# Patient Record
Sex: MALE | Race: WHITE | NOT HISPANIC OR LATINO
[De-identification: names, ages, dates, MRNs, and addresses within clinical notes are randomized per-mention and may not be internally consistent; named-entity substitution may affect disease eponyms.]

---

## 2018-02-02 PROBLEM — Z00.00 ENCOUNTER FOR PREVENTIVE HEALTH EXAMINATION: Status: ACTIVE | Noted: 2018-02-02

## 2018-03-06 ENCOUNTER — APPOINTMENT (OUTPATIENT)
Dept: ORTHOPEDIC SURGERY | Facility: CLINIC | Age: 83
End: 2018-03-06
Payer: MEDICAID

## 2018-03-06 VITALS — HEIGHT: 63 IN | SYSTOLIC BLOOD PRESSURE: 161 MMHG | HEART RATE: 107 BPM | DIASTOLIC BLOOD PRESSURE: 85 MMHG

## 2018-03-06 PROCEDURE — 99204 OFFICE O/P NEW MOD 45 MIN: CPT

## 2018-03-06 PROCEDURE — 72100 X-RAY EXAM L-S SPINE 2/3 VWS: CPT

## 2018-03-09 ENCOUNTER — FORM ENCOUNTER (OUTPATIENT)
Age: 83
End: 2018-03-09

## 2018-03-10 ENCOUNTER — OUTPATIENT (OUTPATIENT)
Dept: OUTPATIENT SERVICES | Facility: HOSPITAL | Age: 83
LOS: 1 days | End: 2018-03-10
Payer: MEDICAID

## 2018-03-10 ENCOUNTER — APPOINTMENT (OUTPATIENT)
Dept: MRI IMAGING | Facility: CLINIC | Age: 83
End: 2018-03-10
Payer: MEDICAID

## 2018-03-10 DIAGNOSIS — G95.9 DISEASE OF SPINAL CORD, UNSPECIFIED: ICD-10-CM

## 2018-03-10 DIAGNOSIS — M47.816 SPONDYLOSIS WITHOUT MYELOPATHY OR RADICULOPATHY, LUMBAR REGION: ICD-10-CM

## 2018-03-10 PROCEDURE — 72141 MRI NECK SPINE W/O DYE: CPT | Mod: 26

## 2018-03-10 PROCEDURE — 72148 MRI LUMBAR SPINE W/O DYE: CPT | Mod: 26

## 2018-03-10 PROCEDURE — 72141 MRI NECK SPINE W/O DYE: CPT

## 2018-03-10 PROCEDURE — 72148 MRI LUMBAR SPINE W/O DYE: CPT

## 2018-04-11 ENCOUNTER — RECORD ABSTRACTING (OUTPATIENT)
Age: 83
End: 2018-04-11

## 2018-04-13 ENCOUNTER — APPOINTMENT (OUTPATIENT)
Dept: ORTHOPEDIC SURGERY | Facility: CLINIC | Age: 83
End: 2018-04-13
Payer: MEDICAID

## 2018-04-13 VITALS
SYSTOLIC BLOOD PRESSURE: 166 MMHG | HEIGHT: 63 IN | HEART RATE: 107 BPM | DIASTOLIC BLOOD PRESSURE: 85 MMHG | WEIGHT: 155 LBS | BODY MASS INDEX: 27.46 KG/M2

## 2018-04-13 DIAGNOSIS — Z86.69 PERSONAL HISTORY OF OTHER DISEASES OF THE NERVOUS SYSTEM AND SENSE ORGANS: ICD-10-CM

## 2018-04-13 DIAGNOSIS — Z87.39 PERSONAL HISTORY OF OTHER DISEASES OF THE MUSCULOSKELETAL SYSTEM AND CONNECTIVE TISSUE: ICD-10-CM

## 2018-04-13 DIAGNOSIS — Z86.39 PERSONAL HISTORY OF OTHER ENDOCRINE, NUTRITIONAL AND METABOLIC DISEASE: ICD-10-CM

## 2018-04-13 PROCEDURE — 72040 X-RAY EXAM NECK SPINE 2-3 VW: CPT

## 2018-04-13 PROCEDURE — 99213 OFFICE O/P EST LOW 20 MIN: CPT

## 2018-04-13 PROCEDURE — 99215 OFFICE O/P EST HI 40 MIN: CPT

## 2018-06-13 ENCOUNTER — OUTPATIENT (OUTPATIENT)
Dept: OUTPATIENT SERVICES | Facility: HOSPITAL | Age: 83
LOS: 1 days | End: 2018-06-13
Payer: MEDICAID

## 2018-06-13 DIAGNOSIS — M54.2 CERVICALGIA: ICD-10-CM

## 2018-06-13 DIAGNOSIS — Z51.89 ENCOUNTER FOR OTHER SPECIFIED AFTERCARE: ICD-10-CM

## 2018-06-13 DIAGNOSIS — M54.5 LOW BACK PAIN: ICD-10-CM

## 2018-06-15 ENCOUNTER — APPOINTMENT (OUTPATIENT)
Dept: NEUROLOGY | Facility: CLINIC | Age: 83
End: 2018-06-15
Payer: MEDICAID

## 2018-06-15 VITALS
WEIGHT: 155 LBS | BODY MASS INDEX: 27.46 KG/M2 | HEIGHT: 63 IN | DIASTOLIC BLOOD PRESSURE: 65 MMHG | SYSTOLIC BLOOD PRESSURE: 150 MMHG

## 2018-06-15 PROCEDURE — 99204 OFFICE O/P NEW MOD 45 MIN: CPT

## 2018-06-27 ENCOUNTER — APPOINTMENT (OUTPATIENT)
Dept: NEUROLOGY | Facility: CLINIC | Age: 83
End: 2018-06-27
Payer: MEDICAID

## 2018-06-27 PROCEDURE — 93880 EXTRACRANIAL BILAT STUDY: CPT

## 2018-07-20 ENCOUNTER — APPOINTMENT (OUTPATIENT)
Dept: UROLOGY | Facility: CLINIC | Age: 83
End: 2018-07-20
Payer: MEDICAID

## 2018-07-20 VITALS
HEART RATE: 96 BPM | WEIGHT: 155 LBS | SYSTOLIC BLOOD PRESSURE: 152 MMHG | TEMPERATURE: 98.6 F | BODY MASS INDEX: 27.46 KG/M2 | DIASTOLIC BLOOD PRESSURE: 67 MMHG | HEIGHT: 63 IN

## 2018-07-20 DIAGNOSIS — Z78.9 OTHER SPECIFIED HEALTH STATUS: ICD-10-CM

## 2018-07-20 DIAGNOSIS — Z82.61 FAMILY HISTORY OF ARTHRITIS: ICD-10-CM

## 2018-07-20 PROCEDURE — 99204 OFFICE O/P NEW MOD 45 MIN: CPT

## 2018-08-03 ENCOUNTER — TRANSCRIPTION ENCOUNTER (OUTPATIENT)
Age: 83
End: 2018-08-03

## 2018-08-07 ENCOUNTER — MESSAGE (OUTPATIENT)
Age: 83
End: 2018-08-07

## 2018-08-08 PROCEDURE — 97110 THERAPEUTIC EXERCISES: CPT

## 2018-08-08 PROCEDURE — 97162 PT EVAL MOD COMPLEX 30 MIN: CPT

## 2018-08-08 PROCEDURE — 97140 MANUAL THERAPY 1/> REGIONS: CPT

## 2018-08-08 PROCEDURE — 97010 HOT OR COLD PACKS THERAPY: CPT

## 2018-08-10 ENCOUNTER — APPOINTMENT (OUTPATIENT)
Dept: UROLOGY | Facility: CLINIC | Age: 83
End: 2018-08-10
Payer: MEDICAID

## 2018-08-10 ENCOUNTER — LABORATORY RESULT (OUTPATIENT)
Age: 83
End: 2018-08-10

## 2018-08-10 VITALS
TEMPERATURE: 98.6 F | SYSTOLIC BLOOD PRESSURE: 145 MMHG | WEIGHT: 155 LBS | DIASTOLIC BLOOD PRESSURE: 68 MMHG | HEIGHT: 63 IN | BODY MASS INDEX: 27.46 KG/M2 | HEART RATE: 87 BPM

## 2018-08-10 PROCEDURE — 76942 ECHO GUIDE FOR BIOPSY: CPT | Mod: 59

## 2018-08-10 PROCEDURE — 55700: CPT

## 2018-08-10 PROCEDURE — 76872 US TRANSRECTAL: CPT

## 2018-08-10 RX ORDER — CIPROFLOXACIN HYDROCHLORIDE 500 MG/1
500 TABLET, FILM COATED ORAL
Qty: 2 | Refills: 0 | Status: DISCONTINUED | COMMUNITY
Start: 2018-07-26 | End: 2018-08-10

## 2018-08-14 ENCOUNTER — MESSAGE (OUTPATIENT)
Age: 83
End: 2018-08-14

## 2018-08-17 ENCOUNTER — APPOINTMENT (OUTPATIENT)
Dept: UROLOGY | Facility: CLINIC | Age: 83
End: 2018-08-17
Payer: MEDICAID

## 2018-08-17 VITALS
WEIGHT: 155 LBS | HEART RATE: 84 BPM | SYSTOLIC BLOOD PRESSURE: 121 MMHG | BODY MASS INDEX: 27.46 KG/M2 | DIASTOLIC BLOOD PRESSURE: 61 MMHG | TEMPERATURE: 98.6 F | HEIGHT: 63 IN

## 2018-08-17 PROCEDURE — 99214 OFFICE O/P EST MOD 30 MIN: CPT

## 2018-08-29 ENCOUNTER — FORM ENCOUNTER (OUTPATIENT)
Age: 83
End: 2018-08-29

## 2018-08-30 ENCOUNTER — APPOINTMENT (OUTPATIENT)
Dept: CT IMAGING | Facility: IMAGING CENTER | Age: 83
End: 2018-08-30
Payer: MEDICAID

## 2018-08-30 ENCOUNTER — OUTPATIENT (OUTPATIENT)
Dept: OUTPATIENT SERVICES | Facility: HOSPITAL | Age: 83
LOS: 1 days | End: 2018-08-30
Payer: MEDICAID

## 2018-08-30 ENCOUNTER — APPOINTMENT (OUTPATIENT)
Dept: NUCLEAR MEDICINE | Facility: IMAGING CENTER | Age: 83
End: 2018-08-30
Payer: MEDICAID

## 2018-08-30 DIAGNOSIS — R97.20 ELEVATED PROSTATE SPECIFIC ANTIGEN [PSA]: ICD-10-CM

## 2018-08-30 PROCEDURE — 78999 UNLISTED MISC PX DX NUC MED: CPT

## 2018-08-30 PROCEDURE — 74177 CT ABD & PELVIS W/CONTRAST: CPT

## 2018-08-30 PROCEDURE — 74177 CT ABD & PELVIS W/CONTRAST: CPT | Mod: 26

## 2018-08-30 PROCEDURE — 78306 BONE IMAGING WHOLE BODY: CPT | Mod: 26

## 2018-08-30 PROCEDURE — 78320: CPT | Mod: 26

## 2018-08-30 PROCEDURE — A9561: CPT

## 2018-08-30 PROCEDURE — 78306 BONE IMAGING WHOLE BODY: CPT

## 2018-08-30 PROCEDURE — 82565 ASSAY OF CREATININE: CPT

## 2018-08-31 ENCOUNTER — APPOINTMENT (OUTPATIENT)
Dept: UROLOGY | Facility: CLINIC | Age: 83
End: 2018-08-31
Payer: MEDICAID

## 2018-08-31 VITALS
BODY MASS INDEX: 27.46 KG/M2 | TEMPERATURE: 97.9 F | WEIGHT: 155 LBS | DIASTOLIC BLOOD PRESSURE: 68 MMHG | HEART RATE: 80 BPM | SYSTOLIC BLOOD PRESSURE: 138 MMHG | HEIGHT: 63 IN

## 2018-08-31 PROCEDURE — 96402 CHEMO HORMON ANTINEOPL SQ/IM: CPT

## 2018-08-31 PROCEDURE — 99214 OFFICE O/P EST MOD 30 MIN: CPT | Mod: 25

## 2018-09-07 ENCOUNTER — OTHER (OUTPATIENT)
Age: 83
End: 2018-09-07

## 2018-11-13 ENCOUNTER — MESSAGE (OUTPATIENT)
Age: 83
End: 2018-11-13

## 2018-11-16 ENCOUNTER — APPOINTMENT (OUTPATIENT)
Dept: UROLOGY | Facility: CLINIC | Age: 83
End: 2018-11-16
Payer: MEDICAID

## 2018-11-16 VITALS
HEIGHT: 63 IN | BODY MASS INDEX: 27.46 KG/M2 | SYSTOLIC BLOOD PRESSURE: 144 MMHG | DIASTOLIC BLOOD PRESSURE: 83 MMHG | HEART RATE: 83 BPM | WEIGHT: 155 LBS

## 2018-11-16 PROCEDURE — 99214 OFFICE O/P EST MOD 30 MIN: CPT

## 2018-11-16 RX ORDER — ATORVASTATIN CALCIUM 20 MG/1
20 TABLET, FILM COATED ORAL
Refills: 0 | Status: DISCONTINUED | COMMUNITY
End: 2018-11-16

## 2019-01-04 ENCOUNTER — APPOINTMENT (OUTPATIENT)
Dept: UROLOGY | Facility: CLINIC | Age: 84
End: 2019-01-04

## 2019-01-11 ENCOUNTER — APPOINTMENT (OUTPATIENT)
Dept: UROLOGY | Facility: CLINIC | Age: 84
End: 2019-01-11
Payer: MEDICAID

## 2019-01-11 VITALS — RESPIRATION RATE: 16 BRPM | HEART RATE: 80 BPM | DIASTOLIC BLOOD PRESSURE: 88 MMHG | SYSTOLIC BLOOD PRESSURE: 154 MMHG

## 2019-01-11 PROCEDURE — 96402 CHEMO HORMON ANTINEOPL SQ/IM: CPT

## 2019-01-11 PROCEDURE — 99214 OFFICE O/P EST MOD 30 MIN: CPT | Mod: 25

## 2019-01-11 RX ORDER — LEUPROLIDE ACETATE 30 MG
30 KIT INTRAMUSCULAR
Qty: 1 | Refills: 0 | Status: COMPLETED | OUTPATIENT
Start: 2019-01-11

## 2019-01-11 RX ADMIN — LEUPROLIDE ACETATE 0 MG: KIT at 00:00

## 2019-01-11 NOTE — PHYSICAL EXAM
[General Appearance - Well Developed] : well developed [General Appearance - Well Nourished] : well nourished [Normal Appearance] : normal appearance [Well Groomed] : well groomed [General Appearance - In No Acute Distress] : no acute distress [Abdomen Soft] : soft [Abdomen Tenderness] : non-tender [Costovertebral Angle Tenderness] : no ~M costovertebral angle tenderness [Urethral Meatus] : meatus normal [Urinary Bladder Findings] : the bladder was normal on palpation [Scrotum] : the scrotum was normal [Testes Tenderness] : no tenderness of the testes [Testes Mass (___cm)] : there were no testicular masses [] : no respiratory distress [Oriented To Time, Place, And Person] : oriented to person, place, and time [Affect] : the affect was normal [Mood] : the mood was normal [Not Anxious] : not anxious [FreeTextEntry1] : Walking with assistance

## 2019-01-11 NOTE — HISTORY OF PRESENT ILLNESS
[FreeTextEntry1] : He is an 83-year-old man who is seen today with his family for prostate cancer, urinary retention and hematuria. He is receiving Lupron injection today. He is on self-catheterization once a day for retention and sometimes has noticed blood in the urine. He also voids spontaneously. He is on anticoagulation therapy. He also went to [facility] for a second opinion which agreed with our management of prostate cancer with Lupron and Casodex.\par Previous notes: In November 2018, he was admitted to [facility] for gross hematuria. He was placed on continuous bladder irrigation and received blood transfusion. \par PSA was 98. Prostate biopsy showed high-volume Mikael 8 prostate cancer. He is on Flomax twice a day. Bone scan was normal. 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 He has been noticing weight loss. In June 2018, urinalysis was negative and PSA level was 98.7. He uses a cane to walk, he had a stroke about 10 years ago and he is on Coumadin. Lumbar MRI in the past had shown thickened bladder wall and renal cysts.

## 2019-01-11 NOTE — LETTER BODY
[Dear  ___] : Dear  [unfilled], [Consult Letter:] : I had the pleasure of evaluating your patient, [unfilled]. [Consult Closing:] : Thank you very much for allowing me to participate in the care of this patient.  If you have any questions, please do not hesitate to contact me. [FreeTextEntry1] : \par \par Address: [address] [place] [address]\par \par \par \par Phone: [phone]

## 2019-01-11 NOTE — ASSESSMENT
[FreeTextEntry1] : He will continue with self catheterizations daily. Residual urine today was unchanged, around 400 ml, since he had catheterized last night. He will continue with tamsulosin and Casodex. Lupron 30 mg injection was given on the left side, but number [id number], expiration date September 26, 2020. He should also continue with vitamin D and calcium. He will have a PSA level done and followup in 4 months. Also he should consider cystoscopy for workup of gross hematuria.\par \par Alireza Vinson MD, FACS\par [facility]\par  of Urology\par \par [address], [address]\[place] [address]\par \par [address], [address]\[place] [address]\par \par Tel: [phone]\par Fax: [phone]

## 2019-03-08 ENCOUNTER — OTHER (OUTPATIENT)
Age: 84
End: 2019-03-08

## 2019-03-08 ENCOUNTER — APPOINTMENT (OUTPATIENT)
Dept: ORTHOPEDIC SURGERY | Facility: CLINIC | Age: 84
End: 2019-03-08
Payer: MEDICAID

## 2019-03-08 VITALS
WEIGHT: 155 LBS | HEIGHT: 63 IN | DIASTOLIC BLOOD PRESSURE: 68 MMHG | HEART RATE: 78 BPM | SYSTOLIC BLOOD PRESSURE: 140 MMHG | BODY MASS INDEX: 27.46 KG/M2

## 2019-03-08 DIAGNOSIS — Z86.39 PERSONAL HISTORY OF OTHER ENDOCRINE, NUTRITIONAL AND METABOLIC DISEASE: ICD-10-CM

## 2019-03-08 DIAGNOSIS — Z86.73 PERSONAL HISTORY OF TRANSIENT ISCHEMIC ATTACK (TIA), AND CEREBRAL INFARCTION W/OUT RESIDUAL DEFICITS: ICD-10-CM

## 2019-03-08 DIAGNOSIS — G95.9 DISEASE OF SPINAL CORD, UNSPECIFIED: ICD-10-CM

## 2019-03-08 DIAGNOSIS — M65.30 TRIGGER FINGER, UNSPECIFIED FINGER: ICD-10-CM

## 2019-03-08 DIAGNOSIS — Z86.79 PERSONAL HISTORY OF OTHER DISEASES OF THE CIRCULATORY SYSTEM: ICD-10-CM

## 2019-03-08 PROCEDURE — 99214 OFFICE O/P EST MOD 30 MIN: CPT

## 2019-03-08 NOTE — HISTORY OF PRESENT ILLNESS
[de-identified] : 84 year old M presents c/o left hand trigger finger on the 3rd finger. He was seen last year for neck pain, was found to have cervical stenosis. He is a retired physician. Recently dx with prostate CA, treatment going well. He does not have any radicular pain of arms or legs. He states he is not falling, penmanship is normal. He ambulates with a cane due to general dibility.  [Ataxia] : no ataxia [Incontinence] : no incontinence [Loss of Dexterity] : good dexterity [Urinary Ret.] : no urinary retention

## 2019-03-08 NOTE — DISCUSSION/SUMMARY
[de-identified] : Pt is referred to hand surgery for evaluation of left trigger finger. He was offered formal PT for neck pain, but he declines stating he does not have time, and for this reason he was given written home exercises. Intermittent use of Tylenol or anti-inflammatories as needed. He is to get a cervical MRI due to hx of cervical stenosis. Pt will follow up in 4-6 after MRI is completely. If pain persists, consider PT.

## 2019-03-08 NOTE — PHYSICAL EXAM
[Poor Appearance] : well-appearing [Acute Distress] : not in acute distress [Obese] : not obese [de-identified] : CONSTITUTIONAL: Patient is a very pleasant individual who is well-nourished and appears stated age. ambulates with a cane, accompanied by his wife.\par PSYCHIATRIC: Alert and oriented times three and in no apparent distress, and participates with orthopedic evaluation well.\par HEAD: Atraumatic and nonsyndromic in appearance.\par EENT: No thyromegaly, EOMI.\par RESPIRATORY: Respiratory rate is regular, not dyspneic on examination.\par LYMPHATICS: There is no cervical or axillary lymphadenopathy.\par INTEGUMENTARY: Skin is clean, dry, and intact about the bilateral upper extremities and cervical spine. \par VASCULAR: There is brisk capillary refill about the bilateral upper extremities and radial pulses are 2/4. \par NEUROLOGIC: Negative L'hirmitte, negative Spurling’s sign. There are no pathologic reflexes. There is no decrease in sensation of the bilateral upper extremities on Wartenberg pinwheel examination. Deep tendon reflexes are well-maintained at +2/4 of the bilateral upper extremities and are symmetric.\par MUSCULOSKELETAL: Manual motor strength is well maintained in the bilateral upper extremities. The patient ambulates in a non-myelopathic manner. Normal secondary orthopaedic exam of bilateral shoulders, elbows and hands. Elbow flexion and extension, wrist extension, finger flexion and abduction are well maintained. \par \par trigger finger left third digit. some atrophy b/l thenar eminence. pain on cervical extension in an uncovertebral pattern which is mild in nature

## 2019-03-11 ENCOUNTER — TRANSCRIPTION ENCOUNTER (OUTPATIENT)
Age: 84
End: 2019-03-11

## 2019-03-17 LAB — PSA SERPL-MCNC: 0.26 NG/ML

## 2019-03-22 ENCOUNTER — OUTPATIENT (OUTPATIENT)
Dept: OUTPATIENT SERVICES | Facility: HOSPITAL | Age: 84
LOS: 1 days | End: 2019-03-22
Payer: MEDICAID

## 2019-03-22 ENCOUNTER — APPOINTMENT (OUTPATIENT)
Dept: MRI IMAGING | Facility: CLINIC | Age: 84
End: 2019-03-22
Payer: MEDICAID

## 2019-03-22 DIAGNOSIS — M48.02 SPINAL STENOSIS, CERVICAL REGION: ICD-10-CM

## 2019-03-22 DIAGNOSIS — M47.812 SPONDYLOSIS WITHOUT MYELOPATHY OR RADICULOPATHY, CERVICAL REGION: ICD-10-CM

## 2019-03-22 DIAGNOSIS — Z00.8 ENCOUNTER FOR OTHER GENERAL EXAMINATION: ICD-10-CM

## 2019-03-22 PROCEDURE — 72141 MRI NECK SPINE W/O DYE: CPT

## 2019-03-22 PROCEDURE — 72141 MRI NECK SPINE W/O DYE: CPT | Mod: 26

## 2019-03-25 ENCOUNTER — MESSAGE (OUTPATIENT)
Age: 84
End: 2019-03-25

## 2019-03-26 ENCOUNTER — APPOINTMENT (OUTPATIENT)
Dept: ORTHOPEDIC SURGERY | Facility: CLINIC | Age: 84
End: 2019-03-26
Payer: MEDICAID

## 2019-03-26 VITALS
SYSTOLIC BLOOD PRESSURE: 137 MMHG | BODY MASS INDEX: 27.46 KG/M2 | WEIGHT: 155 LBS | HEART RATE: 76 BPM | DIASTOLIC BLOOD PRESSURE: 73 MMHG | HEIGHT: 63 IN

## 2019-03-26 PROCEDURE — 20550 NJX 1 TENDON SHEATH/LIGAMENT: CPT | Mod: FA

## 2019-03-26 PROCEDURE — 99214 OFFICE O/P EST MOD 30 MIN: CPT | Mod: 25

## 2019-04-12 ENCOUNTER — APPOINTMENT (OUTPATIENT)
Dept: UROLOGY | Facility: CLINIC | Age: 84
End: 2019-04-12
Payer: MEDICAID

## 2019-04-12 VITALS — RESPIRATION RATE: 16 BRPM | HEART RATE: 84 BPM | DIASTOLIC BLOOD PRESSURE: 70 MMHG | SYSTOLIC BLOOD PRESSURE: 125 MMHG

## 2019-04-12 PROCEDURE — 99213 OFFICE O/P EST LOW 20 MIN: CPT

## 2019-04-12 NOTE — LETTER BODY
[Dear  ___] : Dear  [unfilled], [Consult Letter:] : I had the pleasure of evaluating your patient, [unfilled]. [Consult Closing:] : Thank you very much for allowing me to participate in the care of this patient.  If you have any questions, please do not hesitate to contact me. [FreeTextEntry1] : \par \par Address: [address] [place] [address]\par \par \par \par Phone: [phone]

## 2019-04-12 NOTE — PHYSICAL EXAM
[General Appearance - Well Developed] : well developed [General Appearance - Well Nourished] : well nourished [Normal Appearance] : normal appearance [General Appearance - In No Acute Distress] : no acute distress [Well Groomed] : well groomed [Abdomen Soft] : soft [Abdomen Tenderness] : non-tender [Costovertebral Angle Tenderness] : no ~M costovertebral angle tenderness [Urethral Meatus] : meatus normal [Scrotum] : the scrotum was normal [Urinary Bladder Findings] : the bladder was normal on palpation [Testes Tenderness] : no tenderness of the testes [Testes Mass (___cm)] : there were no testicular masses [FreeTextEntry1] :  BOB done in 2018, Silva with clear urine. [] : no respiratory distress [Respiration, Rhythm And Depth] : normal respiratory rhythm and effort [Exaggerated Use Of Accessory Muscles For Inspiration] : no accessory muscle use [Oriented To Time, Place, And Person] : oriented to person, place, and time [Affect] : the affect was normal [Mood] : the mood was normal [Not Anxious] : not anxious

## 2019-04-12 NOTE — ASSESSMENT
[FreeTextEntry1] : PSA decreased significantly as expected. Silva catheter was removed and he will return to the self-catheterization once or twice a day. He will followup in May for the next Lupron injection and also cystoscopy for recurrent gross hematuria.\par \par Alireza Vinson MD, FACS\par [facility]\par  of Urology\par \par [address], [address]\[place] [address]\par \par [address] [facility] [facility], [address]\[place] [address]\par \par Tel: [phone]\par Fax: [phone]

## 2019-04-12 NOTE — HISTORY OF PRESENT ILLNESS
[FreeTextEntry1] : He is an 84-year-old man who is seen today with his family for prostate cancer, urinary retention and hematuria. Recently, he was hospitalized again for gross hematuria and a catheter was placed. Urine is clear now. He is otherwise on self-catheterization. He was told that anticoagulation therapy must continue by cardiology. He is due for the next Lupron shot in May 2019. PSA was 0.26 in March 2019.\par Previous notes: He went to [facility] for a second opinion which agreed with our management of prostate cancer with Lupron and Casodex. In November 2018, he was admitted to [facility] for gross hematuria. He was placed on continuous bladder irrigation and received blood transfusion. \par PSA was 98. Prostate biopsy showed high-volume Mikael 8 prostate cancer. He is on Flomax twice a day. Bone scan was normal. 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 He has been noticing weight loss. In June 2018, urinalysis was negative and PSA level was 98.7. He uses a cane to walk, he had a stroke about 10 years ago and he is on Coumadin. Lumbar MRI in the past had shown thickened bladder wall and renal cysts.

## 2019-04-19 ENCOUNTER — APPOINTMENT (OUTPATIENT)
Dept: ORTHOPEDIC SURGERY | Facility: CLINIC | Age: 84
End: 2019-04-19

## 2019-05-10 ENCOUNTER — APPOINTMENT (OUTPATIENT)
Dept: UROLOGY | Facility: CLINIC | Age: 84
End: 2019-05-10
Payer: MEDICAID

## 2019-05-10 ENCOUNTER — APPOINTMENT (OUTPATIENT)
Dept: UROLOGY | Facility: CLINIC | Age: 84
End: 2019-05-10

## 2019-05-10 VITALS
WEIGHT: 155 LBS | BODY MASS INDEX: 27.46 KG/M2 | SYSTOLIC BLOOD PRESSURE: 121 MMHG | HEART RATE: 69 BPM | HEIGHT: 63 IN | DIASTOLIC BLOOD PRESSURE: 65 MMHG

## 2019-05-10 PROCEDURE — 52000 CYSTOURETHROSCOPY: CPT

## 2019-05-10 PROCEDURE — 96402 CHEMO HORMON ANTINEOPL SQ/IM: CPT

## 2019-05-13 LAB — URINE CYTOLOGY: NORMAL

## 2019-05-17 ENCOUNTER — APPOINTMENT (OUTPATIENT)
Dept: UROLOGY | Facility: CLINIC | Age: 84
End: 2019-05-17

## 2019-05-22 RX ORDER — LEUPROLIDE ACETATE 30 MG
30 KIT INTRAMUSCULAR
Qty: 1 | Refills: 0 | Status: COMPLETED | OUTPATIENT
Start: 2019-05-22

## 2019-05-22 RX ADMIN — LEUPROLIDE ACETATE 0 MG: KIT at 00:00

## 2019-07-31 ENCOUNTER — RX RENEWAL (OUTPATIENT)
Age: 84
End: 2019-07-31

## 2019-08-28 ENCOUNTER — RX RENEWAL (OUTPATIENT)
Age: 84
End: 2019-08-28

## 2019-09-13 ENCOUNTER — APPOINTMENT (OUTPATIENT)
Dept: UROLOGY | Facility: CLINIC | Age: 84
End: 2019-09-13
Payer: MEDICAID

## 2019-09-13 VITALS — SYSTOLIC BLOOD PRESSURE: 132 MMHG | HEART RATE: 69 BPM | DIASTOLIC BLOOD PRESSURE: 69 MMHG

## 2019-09-13 DIAGNOSIS — R35.1 BENIGN PROSTATIC HYPERPLASIA WITH LOWER URINARY TRACT SYMPMS: ICD-10-CM

## 2019-09-13 DIAGNOSIS — N40.1 BENIGN PROSTATIC HYPERPLASIA WITH LOWER URINARY TRACT SYMPMS: ICD-10-CM

## 2019-09-13 PROCEDURE — 99214 OFFICE O/P EST MOD 30 MIN: CPT | Mod: 25

## 2019-09-13 PROCEDURE — 96402 CHEMO HORMON ANTINEOPL SQ/IM: CPT

## 2019-09-13 RX ORDER — LEUPROLIDE ACETATE 30 MG
30 KIT INTRAMUSCULAR
Qty: 1 | Refills: 0 | Status: COMPLETED | OUTPATIENT
Start: 2019-09-13

## 2019-09-13 RX ORDER — LEUPROLIDE ACETATE 30 MG
30 KIT INTRAMUSCULAR
Qty: 1 | Refills: 0 | Status: COMPLETED | COMMUNITY
Start: 2019-09-13 | End: 2019-09-13

## 2019-09-13 RX ADMIN — LEUPROLIDE ACETATE 0 MG: KIT at 00:00

## 2019-09-13 NOTE — PHYSICAL EXAM
[General Appearance - Well Developed] : well developed [General Appearance - Well Nourished] : well nourished [Well Groomed] : well groomed [Normal Appearance] : normal appearance [General Appearance - In No Acute Distress] : no acute distress [Abdomen Soft] : soft [Abdomen Tenderness] : non-tender [Costovertebral Angle Tenderness] : no ~M costovertebral angle tenderness [Urethral Meatus] : meatus normal [Urinary Bladder Findings] : the bladder was normal on palpation [Testes Mass (___cm)] : there were no testicular masses [Testes Tenderness] : no tenderness of the testes [Scrotum] : the scrotum was normal [] : no respiratory distress [Respiration, Rhythm And Depth] : normal respiratory rhythm and effort [Exaggerated Use Of Accessory Muscles For Inspiration] : no accessory muscle use [Oriented To Time, Place, And Person] : oriented to person, place, and time [Mood] : the mood was normal [Affect] : the affect was normal [Not Anxious] : not anxious [FreeTextEntry1] : using a cane to walk.

## 2019-09-13 NOTE — LETTER BODY
[Dear  ___] : Dear  [unfilled], [Consult Letter:] : I had the pleasure of evaluating your patient, [unfilled]. [Consult Closing:] : Thank you very much for allowing me to participate in the care of this patient.  If you have any questions, please do not hesitate to contact me. [FreeTextEntry1] : \par \par Address: [address] [place] [address]\par \par \par \par Phone: [phone]

## 2019-09-13 NOTE — ASSESSMENT
[FreeTextEntry1] : Continue Casodex. Lupron 30 mg injection was given on the left side, lot number [id number], expiration date September 26, 2021. Once again, side effects were discussed. Vitamin D and calcium was recommended. Samples of 16 Pashto coudé catheter were given to see if he has easier time getting the catheter in each time. For now, continue Tamsulosin. If he is unable to void spontaneously at all in the future, will stop it. Due for PSA. Follow up in 4 months. \par \par Alireza Vinson MD, FACS\par [facility]\par  of Urology\par \par [address], [address]\[place] [address]\par \par [address] [facility], [address]\[place] [address]\par \par Tel: [phone]\par Fax: [phone]

## 2019-09-13 NOTE — HISTORY OF PRESENT ILLNESS
[FreeTextEntry1] : He is an 84-year-old man who is seen today with his family for prostate cancer, urinary retention and hematuria. Cystoscopy in May 2019 showed a large prostate and trabeculated bladder. Urine cytology was negative. PSA level decreased to 0.26 in March 2019. He was hospitalized for gross hematuria previously. For chronic urinary retention, he self catheterizes but he is not able to get the catheter in each time. He seems to have overflow incontinence when he is unable to catheterize. He is on anticoagulation therapy. He is on Lupron and Casodex, Flomax twice a day.\par Previous notes: He went to [facility] for a second opinion which agreed with our management of prostate cancer with Lupron and Casodex. In November 2018, he was admitted to [facility] for gross hematuria. He was placed on continuous bladder irrigation and received blood transfusion. \par PSA was 98. Prostate biopsy showed high-volume Mikael 8 prostate cancer. Bone scan was normal. 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 He has been noticing weight loss. In June 2018, urinalysis was negative and PSA level was 98.7. He uses a cane to walk, he had a stroke about 10 years ago and he is on Coumadin. Lumbar MRI in the past had shown thickened bladder wall and renal cysts.

## 2019-10-22 ENCOUNTER — APPOINTMENT (OUTPATIENT)
Dept: ORTHOPEDIC SURGERY | Facility: CLINIC | Age: 84
End: 2019-10-22
Payer: MEDICAID

## 2019-10-22 VITALS
HEART RATE: 71 BPM | DIASTOLIC BLOOD PRESSURE: 81 MMHG | SYSTOLIC BLOOD PRESSURE: 149 MMHG | WEIGHT: 155 LBS | BODY MASS INDEX: 27.46 KG/M2 | HEIGHT: 63 IN

## 2019-10-22 DIAGNOSIS — M65.312 TRIGGER THUMB, LEFT THUMB: ICD-10-CM

## 2019-10-22 DIAGNOSIS — M65.342 TRIGGER FINGER, LEFT RING FINGER: ICD-10-CM

## 2019-10-22 PROCEDURE — 99213 OFFICE O/P EST LOW 20 MIN: CPT | Mod: 25

## 2019-10-22 PROCEDURE — 20550 NJX 1 TENDON SHEATH/LIGAMENT: CPT | Mod: FA

## 2019-11-16 ENCOUNTER — MESSAGE (OUTPATIENT)
Age: 84
End: 2019-11-16

## 2019-11-18 ENCOUNTER — MOBILE ON CALL (OUTPATIENT)
Age: 84
End: 2019-11-18

## 2019-11-22 ENCOUNTER — APPOINTMENT (OUTPATIENT)
Dept: UROLOGY | Facility: CLINIC | Age: 84
End: 2019-11-22
Payer: MEDICAID

## 2019-11-22 VITALS — SYSTOLIC BLOOD PRESSURE: 147 MMHG | DIASTOLIC BLOOD PRESSURE: 86 MMHG | HEART RATE: 99 BPM

## 2019-11-22 DIAGNOSIS — Z87.898 PERSONAL HISTORY OF OTHER SPECIFIED CONDITIONS: ICD-10-CM

## 2019-11-22 PROCEDURE — 99213 OFFICE O/P EST LOW 20 MIN: CPT

## 2019-11-22 NOTE — HISTORY OF PRESENT ILLNESS
[FreeTextEntry1] : He is an 84-year-old man who is seen today with his family for prostate cancer, urinary retention and hematuria. Recently, had difficulty catheterizing and developed urinary tract infection and hematuria. He required hospitalization and IV antibiotics for a few days at [facility]. He has a catheter now and feels too weak to start self catheterizations again. He is due for next Lupron injection in January 2019. Cystoscopy in May 2019 showed enlarged prostate and trabeculated bladder. Also, urine cytology was benign. PSA level was 0.26 in March 2019.  He is on anticoagulation therapy. He is on Lupron and Casodex, Flomax twice a day.\par Previous notes: He went to [facility] for a second opinion which agreed with our management of prostate cancer with Lupron and Casodex. In November 2018, he was admitted to [facility] for gross hematuria. He was placed on continuous bladder irrigation and received blood transfusion. \par PSA was 98. Prostate biopsy showed high-volume Mikael 8 prostate cancer. Bone scan was normal. 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 He has been noticing weight loss. In June 2018, urinalysis was negative and PSA level was 98.7. He uses a cane to walk, he had a stroke about 10 years ago and he is on Coumadin. Lumbar MRI in the past had shown thickened bladder wall and renal cysts.

## 2019-11-22 NOTE — LETTER BODY
[Dear  ___] : Dear  [unfilled], [Consult Letter:] : I had the pleasure of evaluating your patient, [unfilled]. [Consult Closing:] : Thank you very much for allowing me to participate in the care of this patient.  If you have any questions, please do not hesitate to contact me. [FreeTextEntry1] : \par \par Address: [address] [place] [address]\par \par \par \par Phone: [phone]

## 2019-11-22 NOTE — PHYSICAL EXAM
[General Appearance - Well Developed] : well developed [General Appearance - Well Nourished] : well nourished [Normal Appearance] : normal appearance [Well Groomed] : well groomed [General Appearance - In No Acute Distress] : no acute distress [Abdomen Soft] : soft [Abdomen Tenderness] : non-tender [Costovertebral Angle Tenderness] : no ~M costovertebral angle tenderness [Urethral Meatus] : meatus normal [Urinary Bladder Findings] : the bladder was normal on palpation [Scrotum] : the scrotum was normal [Testes Tenderness] : no tenderness of the testes [Testes Mass (___cm)] : there were no testicular masses [FreeTextEntry1] :  BOB done in 2018. Silva with clear urine. [] : no respiratory distress [Respiration, Rhythm And Depth] : normal respiratory rhythm and effort [Exaggerated Use Of Accessory Muscles For Inspiration] : no accessory muscle use

## 2019-11-22 NOTE — ASSESSMENT
[FreeTextEntry1] : Urine is clear. From my standpoint, he should restart anticoagulation therapy. He will followup in one month for catheter change or removal. PSA level decreased as expected. Also he might benefit from seeing medical oncology. Vitamin D and calcium has been recommended.\par \par Alireza Vinson MD, FACS\par [facility]\par  of Urology\par \par [address], [address]\[place] [address]\par \par [address] [facility] [facility]\[place] [address]\par \par Tel: [phone]\par Fax: [phone]

## 2019-12-16 ENCOUNTER — OUTPATIENT (OUTPATIENT)
Dept: OUTPATIENT SERVICES | Facility: HOSPITAL | Age: 84
LOS: 1 days | Discharge: HOME | End: 2019-12-16

## 2019-12-16 DIAGNOSIS — C61 MALIGNANT NEOPLASM OF PROSTATE: ICD-10-CM

## 2019-12-18 ENCOUNTER — APPOINTMENT (OUTPATIENT)
Dept: HEMATOLOGY ONCOLOGY | Facility: CLINIC | Age: 84
End: 2019-12-18
Payer: MEDICAID

## 2019-12-18 VITALS
WEIGHT: 132.28 LBS | OXYGEN SATURATION: 98 % | HEART RATE: 80 BPM | BODY MASS INDEX: 24.03 KG/M2 | DIASTOLIC BLOOD PRESSURE: 73 MMHG | RESPIRATION RATE: 17 BRPM | TEMPERATURE: 97.5 F | SYSTOLIC BLOOD PRESSURE: 151 MMHG | HEIGHT: 62.05 IN

## 2019-12-18 DIAGNOSIS — Z80.8 FAMILY HISTORY OF MALIGNANT NEOPLASM OF OTHER ORGANS OR SYSTEMS: ICD-10-CM

## 2019-12-18 DIAGNOSIS — Z87.891 PERSONAL HISTORY OF NICOTINE DEPENDENCE: ICD-10-CM

## 2019-12-18 PROCEDURE — 99205 OFFICE O/P NEW HI 60 MIN: CPT

## 2019-12-18 RX ORDER — WARFARIN 2 MG/1
2 TABLET ORAL
Refills: 0 | Status: DISCONTINUED | COMMUNITY
End: 2019-12-18

## 2019-12-18 RX ORDER — ASPIRIN 81 MG
81 TABLET,CHEWABLE ORAL
Refills: 0 | Status: DISCONTINUED | COMMUNITY
End: 2019-12-18

## 2019-12-18 RX ORDER — RIVAROXABAN 20 MG/1
20 TABLET, FILM COATED ORAL
Qty: 30 | Refills: 0 | Status: DISCONTINUED | COMMUNITY
Start: 2018-07-30 | End: 2019-12-18

## 2019-12-18 NOTE — HISTORY OF PRESENT ILLNESS
[Disease: _____________________] : Disease: [unfilled] [AJCC Stage: ____] : AJCC Stage: [unfilled] [N: ___] : N[unfilled] [de-identified] : Adenocarcinoma [de-identified] : PSA [de-identified] : 8/2018-Patient was admitted to [facility] with gross hematuria, requiring packed red blood cell transfusion. PSA was 98.7, and prostate biopsy showed high volume [place] 8 prostate cancer. Bone scan normal. CAT scan showed iliac chain lymphadenopathy up to 2 cm. He was begun on androgen deprivation therapy with Lupron and Casodex. Patient obtained a second opinion at [facility] [place] and same treatment was recommended.\par Urologist administers Lupron injections,and patient has been taking Casodex.\par S/P Hospitalization [facility] 11/2019 for hematuria-had bladder irrigation. Has held his Eliquis due to hematuria, which has helped with the bleeding. Also treated for UTI. Patient has an indwelling morrison catheter.\par Patient requesting medical oncology opinion. He is generally feeling well currently. No fevers. His activity level is primarily limited by the neurologic sequela from his prior CVA.\par Daughter Eleni and wife present.\par  [de-identified] : [place] score 4+4 = 8 (right mid prostate biopsy, as well as right apex prostate biopsy with perineural invasion seen, and left apex biopsy). Prostate cancer also noted in left base prostate biopsy (Mikael score 3+3=6), left mid prostate biopsy (Mikael score 3+3=6).

## 2019-12-18 NOTE — CONSULT LETTER
[Dear  ___] : Dear  [unfilled], [Courtesy Letter:] : I had the pleasure of seeing your patient, [unfilled], in my office today. [Please see my note below.] : Please see my note below. [Consult Closing:] : Thank you very much for allowing me to participate in the care of this patient.  If you have any questions, please do not hesitate to contact me. [Sincerely,] : Sincerely, [DrFlorencio  ___] : Dr. FLOOD [FreeTextEntry3] : Karen Hemphill M.D.

## 2019-12-18 NOTE — REASON FOR VISIT
[Initial Consultation] : an initial consultation [Friend] : friend [Family Member] : family member [FreeTextEntry2] : prostate cancer

## 2019-12-18 NOTE — REVIEW OF SYSTEMS
[Negative] : Allergic/Immunologic [Fever] : no fever [Proptosis] : no proptosis [Swollen Glands] : no swollen glands [FreeTextEntry8] : has indwelling morrison catheter [de-identified] : right sided weakness post prior CVA

## 2019-12-27 ENCOUNTER — APPOINTMENT (OUTPATIENT)
Dept: UROLOGY | Facility: CLINIC | Age: 84
End: 2019-12-27
Payer: MEDICAID

## 2019-12-27 VITALS — SYSTOLIC BLOOD PRESSURE: 139 MMHG | DIASTOLIC BLOOD PRESSURE: 79 MMHG | HEART RATE: 84 BPM

## 2019-12-27 PROCEDURE — 51702 INSERT TEMP BLADDER CATH: CPT

## 2020-01-24 ENCOUNTER — APPOINTMENT (OUTPATIENT)
Dept: UROLOGY | Facility: CLINIC | Age: 85
End: 2020-01-24

## 2020-02-01 ENCOUNTER — OUTPATIENT (OUTPATIENT)
Dept: OUTPATIENT SERVICES | Facility: HOSPITAL | Age: 85
LOS: 1 days | End: 2020-02-01
Payer: MEDICAID

## 2020-02-01 PROCEDURE — G9001: CPT

## 2020-02-17 ENCOUNTER — EMERGENCY (EMERGENCY)
Facility: HOSPITAL | Age: 85
LOS: 1 days | End: 2020-02-17
Attending: EMERGENCY MEDICINE
Payer: MEDICAID

## 2020-02-17 VITALS
HEART RATE: 81 BPM | OXYGEN SATURATION: 96 % | SYSTOLIC BLOOD PRESSURE: 119 MMHG | DIASTOLIC BLOOD PRESSURE: 67 MMHG | TEMPERATURE: 98 F | RESPIRATION RATE: 18 BRPM

## 2020-02-17 LAB
ALBUMIN SERPL ELPH-MCNC: 2.8 G/DL — LOW (ref 3.3–5.2)
ALP SERPL-CCNC: 99 U/L — SIGNIFICANT CHANGE UP (ref 40–120)
ALT FLD-CCNC: 7 U/L — SIGNIFICANT CHANGE UP
ANION GAP SERPL CALC-SCNC: 10 MMOL/L — SIGNIFICANT CHANGE UP (ref 5–17)
APPEARANCE UR: ABNORMAL
AST SERPL-CCNC: 19 U/L — SIGNIFICANT CHANGE UP
BASOPHILS # BLD AUTO: 0.01 K/UL — SIGNIFICANT CHANGE UP (ref 0–0.2)
BASOPHILS NFR BLD AUTO: 0.2 % — SIGNIFICANT CHANGE UP (ref 0–2)
BILIRUB SERPL-MCNC: 0.6 MG/DL — SIGNIFICANT CHANGE UP (ref 0.4–2)
BILIRUB UR-MCNC: ABNORMAL
BUN SERPL-MCNC: 14 MG/DL — SIGNIFICANT CHANGE UP (ref 8–20)
CALCIUM SERPL-MCNC: 8.6 MG/DL — SIGNIFICANT CHANGE UP (ref 8.6–10.2)
CHLORIDE SERPL-SCNC: 94 MMOL/L — LOW (ref 98–107)
CO2 SERPL-SCNC: 28 MMOL/L — SIGNIFICANT CHANGE UP (ref 22–29)
COD CRY URNS QL: ABNORMAL
COLOR SPEC: YELLOW — SIGNIFICANT CHANGE UP
CREAT SERPL-MCNC: 0.84 MG/DL — SIGNIFICANT CHANGE UP (ref 0.5–1.3)
DIFF PNL FLD: ABNORMAL
EOSINOPHIL # BLD AUTO: 0.09 K/UL — SIGNIFICANT CHANGE UP (ref 0–0.5)
EOSINOPHIL NFR BLD AUTO: 1.5 % — SIGNIFICANT CHANGE UP (ref 0–6)
GLUCOSE SERPL-MCNC: 113 MG/DL — HIGH (ref 70–99)
GLUCOSE UR QL: NEGATIVE MG/DL — SIGNIFICANT CHANGE UP
HCT VFR BLD CALC: 29 % — LOW (ref 39–50)
HGB BLD-MCNC: 9.2 G/DL — LOW (ref 13–17)
IMM GRANULOCYTES NFR BLD AUTO: 0.5 % — SIGNIFICANT CHANGE UP (ref 0–1.5)
KETONES UR-MCNC: NEGATIVE — SIGNIFICANT CHANGE UP
LEUKOCYTE ESTERASE UR-ACNC: ABNORMAL
LYMPHOCYTES # BLD AUTO: 0.94 K/UL — LOW (ref 1–3.3)
LYMPHOCYTES # BLD AUTO: 15.8 % — SIGNIFICANT CHANGE UP (ref 13–44)
MCHC RBC-ENTMCNC: 25.6 PG — LOW (ref 27–34)
MCHC RBC-ENTMCNC: 31.7 GM/DL — LOW (ref 32–36)
MCV RBC AUTO: 80.8 FL — SIGNIFICANT CHANGE UP (ref 80–100)
MONOCYTES # BLD AUTO: 0.62 K/UL — SIGNIFICANT CHANGE UP (ref 0–0.9)
MONOCYTES NFR BLD AUTO: 10.4 % — SIGNIFICANT CHANGE UP (ref 2–14)
NEUTROPHILS # BLD AUTO: 4.27 K/UL — SIGNIFICANT CHANGE UP (ref 1.8–7.4)
NEUTROPHILS NFR BLD AUTO: 71.6 % — SIGNIFICANT CHANGE UP (ref 43–77)
NITRITE UR-MCNC: NEGATIVE — SIGNIFICANT CHANGE UP
PH UR: 6 — SIGNIFICANT CHANGE UP (ref 5–8)
PLATELET # BLD AUTO: 124 K/UL — LOW (ref 150–400)
POTASSIUM SERPL-MCNC: 3.7 MMOL/L — SIGNIFICANT CHANGE UP (ref 3.5–5.3)
POTASSIUM SERPL-SCNC: 3.7 MMOL/L — SIGNIFICANT CHANGE UP (ref 3.5–5.3)
PROT SERPL-MCNC: 6.9 G/DL — SIGNIFICANT CHANGE UP (ref 6.6–8.7)
PROT UR-MCNC: 30 MG/DL
RBC # BLD: 3.59 M/UL — LOW (ref 4.2–5.8)
RBC # FLD: 15.9 % — HIGH (ref 10.3–14.5)
RBC CASTS # UR COMP ASSIST: ABNORMAL /HPF (ref 0–4)
SODIUM SERPL-SCNC: 132 MMOL/L — LOW (ref 135–145)
SP GR SPEC: 1.02 — SIGNIFICANT CHANGE UP (ref 1.01–1.02)
UROBILINOGEN FLD QL: 1 MG/DL
WBC # BLD: 5.96 K/UL — SIGNIFICANT CHANGE UP (ref 3.8–10.5)
WBC # FLD AUTO: 5.96 K/UL — SIGNIFICANT CHANGE UP (ref 3.8–10.5)
WBC UR QL: ABNORMAL

## 2020-02-17 PROCEDURE — 72131 CT LUMBAR SPINE W/O DYE: CPT | Mod: 26

## 2020-02-17 PROCEDURE — 74176 CT ABD & PELVIS W/O CONTRAST: CPT | Mod: 26

## 2020-02-17 PROCEDURE — 99285 EMERGENCY DEPT VISIT HI MDM: CPT

## 2020-02-17 RX ORDER — ONDANSETRON 8 MG/1
4 TABLET, FILM COATED ORAL ONCE
Refills: 0 | Status: COMPLETED | OUTPATIENT
Start: 2020-02-17 | End: 2020-02-17

## 2020-02-17 RX ORDER — CEFTRIAXONE 500 MG/1
1000 INJECTION, POWDER, FOR SOLUTION INTRAMUSCULAR; INTRAVENOUS ONCE
Refills: 0 | Status: COMPLETED | OUTPATIENT
Start: 2020-02-17 | End: 2020-02-17

## 2020-02-17 RX ORDER — ACETAMINOPHEN 500 MG
650 TABLET ORAL ONCE
Refills: 0 | Status: COMPLETED | OUTPATIENT
Start: 2020-02-17 | End: 2020-02-17

## 2020-02-17 RX ORDER — CEPHALEXIN 500 MG
1 CAPSULE ORAL
Qty: 40 | Refills: 0
Start: 2020-02-17 | End: 2020-02-26

## 2020-02-17 RX ORDER — SODIUM CHLORIDE 9 MG/ML
500 INJECTION INTRAMUSCULAR; INTRAVENOUS; SUBCUTANEOUS ONCE
Refills: 0 | Status: COMPLETED | OUTPATIENT
Start: 2020-02-17 | End: 2020-02-17

## 2020-02-17 RX ORDER — OXYCODONE AND ACETAMINOPHEN 5; 325 MG/1; MG/1
1 TABLET ORAL ONCE
Refills: 0 | Status: DISCONTINUED | OUTPATIENT
Start: 2020-02-17 | End: 2020-02-17

## 2020-02-17 RX ADMIN — CEFTRIAXONE 100 MILLIGRAM(S): 500 INJECTION, POWDER, FOR SOLUTION INTRAMUSCULAR; INTRAVENOUS at 22:41

## 2020-02-17 RX ADMIN — ONDANSETRON 4 MILLIGRAM(S): 8 TABLET, FILM COATED ORAL at 22:47

## 2020-02-17 RX ADMIN — OXYCODONE AND ACETAMINOPHEN 1 TABLET(S): 5; 325 TABLET ORAL at 17:16

## 2020-02-17 RX ADMIN — OXYCODONE AND ACETAMINOPHEN 1 TABLET(S): 5; 325 TABLET ORAL at 18:16

## 2020-02-17 RX ADMIN — SODIUM CHLORIDE 500 MILLILITER(S): 9 INJECTION INTRAMUSCULAR; INTRAVENOUS; SUBCUTANEOUS at 22:47

## 2020-02-17 RX ADMIN — Medication 650 MILLIGRAM(S): at 22:47

## 2020-02-17 NOTE — ED PROVIDER NOTE - PROGRESS NOTE DETAILS
Aileen Salamanca, Resident: Pt noted to be positive for UTI, cetriaxone given. Awaiting CT scan. Findings relayed to pt and family.

## 2020-02-17 NOTE — ED PROVIDER NOTE - OBJECTIVE STATEMENT
Pt is an 85 y.o. 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 The pt states the pain is severe, constant, and exacerbated by pain.

## 2020-02-17 NOTE — ED PROVIDER NOTE - NS ED ROS FT
Constitutional: no fever, sweats, and no chills.  Eyes: no pain, no redness, and no visual changes.  ENMT: no ear pain and no hearing problems, no nasal congestion/drainage, no dysphagia, and no throat pain, no neck pain, no stiffness  CV: no chest pain, no edema.  Resp: no cough, no dyspnea  GI: no abdominal pain, no bloating, no constipation, no diarrhea  : no dysuria, no hematuria  MSK: no msk pain, no weakness  Skin: no lesions, and no rashes.  Neuro: no LOC, no headache, no sensory deficits

## 2020-02-17 NOTE — ED ADULT NURSE NOTE - OBJECTIVE STATEMENT
Pt awake, alert and oriented x3 presents to ED c/o chronic back pain.  pt states was seen at [facility] three days ago for same and dc'd home.  Chronic indwelling catheter in place, last changed 3 days ago at [facility].  Resp even and unlabored.  Denies chest pain.  Abdomen soft, nontender, nondistended.  currently undergoing hormonal replacement therapy for prostate ca.

## 2020-02-17 NOTE — ED ADULT NURSE NOTE - INTERVENTIONS DEFINITIONS
Instruct patient to call for assistance/Provide visual clues: red socks/Provide visual cue, wrist band, yellow gown, etc./Call bell, personal items and telephone within reach/[place] to call system

## 2020-02-17 NOTE — ED ADULT NURSE NOTE - CINV DISCH TEACH PARTICIP
Family/DC instructions given to daughter Juliette Family/Patient/Spouse/DC instructions given to daughter Juliette

## 2020-02-17 NOTE — ED PROVIDER NOTE - PHYSICAL EXAMINATION
General: well appearing, NAD  Head:  NC, AT  Eyes: EOMI, PERRLA, no scleral icterus  Ears: no external auditory erythema/drainage  Nose: midline, normal turbinates, no bleeding/drainage  Throat: uvula midline, no lesions, MMM  Cardiac: RRR, +murmur, no rubs or gallops, no lower extremity edema  Respiratory: CTABL, no wheezes/rales, equal chest wall expansions  Abdomen: soft, ND, NT, no rebound tenderness, no guarding, nonperitonitic. No CVA tenderness  MSK/Vascular: full ROM, distal pulses intact, soft compartments, warm extremities  Neuro: AAOx3, motor/sensory intact in extremities  Psych: calm, cooperative, normal affect

## 2020-02-17 NOTE — ED ADULT NURSE REASSESSMENT NOTE - NS ED NURSE REASSESS COMMENT FT1
Pt and pt family requesting to not change indwelling catheter as it was just changed 3  days ago and caused pt to bleed from site.  Urinary drainage bag changed to collect UA sample.

## 2020-02-17 NOTE — ED PROVIDER NOTE - CLINICAL SUMMARY MEDICAL DECISION MAKING FREE TEXT BOX
Pt with history of bladder cancer presenting with back/flank pain for three days. Labs and imaging. Will follow up. Pt with history of bladder cancer presenting with back/flank pain for three days. Labs and imaging. Will follow up.  repeat vitals done now with fever will add cx call back if no t sensitive to keflex dr vega wrote for hemodynamically stable will cover chronic morrison 2/2 uti f.u sacral findings d/w family return precautions given

## 2020-02-17 NOTE — ED PROVIDER NOTE - PMH
CVA (cerebral vascular accident)  w/ residual right hemiplegia  Graves disease    HLD (hyperlipidemia)    HTN (hypertension)    Hypertension    Mitral regurgitation    Prostate cancer

## 2020-02-17 NOTE — ED ADULT NURSE REASSESSMENT NOTE - NS ED NURSE REASSESS COMMENT FT1
Assumed pt. care at 2330 from off going RN. Pt. A&Ox3. Pt. respirations even and unlabored. Pt. resting comfortably in stretcher with at bedside. Pt. states he does not have pain if he doesn't move. Pt. indwelling morrison draining dark yellow urine. Pt. informed he is waiting for a CT scan. Assumed pt. care at 2330 from off going RN. Pt. A&Ox3. Pt. respirations even and unlabored. Pt. resting comfortably in stretcher with at bedside. Pt. states he does not have pain if he doesn't move. Pt. indwelling morrison draining dark yellow urine. Pt. informed he is waiting for a CT scan. Safety maintained with side rails up, stretcher in lowest position and fall risk bracelet on.

## 2020-02-17 NOTE — ED PROVIDER NOTE - PATIENT PORTAL LINK FT
You can access the FollowMyHealth Patient Portal offered by [facility] by registering at the following website: [email or web address]. By joining [employer]’s FollowMyHealth portal, you will also be able to view your health information using other applications (apps) compatible with our system.

## 2020-02-17 NOTE — ED PROVIDER NOTE - ATTENDING CONTRIBUTION TO CARE
I, Ray Delgadillo, performed a face to face bedside interview with this patient regarding history of present illness, review of symptoms and relevant past medical, social and family history.  I completed an independent physical examination. I have communicated the patient’s plan of care and disposition with the resident  85 year old male with PMH CVA with R sided hemiplegia, prostate cancer currently on hormonal therapy with chronic indwelling morrison last changed 3 days ago, presents with L sided flank pain x 3 days. Pt has  hematuria after morrison was changed but it has steadily improved. No abd pain, vomitign, fever, chills.   Gen: NAD, well appearing  CV: RRR  Pul: CTA b/l  Abd: Soft, non-distended, non-tender abd, +R CVA tenderness  Neuro: no focal deficits  Pt with soft abd, hematuria likely due to the morrison, pt with UTI, but is afebrile, VSS, no leukocytosis and stable for outpt mgt of pyelo

## 2020-02-17 NOTE — ED ADULT TRIAGE NOTE - CHIEF COMPLAINT QUOTE
pt arrive by ambulance with c/o back/pelvic pain since yesterday. hx lumbar problems. pt arrives with morrison cath secondary to prostate ca

## 2020-02-18 ENCOUNTER — INPATIENT (INPATIENT)
Facility: HOSPITAL | Age: 85
LOS: 7 days | Discharge: HOME | DRG: 698 | End: 2020-02-26
Attending: INTERNAL MEDICINE | Admitting: STUDENT IN AN ORGANIZED HEALTH CARE EDUCATION/TRAINING PROGRAM
Payer: MEDICAID

## 2020-02-18 VITALS
HEART RATE: 81 BPM | WEIGHT: 132.28 LBS | DIASTOLIC BLOOD PRESSURE: 65 MMHG | HEIGHT: 63 IN | OXYGEN SATURATION: 93 % | RESPIRATION RATE: 18 BRPM | TEMPERATURE: 98 F | SYSTOLIC BLOOD PRESSURE: 121 MMHG

## 2020-02-18 VITALS
HEART RATE: 92 BPM | TEMPERATURE: 100 F | RESPIRATION RATE: 18 BRPM | DIASTOLIC BLOOD PRESSURE: 60 MMHG | OXYGEN SATURATION: 94 % | SYSTOLIC BLOOD PRESSURE: 100 MMHG

## 2020-02-18 DIAGNOSIS — Z98.890 OTHER SPECIFIED POSTPROCEDURAL STATES: Chronic | ICD-10-CM

## 2020-02-18 DIAGNOSIS — R78.81 BACTEREMIA: ICD-10-CM

## 2020-02-18 LAB
ALBUMIN SERPL ELPH-MCNC: 2.8 G/DL — LOW (ref 3.3–5.2)
ALP SERPL-CCNC: 105 U/L — SIGNIFICANT CHANGE UP (ref 40–120)
ALT FLD-CCNC: 8 U/L — SIGNIFICANT CHANGE UP
ANION GAP SERPL CALC-SCNC: 10 MMOL/L — SIGNIFICANT CHANGE UP (ref 5–17)
APPEARANCE UR: ABNORMAL
AST SERPL-CCNC: 20 U/L — SIGNIFICANT CHANGE UP
BILIRUB SERPL-MCNC: 0.5 MG/DL — SIGNIFICANT CHANGE UP (ref 0.4–2)
BILIRUB UR-MCNC: ABNORMAL
BUN SERPL-MCNC: 14 MG/DL — SIGNIFICANT CHANGE UP (ref 8–20)
CALCIUM SERPL-MCNC: 8.6 MG/DL — SIGNIFICANT CHANGE UP (ref 8.6–10.2)
CHLORIDE SERPL-SCNC: 93 MMOL/L — LOW (ref 98–107)
CO2 SERPL-SCNC: 27 MMOL/L — SIGNIFICANT CHANGE UP (ref 22–29)
COLOR SPEC: ABNORMAL
CREAT SERPL-MCNC: 0.82 MG/DL — SIGNIFICANT CHANGE UP (ref 0.5–1.3)
DIFF PNL FLD: ABNORMAL
ENTEROCOC DNA BLD POS QL NAA+NON-PROBE: SIGNIFICANT CHANGE UP
EPI CELLS # UR: SIGNIFICANT CHANGE UP
GLUCOSE SERPL-MCNC: 98 MG/DL — SIGNIFICANT CHANGE UP (ref 70–99)
GLUCOSE UR QL: NEGATIVE MG/DL — SIGNIFICANT CHANGE UP
KETONES UR-MCNC: ABNORMAL
LACTATE BLDV-MCNC: 0.7 MMOL/L — SIGNIFICANT CHANGE UP (ref 0.5–2)
LEUKOCYTE ESTERASE UR-ACNC: ABNORMAL
METHOD TYPE: SIGNIFICANT CHANGE UP
NITRITE UR-MCNC: NEGATIVE — SIGNIFICANT CHANGE UP
PH UR: 5 — SIGNIFICANT CHANGE UP (ref 5–8)
POTASSIUM SERPL-MCNC: 3.8 MMOL/L — SIGNIFICANT CHANGE UP (ref 3.5–5.3)
POTASSIUM SERPL-SCNC: 3.8 MMOL/L — SIGNIFICANT CHANGE UP (ref 3.5–5.3)
PROT SERPL-MCNC: 6.7 G/DL — SIGNIFICANT CHANGE UP (ref 6.6–8.7)
PROT UR-MCNC: 30 MG/DL
RBC CASTS # UR COMP ASSIST: ABNORMAL /HPF (ref 0–4)
SODIUM SERPL-SCNC: 130 MMOL/L — LOW (ref 135–145)
SP GR SPEC: 1.02 — SIGNIFICANT CHANGE UP (ref 1.01–1.02)
UROBILINOGEN FLD QL: 4 MG/DL
WBC UR QL: SIGNIFICANT CHANGE UP

## 2020-02-18 PROCEDURE — 87150 DNA/RNA AMPLIFIED PROBE: CPT

## 2020-02-18 PROCEDURE — 87186 SC STD MICRODIL/AGAR DIL: CPT

## 2020-02-18 PROCEDURE — 87086 URINE CULTURE/COLONY COUNT: CPT

## 2020-02-18 PROCEDURE — 99223 1ST HOSP IP/OBS HIGH 75: CPT

## 2020-02-18 PROCEDURE — 84443 ASSAY THYROID STIM HORMONE: CPT

## 2020-02-18 PROCEDURE — 85027 COMPLETE CBC AUTOMATED: CPT

## 2020-02-18 PROCEDURE — 96366 THER/PROPH/DIAG IV INF ADDON: CPT

## 2020-02-18 PROCEDURE — 87040 BLOOD CULTURE FOR BACTERIA: CPT

## 2020-02-18 PROCEDURE — 96365 THER/PROPH/DIAG IV INF INIT: CPT

## 2020-02-18 PROCEDURE — 81001 URINALYSIS AUTO W/SCOPE: CPT

## 2020-02-18 PROCEDURE — 36415 COLL VENOUS BLD VENIPUNCTURE: CPT

## 2020-02-18 PROCEDURE — 99285 EMERGENCY DEPT VISIT HI MDM: CPT

## 2020-02-18 PROCEDURE — 74176 CT ABD & PELVIS W/O CONTRAST: CPT

## 2020-02-18 PROCEDURE — 99284 EMERGENCY DEPT VISIT MOD MDM: CPT | Mod: 25

## 2020-02-18 PROCEDURE — 80053 COMPREHEN METABOLIC PANEL: CPT

## 2020-02-18 PROCEDURE — 96375 TX/PRO/DX INJ NEW DRUG ADDON: CPT

## 2020-02-18 RX ORDER — ENOXAPARIN SODIUM 100 MG/ML
40 INJECTION SUBCUTANEOUS DAILY
Refills: 0 | Status: DISCONTINUED | OUTPATIENT
Start: 2020-02-18 | End: 2020-02-26

## 2020-02-18 RX ORDER — VANCOMYCIN HCL 1 G
1000 VIAL (EA) INTRAVENOUS ONCE
Refills: 0 | Status: COMPLETED | OUTPATIENT
Start: 2020-02-18 | End: 2020-02-18

## 2020-02-18 RX ORDER — ATORVASTATIN CALCIUM 80 MG/1
20 TABLET, FILM COATED ORAL AT BEDTIME
Refills: 0 | Status: DISCONTINUED | OUTPATIENT
Start: 2020-02-18 | End: 2020-02-26

## 2020-02-18 RX ORDER — METOPROLOL TARTRATE 50 MG
0.5 TABLET ORAL
Qty: 0 | Refills: 0 | DISCHARGE

## 2020-02-18 RX ORDER — VANCOMYCIN HCL 1 G
1000 VIAL (EA) INTRAVENOUS EVERY 12 HOURS
Refills: 0 | Status: DISCONTINUED | OUTPATIENT
Start: 2020-02-18 | End: 2020-02-19

## 2020-02-18 RX ORDER — SODIUM CHLORIDE 9 MG/ML
1000 INJECTION INTRAMUSCULAR; INTRAVENOUS; SUBCUTANEOUS ONCE
Refills: 0 | Status: COMPLETED | OUTPATIENT
Start: 2020-02-18 | End: 2020-02-18

## 2020-02-18 RX ORDER — MORPHINE SULFATE 50 MG/1
2 CAPSULE, EXTENDED RELEASE ORAL ONCE
Refills: 0 | Status: DISCONTINUED | OUTPATIENT
Start: 2020-02-18 | End: 2020-02-18

## 2020-02-18 RX ORDER — METOPROLOL TARTRATE 50 MG
12.5 TABLET ORAL DAILY
Refills: 0 | Status: DISCONTINUED | OUTPATIENT
Start: 2020-02-18 | End: 2020-02-26

## 2020-02-18 RX ORDER — OXYCODONE HYDROCHLORIDE 5 MG/1
5 TABLET ORAL EVERY 6 HOURS
Refills: 0 | Status: DISCONTINUED | OUTPATIENT
Start: 2020-02-18 | End: 2020-02-20

## 2020-02-18 RX ORDER — LIDOCAINE 4 G/100G
1 CREAM TOPICAL ONCE
Refills: 0 | Status: COMPLETED | OUTPATIENT
Start: 2020-02-18 | End: 2020-02-18

## 2020-02-18 RX ORDER — TAMSULOSIN HYDROCHLORIDE 0.4 MG/1
0.4 CAPSULE ORAL AT BEDTIME
Refills: 0 | Status: DISCONTINUED | OUTPATIENT
Start: 2020-02-18 | End: 2020-02-26

## 2020-02-18 RX ORDER — BICALUTAMIDE 50 MG/1
50 TABLET, FILM COATED ORAL DAILY
Refills: 0 | Status: DISCONTINUED | OUTPATIENT
Start: 2020-02-18 | End: 2020-02-26

## 2020-02-18 RX ORDER — ACETAMINOPHEN 500 MG
650 TABLET ORAL EVERY 6 HOURS
Refills: 0 | Status: DISCONTINUED | OUTPATIENT
Start: 2020-02-18 | End: 2020-02-26

## 2020-02-18 RX ORDER — ESCITALOPRAM OXALATE 10 MG/1
5 TABLET, FILM COATED ORAL DAILY
Refills: 0 | Status: DISCONTINUED | OUTPATIENT
Start: 2020-02-18 | End: 2020-02-26

## 2020-02-18 RX ADMIN — SODIUM CHLORIDE 500 MILLILITER(S): 9 INJECTION INTRAMUSCULAR; INTRAVENOUS; SUBCUTANEOUS at 00:18

## 2020-02-18 RX ADMIN — CEFTRIAXONE 1000 MILLIGRAM(S): 500 INJECTION, POWDER, FOR SOLUTION INTRAMUSCULAR; INTRAVENOUS at 00:18

## 2020-02-18 RX ADMIN — MORPHINE SULFATE 2 MILLIGRAM(S): 50 CAPSULE, EXTENDED RELEASE ORAL at 19:18

## 2020-02-18 RX ADMIN — LIDOCAINE 1 PATCH: 4 CREAM TOPICAL at 18:48

## 2020-02-18 RX ADMIN — Medication 250 MILLIGRAM(S): at 18:48

## 2020-02-18 RX ADMIN — MORPHINE SULFATE 2 MILLIGRAM(S): 50 CAPSULE, EXTENDED RELEASE ORAL at 18:48

## 2020-02-18 RX ADMIN — LIDOCAINE 1 PATCH: 4 CREAM TOPICAL at 19:00

## 2020-02-18 RX ADMIN — Medication 650 MILLIGRAM(S): at 00:38

## 2020-02-18 NOTE — ED PROVIDER NOTE - CLINICAL SUMMARY MEDICAL DECISION MAKING FREE TEXT BOX
Pt with history of bladder cancer presenting with back/flank pain for three days, +UTI, +pyelonephritis. Called back for +blood cultures x2 for gram+ bacteria, vancomycin started, labs, EKG, meds. Will follow up.

## 2020-02-18 NOTE — H&P ADULT - ASSESSMENT
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, Grave’s disease s/p treatment, HTN, chronic back pain likely due to old sacral fracture who was discharged from ED on 2/17 with UTI and was called back the following day for bacteremia     Enterococcus bacteremia due to UTI  -Urine cx growing gram positive organism but only 50-99K CFU  -Received vancomycin in ED, will continue  -Repeat blood cultures and urine cultures pending  -TTE ordered to assess for any vegetations   -ID consulted    pAFib  -Previously on Eliquis, was taken off due to hematuria  -Metoprolol resumed     HTN  -On metoprolol    Hx of CVA  -On Lipitor, not on ASA     Depression  -On Lexapro    Urinary retention with chronic Silva  -Silva changed in [facility] ED on 2/17, on Flomax    Chronic pain in setting of chronic sacral fracture  -Pain control with Tylenol and oxycodone    Prostate cancer  -On bicalutamide     Prophylactic measure  -Lovenox

## 2020-02-18 NOTE — H&P ADULT - NSHPLABSRESULTS_GEN_ALL_CORE
8.6    5.22  )-----------( 127      ( 18 Feb 2020 19:28 )             27.9       02-18    130<L>  |  93<L>  |  14.0  ----------------------------<  98  3.8   |  27.0  |  0.82    Ca    8.6      18 Feb 2020 18:31    TPro  6.7  /  Alb  2.8<L>  /  TBili  0.5  /  DBili  x   /  AST  20  /  ALT  8   /  AlkPhos  105  02-18

## 2020-02-18 NOTE — ED ADULT NURSE NOTE - OBJECTIVE STATEMENT
Received patient awake, alert, orientedx4.  Complains of right flank pain for past 3 cays, was in ED yesterday and found to have UTI, called back today for positive blood cultures.  Patient has morrison catheter in, chronic, changed yesterday in ED.

## 2020-02-18 NOTE — ED PROVIDER NOTE - ATTENDING CONTRIBUTION TO CARE
I, Orlando Jones, personally saw the patient with the resident, and completed the key components of the history and physical exam. I then discussed the management plan with the resident.    84 yo M hx of prostate CA on hormone therapy, chronic morrison (changed yesterday), mitral regurg, HTN, CVA with right sided deficit. Patient was seen yesterday of for right flank pain and concerned by pyelonephritis and chronic right sacral fracture. He was discharged home on keflex. He was called back for bacteremia of gram + in cocci and chain. Urine culture showed gram + bacteria. lactate negative 0.7. wbc 5.2. Vancomycin given. patient admitted for bacteremia secondary to UTI.

## 2020-02-18 NOTE — H&P ADULT - HISTORY OF PRESENT ILLNESS
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, Grave’s disease s/p treatment, HTN, chronic back pain due to old sacral fracture who was called back to ED for positive blood cultures.  Pt originally presented to [facility] ED yesterday with complaints of right flank pain associated with nausea, vomiting, poor appetite and subjective fevers for a few days.  Yesterday in ED, he had positive UA, his Silva was replaced and CT abd/pelvis was done which showed renal cysts and bladder stones, but did not show any perinephric stranding.  Pt was discharged on Keflex with clinical diagnosis of pyelonephritis.  Blood cultures positive for enterococcus and pt called back to ED where he received vanco x1.  During interview, pt still endorsing nausea and poor appetite.  Of note, CT abd/pelvis also showed suspected nondisplaced chronic insufficiency of right-sided chronic sacral fracture.

## 2020-02-18 NOTE — H&P ADULT - NSICDXPASTMEDICALHX_GEN_ALL_CORE_FT
PAST MEDICAL HISTORY:  Basal cell carcinoma (BCC)     CVA (cerebral vascular accident) w/ residual right hemiplegia    Graves disease     HLD (hyperlipidemia)     HTN (hypertension)     Hypertension     Mitral regurgitation     Prostate cancer

## 2020-02-18 NOTE — ED PROVIDER NOTE - PHYSICAL EXAMINATION
Head:  NC, AT  Eyes: EOMI, PERRLA, no scleral icterus  Ears: no external auditory erythema/drainage  Nose: midline, normal turbinates, no bleeding/drainage  Throat: uvula midline, no lesions, MMM  Cardiac: RRR, +murmur, no rubs or gallops, no lower extremity edema  Respiratory: CTABL, no wheezes/rales, equal chest wall expansions  Abdomen: soft, ND, NT, no rebound tenderness, no guarding, nonperitonitic. +R. CVA tenderness  MSK/Vascular: full ROM, distal pulses intact, soft compartments, warm extremities  Neuro: AAOx3, motor/sensory intact in extremities  Psych: calm, cooperative, normal affect

## 2020-02-18 NOTE — ED ADULT TRIAGE NOTE - CHIEF COMPLAINT QUOTE
pt received a call for positive blood cultures. pt was seen in [facility] yesterday. pt c/o groin pain

## 2020-02-18 NOTE — ED PROVIDER NOTE - OBJECTIVE STATEMENT
Pt is an 85 y.o. M hx of prostate cancer on hormonal therapy medication, chronic indwelling morrison catheter last changed yesterday, mitral regurgitation, graves disease, HTN, CVA in 2008 with residual right hemiplegia, chronic back pain presenting with back and right flank pain for three days. The pt states the pain is severe, constant, and exacerbated by pain. Yesterday found to have +UTI, morrison changed, CT scan suggestive of pyelonephritis. Discharged with oral antibiotics. Pt called in because +blood cultures x2 for gram + cocci in chains and pairs. Pt continues to have flank pain. No other new findings, no fevers/sweats/chills.

## 2020-02-18 NOTE — ED PROVIDER NOTE - NS ED ROS FT
Constitutional: no fever, sweats, and no chills.  Eyes: no pain, no redness, and no visual changes.  ENMT: no ear pain and no hearing problems, no nasal congestion/drainage, no dysphagia, and no throat pain, no neck pain, no stiffness  CV: no chest pain, no edema.  Resp: no cough, no dyspnea  GI: no abdominal pain, no bloating, no constipation, no diarrhea  : no dysuria, no hematuria  MSK: no weakness  Skin: no lesions, and no rashes.  Neuro: no LOC, no headache, no sensory deficits

## 2020-02-18 NOTE — H&P ADULT - NSHPPHYSICALEXAM_GEN_ALL_CORE
Vital Signs Last 24 Hrs  T(C): 36.5 (18 Feb 2020 17:05), Max: 37.5 (18 Feb 2020 02:24)  T(F): 97.7 (18 Feb 2020 17:05), Max: 99.5 (18 Feb 2020 02:24)  HR: 84 (18 Feb 2020 22:50) (81 - 92)  BP: 136/74 (18 Feb 2020 22:50) (100/60 - 147/91)  BP(mean): --  RR: 17 (18 Feb 2020 22:50) (17 - 18)  SpO2: 97% (18 Feb 2020 22:50) (93% - 97%)    GENERAL:  Well-appearing, not in acute distress  EYES:  Clear conjunctiva, extraocular movement intact  ENT: Moist mucous membranes  RESP:  Non-labored breathing pattern, lungs clear to ausculation   CV: Regular rate and rhythm, no murmurs appreciated, no leg edema  GI: Soft, non-tender, non-distended  : Silva in place with dark orange urine  NEURO: Awake, alert, conversant, able to lift both arms above head, barely able to lift both legs off bed. able to wiggle toes  PSYCH: Calm, cooperative  SKIN: No rash or lesions, warm and dry

## 2020-02-19 DIAGNOSIS — N39.0 URINARY TRACT INFECTION, SITE NOT SPECIFIED: ICD-10-CM

## 2020-02-19 DIAGNOSIS — C61 MALIGNANT NEOPLASM OF PROSTATE: ICD-10-CM

## 2020-02-19 DIAGNOSIS — N21.0 CALCULUS IN BLADDER: ICD-10-CM

## 2020-02-19 PROBLEM — I34.0 NONRHEUMATIC MITRAL (VALVE) INSUFFICIENCY: Chronic | Status: ACTIVE | Noted: 2020-02-17

## 2020-02-19 PROBLEM — E05.00 THYROTOXICOSIS WITH DIFFUSE GOITER WITHOUT THYROTOXIC CRISIS OR STORM: Chronic | Status: ACTIVE | Noted: 2020-02-17

## 2020-02-19 PROBLEM — E78.5 HYPERLIPIDEMIA, UNSPECIFIED: Chronic | Status: ACTIVE | Noted: 2020-02-17

## 2020-02-19 PROBLEM — I10 ESSENTIAL (PRIMARY) HYPERTENSION: Chronic | Status: ACTIVE | Noted: 2020-02-17

## 2020-02-19 PROBLEM — I63.9 CEREBRAL INFARCTION, UNSPECIFIED: Chronic | Status: ACTIVE | Noted: 2020-02-17

## 2020-02-19 LAB
-  AMPICILLIN: SIGNIFICANT CHANGE UP
-  CIPROFLOXACIN: SIGNIFICANT CHANGE UP
-  GENTAMICIN SYNERGY: SIGNIFICANT CHANGE UP
-  GENTAMICIN SYNERGY: SIGNIFICANT CHANGE UP
-  LEVOFLOXACIN: SIGNIFICANT CHANGE UP
-  NITROFURANTOIN: SIGNIFICANT CHANGE UP
-  STREPTOMYCIN SYNERGY: SIGNIFICANT CHANGE UP
-  STREPTOMYCIN SYNERGY: SIGNIFICANT CHANGE UP
-  TETRACYCLINE: SIGNIFICANT CHANGE UP
-  VANCOMYCIN: SIGNIFICANT CHANGE UP
ALBUMIN SERPL ELPH-MCNC: 2.2 G/DL — LOW (ref 3.3–5.2)
ALP SERPL-CCNC: 99 U/L — SIGNIFICANT CHANGE UP (ref 40–120)
ALT FLD-CCNC: 6 U/L — SIGNIFICANT CHANGE UP
ANION GAP SERPL CALC-SCNC: 12 MMOL/L — SIGNIFICANT CHANGE UP (ref 5–17)
AST SERPL-CCNC: 17 U/L — SIGNIFICANT CHANGE UP
BASOPHILS # BLD AUTO: 0.01 K/UL — SIGNIFICANT CHANGE UP (ref 0–0.2)
BASOPHILS NFR BLD AUTO: 0.2 % — SIGNIFICANT CHANGE UP (ref 0–2)
BILIRUB SERPL-MCNC: 0.4 MG/DL — SIGNIFICANT CHANGE UP (ref 0.4–2)
BUN SERPL-MCNC: 10 MG/DL — SIGNIFICANT CHANGE UP (ref 8–20)
CALCIUM SERPL-MCNC: 8.2 MG/DL — LOW (ref 8.6–10.2)
CHLORIDE SERPL-SCNC: 95 MMOL/L — LOW (ref 98–107)
CO2 SERPL-SCNC: 25 MMOL/L — SIGNIFICANT CHANGE UP (ref 22–29)
CREAT SERPL-MCNC: 0.63 MG/DL — SIGNIFICANT CHANGE UP (ref 0.5–1.3)
CRP SERPL-MCNC: 14.45 MG/DL — HIGH (ref 0–0.4)
CULTURE RESULTS: SIGNIFICANT CHANGE UP
EOSINOPHIL # BLD AUTO: 0.11 K/UL — SIGNIFICANT CHANGE UP (ref 0–0.5)
EOSINOPHIL NFR BLD AUTO: 2.1 % — SIGNIFICANT CHANGE UP (ref 0–6)
ERYTHROCYTE [SEDIMENTATION RATE] IN BLOOD: 55 MM/HR — HIGH (ref 0–20)
GLUCOSE SERPL-MCNC: 131 MG/DL — HIGH (ref 70–99)
HCT VFR BLD CALC: 25 % — LOW (ref 39–50)
HGB BLD-MCNC: 7.8 G/DL — LOW (ref 13–17)
IMM GRANULOCYTES NFR BLD AUTO: 0.4 % — SIGNIFICANT CHANGE UP (ref 0–1.5)
LYMPHOCYTES # BLD AUTO: 0.9 K/UL — LOW (ref 1–3.3)
LYMPHOCYTES # BLD AUTO: 17 % — SIGNIFICANT CHANGE UP (ref 13–44)
MCHC RBC-ENTMCNC: 25.2 PG — LOW (ref 27–34)
MCHC RBC-ENTMCNC: 31.2 GM/DL — LOW (ref 32–36)
MCV RBC AUTO: 80.9 FL — SIGNIFICANT CHANGE UP (ref 80–100)
METHOD TYPE: SIGNIFICANT CHANGE UP
MONOCYTES # BLD AUTO: 0.58 K/UL — SIGNIFICANT CHANGE UP (ref 0–0.9)
MONOCYTES NFR BLD AUTO: 10.9 % — SIGNIFICANT CHANGE UP (ref 2–14)
NEUTROPHILS # BLD AUTO: 3.68 K/UL — SIGNIFICANT CHANGE UP (ref 1.8–7.4)
NEUTROPHILS NFR BLD AUTO: 69.4 % — SIGNIFICANT CHANGE UP (ref 43–77)
ORGANISM # SPEC MICROSCOPIC CNT: SIGNIFICANT CHANGE UP
PLATELET # BLD AUTO: 127 K/UL — LOW (ref 150–400)
POTASSIUM SERPL-MCNC: 3.3 MMOL/L — LOW (ref 3.5–5.3)
POTASSIUM SERPL-SCNC: 3.3 MMOL/L — LOW (ref 3.5–5.3)
PROT SERPL-MCNC: 6.3 G/DL — LOW (ref 6.6–8.7)
RBC # BLD: 3.09 M/UL — LOW (ref 4.2–5.8)
RBC # FLD: 15.9 % — HIGH (ref 10.3–14.5)
SODIUM SERPL-SCNC: 132 MMOL/L — LOW (ref 135–145)
SPECIMEN SOURCE: SIGNIFICANT CHANGE UP
WBC # BLD: 5.3 K/UL — SIGNIFICANT CHANGE UP (ref 3.8–10.5)
WBC # FLD AUTO: 5.3 K/UL — SIGNIFICANT CHANGE UP (ref 3.8–10.5)

## 2020-02-19 PROCEDURE — 71046 X-RAY EXAM CHEST 2 VIEWS: CPT | Mod: 26

## 2020-02-19 PROCEDURE — 93306 TTE W/DOPPLER COMPLETE: CPT | Mod: 26

## 2020-02-19 PROCEDURE — 99222 1ST HOSP IP/OBS MODERATE 55: CPT

## 2020-02-19 PROCEDURE — 93010 ELECTROCARDIOGRAM REPORT: CPT

## 2020-02-19 PROCEDURE — 99233 SBSQ HOSP IP/OBS HIGH 50: CPT

## 2020-02-19 PROCEDURE — 99223 1ST HOSP IP/OBS HIGH 75: CPT

## 2020-02-19 RX ORDER — AMPICILLIN TRIHYDRATE 250 MG
2 CAPSULE ORAL EVERY 4 HOURS
Refills: 0 | Status: DISCONTINUED | OUTPATIENT
Start: 2020-02-19 | End: 2020-02-26

## 2020-02-19 RX ORDER — CEFTRIAXONE 500 MG/1
2000 INJECTION, POWDER, FOR SOLUTION INTRAMUSCULAR; INTRAVENOUS EVERY 12 HOURS
Refills: 0 | Status: DISCONTINUED | OUTPATIENT
Start: 2020-02-19 | End: 2020-02-26

## 2020-02-19 RX ORDER — POTASSIUM CHLORIDE 20 MEQ
40 PACKET (EA) ORAL EVERY 4 HOURS
Refills: 0 | Status: COMPLETED | OUTPATIENT
Start: 2020-02-19 | End: 2020-02-19

## 2020-02-19 RX ADMIN — OXYCODONE HYDROCHLORIDE 5 MILLIGRAM(S): 5 TABLET ORAL at 14:50

## 2020-02-19 RX ADMIN — OXYCODONE HYDROCHLORIDE 5 MILLIGRAM(S): 5 TABLET ORAL at 22:13

## 2020-02-19 RX ADMIN — LIDOCAINE 1 PATCH: 4 CREAM TOPICAL at 08:00

## 2020-02-19 RX ADMIN — Medication 40 MILLIEQUIVALENT(S): at 14:52

## 2020-02-19 RX ADMIN — Medication 250 MILLIGRAM(S): at 05:20

## 2020-02-19 RX ADMIN — ESCITALOPRAM OXALATE 5 MILLIGRAM(S): 10 TABLET, FILM COATED ORAL at 14:50

## 2020-02-19 RX ADMIN — TAMSULOSIN HYDROCHLORIDE 0.4 MILLIGRAM(S): 0.4 CAPSULE ORAL at 22:04

## 2020-02-19 RX ADMIN — CEFTRIAXONE 100 MILLIGRAM(S): 500 INJECTION, POWDER, FOR SOLUTION INTRAMUSCULAR; INTRAVENOUS at 17:53

## 2020-02-19 RX ADMIN — ATORVASTATIN CALCIUM 20 MILLIGRAM(S): 80 TABLET, FILM COATED ORAL at 22:04

## 2020-02-19 RX ADMIN — OXYCODONE HYDROCHLORIDE 5 MILLIGRAM(S): 5 TABLET ORAL at 23:00

## 2020-02-19 RX ADMIN — OXYCODONE HYDROCHLORIDE 5 MILLIGRAM(S): 5 TABLET ORAL at 15:50

## 2020-02-19 RX ADMIN — Medication 216 GRAM(S): at 19:59

## 2020-02-19 RX ADMIN — Medication 12.5 MILLIGRAM(S): at 05:21

## 2020-02-19 RX ADMIN — BICALUTAMIDE 50 MILLIGRAM(S): 50 TABLET, FILM COATED ORAL at 17:31

## 2020-02-19 RX ADMIN — SODIUM CHLORIDE 1000 MILLILITER(S): 9 INJECTION INTRAMUSCULAR; INTRAVENOUS; SUBCUTANEOUS at 00:15

## 2020-02-19 RX ADMIN — OXYCODONE HYDROCHLORIDE 5 MILLIGRAM(S): 5 TABLET ORAL at 05:21

## 2020-02-19 RX ADMIN — Medication 40 MILLIEQUIVALENT(S): at 20:08

## 2020-02-19 NOTE — CHART NOTE - NSCHARTNOTEFT_GEN_A_CORE
Called by RN to report patient EKG had been performed and needed review. EKG with NSR, 1st degree AV block, peaked T waves noted. No prior EKGs in chart for comparison. Stat potassium ordered, will follow. Patient seen and examined with wife at bedside. Pleasantly confused, poor historian. Alert and oriented to person and place. Vital signs stable. RRR, +s1, s2. Denies chest pain, SOB, abdominal pain, n/v/d/c, fevers, chills. Actively being followed by cardiology.

## 2020-02-19 NOTE — PHARMACOTHERAPY INTERVENTION NOTE - COMMENTS
Ampicillin sensitive enterococcus in the blood.  De-escalated from vancomycin to ampicillin.  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  Discussed with ID.

## 2020-02-19 NOTE — CONSULT NOTE ADULT - SUBJECTIVE AND OBJECTIVE BOX
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  Pt originally presented to [facility] ED 2/17/2020 with complaints of right flank pain associated with nausea, vomiting, poor appetite and subjective fevers for a few days.  he had positive UA, his Miner was replaced and CT abd/pelvis was done which showed renal cysts and bladder stones, but did not show any perinephric stranding.  Pt was discharged on Keflex with clinical diagnosis of pyelonephritis.    Blood cultures positive for enterococcus and pt called back to ED where he received vanco x1.  During interview, pt still endorsing nausea and poor appetite.  Of note, CT abd/pelvis also showed suspected nondisplaced chronic insufficiency of right-sided chronic sacral fracture.     In the last 4-5 days, he had lumbar pain before coming in.   per his daughter, Eleni, on phone, patient had been hospitalized multiple times in november and december 2019.  he was treated at [facility] at that time.  Cultures from [facility] was positive for Staph epidermidis from a urine culture on 12/23/2019, a repeat culture on 2/14/2020 was negative from urine.   His daughter reports that in the last few months, he had intermittent fever and hypothermia from 38.3c to 37.7.  He would get better on a course of Augmentin at home, then get fevers after stopping.  He was then restarted on Augmentin.     Cultures from here now with 8 of 8 bottles of Enterococcus in blood culture.   repeat cx sent.    patient reports that he had severe MITRAL REGURG as well      I have personally reviewed the labs and data; pertinent labs and data are listed in this note; please see below.   =======================================================  Past Medical & Surgical Hx:  =====================  PAST MEDICAL & SURGICAL HISTORY:  Basal cell carcinoma (BCC)  HLD (hyperlipidemia)  CVA (cerebral vascular accident): w/ residual right hemiplegia  HTN (hypertension)  Mitral regurgitation  Hypertension  Graves disease  Prostate cancer  S/P skin cancer resection    Problem List:  ==========  HEALTH ISSUES - PROBLEM Dx:    Social Hx:  =======  no toxic habits currently    FAMILY HISTORY:  No pertinent family history in first degree relatives  no significant family history of immunosuppressive disorders in mother or father   =======================================================    REVIEW OF SYSTEMS:  CONSTITUTIONAL:  FATIGUE , FEVERS  HEENT:  No diplopia or blurred vision.  No earache, sore throat or runny nose.  CARDIOVASCULAR:  No pressure, squeezing, strangling, tightness, heaviness or aching about the chest, neck, axilla or epigastrium.  RESPIRATORY:  No cough, shortness of breath  GASTROINTESTINAL:  No nausea, vomiting or diarrhea.  GENITOURINARY:  No dysuria, frequency or urgency. No Blood in urine  MUSCULOSKELETAL:  BACK PAIN  SKIN:  No change in skin, hair or nails.  NEUROLOGIC:  No Headaches, seizures or weakness.  PSYCHIATRIC:  No disorder of thought or mood.  ENDOCRINE:  No heat or cold intolerance  HEMATOLOGICAL:  No easy bruising or bleeding.   =======================================================  Allergies    iodine (Swelling)      Antibiotics:  vancomycin  IVPB 1000 milliGRAM(s) IV Intermittent every 12 hours    Other medications:  atorvastatin 20 milliGRAM(s) Oral at bedtime  bicalutamide 50 milliGRAM(s) Oral daily  enoxaparin Injectable 40 milliGRAM(s) SubCutaneous daily  escitalopram 5 milliGRAM(s) Oral daily  metoprolol succinate ER 12.5 milliGRAM(s) Oral daily  tamsulosin 0.4 milliGRAM(s) Oral at bedtime     cefTRIAXone   IVPB   100 mL/Hr IV Intermittent (02-17-20 @ 22:41)  vancomycin  IVPB   250 mL/Hr IV Intermittent (02-18-20 @ 18:48)  vancomycin  IVPB   250 mL/Hr IV Intermittent (02-19-20 @ 05:20)      ======================================================  Physical Exam:  ============  T(F): 98.4 (19 Feb 2020 08:35), Max: 98.6 (19 Feb 2020 04:27)  HR: 83 (19 Feb 2020 08:35)  BP: 113/65 (19 Feb 2020 08:35)  RR: 16 (19 Feb 2020 08:35)  SpO2: 98% (19 Feb 2020 08:35) (93% - 98%)  temp max in last 48H T(F): , Max: 100.7 (02-17-20 @ 23:28)Height (cm): 160.02 (02-18-20 @ 17:05)  Weight (kg): 60 (02-18-20 @ 17:05)  BMI (kg/m2): 23.4 (02-18-20 @ 17:05)  BSA (m2): 1.62 (02-18-20 @ 17:05)    General:  No acute distress.  FRAIL  Eye: Pupils are equal, round and reactive to light, Extraocular movements are intact, Normal conjunctiva.  HENT: Normocephalic, Oral mucosa is moist, No pharyngeal erythema, No sinus tenderness.  CONJUNCTIVAL PALLOR, NO HEMORRHAGES.   POOR DENTITION, WITH MISSING TEETH, GINGIVAL INFLAMMATION  Neck: Supple, No lymphadenopathy.  Respiratory: Lungs WITH FAIR AIR ENTRY  Cardiovascular: Normal rate, Regular rhythm,  HARSH SYSTOLIC MURMUR  Gastrointestinal: Soft, Non-tender, Non-distended, Normal bowel sounds.  Genitourinary: No costovertebral angle tenderness.  MINER WITH CLEAR URINE  Lymphatics: No lymphadenopathy neck,   Musculoskeletal: Normal range of motion, Normal strength.  Integumentary: No rash.  Neurologic: Alert, Oriented, No focal deficits, Cranial Nerves II-XII are grossly intact.  Psychiatric: Appropriate mood & affect.    =======================================================  Labs:                        7.8    5.30  )-----------( 127      ( 19 Feb 2020 05:51 )             25.0       WBC Count: 5.30 K/uL (02-19-20 @ 05:51)  WBC Count: 5.22 K/uL (02-18-20 @ 19:28)  WBC Count: 5.96 K/uL (02-17-20 @ 18:32)      02-19    132<L>  |  95<L>  |  10.0  ----------------------------<  131<H>  3.3<L>   |  25.0  |  0.63    Ca    8.2<L>      19 Feb 2020 05:51    TPro  6.3<L>  /  Alb  2.2<L>  /  TBili  0.4  /  DBili  x   /  AST  17  /  ALT  6   /  AlkPhos  99  02-19      Culture - Blood (02.18.20 @ 18:39)    Specimen Source: .Blood    Culture Results:   Growth in aerobic and anaerobic bottles: Gram Positive Cocci in Pairs and  Chains  Aerobic Bottle: 12:18 Hours to positivity  Anaerobic Bottle: 12:28 Hours to positivity  TYPE: (C=Critical, N=Notification, A=Abnormal) C  TESTS:  _ Positive BloodGS  DATE/TIME CALLED: _ 02/19/2020 10:10  CALLED TO: _ MIRACLEHR: Marian Schulte RN  READ BACK (2 Patient Identifiers)(Y/N): _ Y  READ BACK VALUES (Y/N): _ Y  CALLED BY: Venus carlton    Culture - Blood (02.18.20 @ 18:39)    Specimen Source: .Blood    Culture Results:   Growth in aerobic and anaerobic bottles: Gram Positive Cocci in Pairs and  Chains  Aerobic Bottle: 12:08 Hours to positivity  Anaerobic Bottle: 12:28 Hours to positivity  TYPE: (C=Critical, N=Notification, A=Abnormal) C  TESTS:  _ Positive BloodGS  DATE/TIME CALLED: _ 02/19/2020 10:10  CALLED TO: _ ERHR: Marian Schulte RN  READ BACK (2 Patient Identifiers)(Y/N): _ Y  READ BACK VALUES (Y/N): _ Y  CALLED BY: Venus carlton    Culture - Blood (02.18.20 @ 00:50)    -  Enterococcus species: Detec    Specimen Source: .Blood    Organism: Blood Culture PCR    Culture Results:   Growth in aerobic and anaerobic bottles: Gram Positive Cocci in Pairs and  Chains  Anaerobic Bottle: 12:04 Hours to positivity  Aerobic Bottle: 13:08 Hours to positivity  ***Blood Panel PCR results on this specimen are available  approximately 3 hours after the Gram stain result.***  Gram stain, PCR, and/or culture results may not always  correspond due to difference in methodologies.  ************************************************************  This PCR assay was performed using [employer].  The following targets are tested for: Enterococcus,  vancomycin resistant enterococci, Listeria monocytogenes,  coagulase negative staphylococci, S. aureus,  methicillin resistant S. aureus, Streptococcus agalactiae  (Group B), S. pneumoniae, S. pyogenes (Group A),  Acinetobacter baumannii, Enterobacter cloacae, E. coli,  Klebsiella oxytoca, K. pneumoniae, Proteus sp.,  Serratia marcescens, Haemophilus influenzae,  Neisseria meningitidis, Pseudomonas aeruginosa, Candida  albicans, C. glabrata, C krusei, C parapsilosis,  C. tropicalis and the KPC resistance gene.  TYPE: (C=Critical, N=Notification, A=Abnormal)  TESTS:  _ bld gs  DATE/TIME CALLED: _ 02/18/2020 14:57:24  CALLED TO: Venus KINCAID  READ BACK (2 Patient Identifiers)(Y/N): _ Y  READ BACK VALUES (Y/N): _ Y  CALLED BY: _ dcashin    Organism Identification: Blood Culture PCR    Method Type: PCR      Culture - Urine (collected 02-17-20 @ 21:05)  Source: .Urine    Creatinine, Serum: 0.63 mg/dL (02-19-20 @ 05:51)  Creatinine, Serum: 0.82 mg/dL (02-18-20 @ 18:31)  Creatinine, Serum: 0.84 mg/dL (02-17-20 @ 18:32)    C-Reactive Protein, Serum: 14.45 mg/dL (02-19-20 @ 05:51)

## 2020-02-19 NOTE — CONSULT NOTE ADULT - SUBJECTIVE AND OBJECTIVE BOX
CLIFFORD DOSSE  [id number]      HPI:  86yo male PMHx PAF off A/C 2/2 hematuria, MVP with mod-severe MR prostate CA, diagnosed in 8/2019 on hormonal therapy, urinary retention with chronic Silva since 11/2019, 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, Grave’s disease s/p treatment, HTN, chronic back pain due to old sacral fracture who was called back to ED for positive blood cultures.  Pt originally presented to [facility] ED yesterday with complaints of right flank pain associated with nausea, vomiting, poor appetite and subjective fevers for a few days.  Yesterday in ED, he had positive UA, his Silva was replaced and CT abd/pelvis was done which showed renal cysts and bladder stones, but did not show any perinephric stranding.  Pt was discharged on Keflex with clinical diagnosis of pyelonephritis.  Blood cultures positive for enterococcus and pt called back to ED where he received vanco x1.        ALLERGIES:  iodine (Swelling)      PAST MEDICAL & SURGICAL HISTORY:  Basal cell carcinoma (BCC)  HLD (hyperlipidemia)  HTN (hypertension)  Otherwise, as noted above        MEDICATIONS (HOME):  · 	bicalutamide 50 mg oral tablet: Last Dose Taken:  , 1 tab(s) orally every 24 hours  · 	Lexapro 5 mg oral tablet: Last Dose Taken:  , 1 tab(s) orally once a day  · 	metoprolol succinate 25 mg oral tablet, extended release: Last Dose Taken:  , 0.5 tab(s) orally once a day  · 	tamsulosin 0.4 mg oral capsule: Last Dose Taken:  , 1 cap(s) orally once a day  · 	Lipitor 20 mg oral tablet: Last Dose Taken:  , 1 tab(s) orally once a day      SOCIAL HISTORY:  Patient denies alcohol, tobacco, drug use    FAMILY HISTORY:  No pertinent family history in first degree relatives      ROS:  Patient denies cough, and other than noted above full ROS is unremarkable      PHYSICAL EXAM:  Vital Signs Last 24 Hrs  T(C): 36.7 (19 Feb 2020 14:50), Max: 37 (19 Feb 2020 04:27)  T(F): 98 (19 Feb 2020 14:50), Max: 98.6 (19 Feb 2020 04:27)  HR: 77 (19 Feb 2020 14:50) (77 - 89)  BP: 119/72 (19 Feb 2020 14:50) (111/63 - 147/91)  BP(mean): --  RR: 18 (19 Feb 2020 14:50) (16 - 18)  SpO2: 97% (19 Feb 2020 14:50) (97% - 98%)  General: Patient comfortable in NAD  HEENT: NCAT, mmm, EOMI  Neck: no JVD, no carotid bruits  CVS: nl s1, split s2, no s3, +sys murmur at apex  Chest: CTA b/l  Abdomen: soft, nt/nd  Extremities: No c/c/e  Neuro: A&O x3  Psych: Normal affect          LABS:                        7.8    5.30  )-----------( 127      ( 19 Feb 2020 05:51 )             25.0     02-19    132<L>  |  95<L>  |  10.0  ----------------------------<  131<H>  3.3<L>   |  25.0  |  0.63    Ca    8.2<L>      19 Feb 2020 05:51    TPro  6.3<L>  /  Alb  2.2<L>  /  TBili  0.4  /  DBili  x   /  AST  17  /  ALT  6   /  AlkPhos  99  02-19          RADIOLOGY:  Echo:   1. Left ventricular ejection fraction, by visual estimation, is 55 to 60%.   2. Normal global left ventricular systolic function.   3. Spectral Doppler shows pseudonormal pattern of left ventricular myocardial filling (Grade II diastolic dysfunction). Elevated mean left atrial pressure.   4. Normal left ventricular internal cavity size.   5. Normal right ventricular size and function.   6. Severely enlarged left atrium.   7. Moderate thickening of the anterior and posterior mitral valve leaflets.   8. Moderate Mitral valve prolapse.   9. Severe mitral valve regurgitation.  10. Sclerotic aortic valve with normal opening.  11. Mild aortic regurgitation.  12. Moderate tricuspid regurgitation.  13. Estimated pulmonary artery systolic pressure is 77.9 mmHg assuming a right atrial pressure of 5 mmHg, which is consistent with severe pulmonary hypertension.  14. There is no evidence of pericardial effusion.          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  Cardiology called to eval for RONALD given bacteremia.  Patient with known h/o of mod MVP with mod-sev MR.  Echo shows preserved EF and normal LV size with mod MVP and severe MR.  No obvious vegetation.  Based on prior report appears grossly unchanged.  No evidence of CHF at this time.    Plan:  1. Check baseline EKG and CXR.  2. Plan RONALD tomorrow vs. Friday depending on scheduling.  3. Abx and further ID w/u per ID.  4. Continue current CV meds at current doses from home.  5. Remains off A/C 2/2 hematuria as had been as OP.    Case d/w patients cardiologist Dr. Quigley.

## 2020-02-19 NOTE — PROGRESS NOTE ADULT - ASSESSMENT
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

## 2020-02-19 NOTE — ED ADULT NURSE REASSESSMENT NOTE - NS ED NURSE REASSESS COMMENT FT1
report received from off going RN, chart as noted, pt a&ox3 resting on stretcher. pt repositioned for comfort, pillows used for right knee comfort. meal tray provided, tolerating po intake, VSS per flowsheet. MAEx4. catheter bag draining straw colored urine, pt reports no pain to abd @ this time. Safety maintained, call bell in reach, will continue to monitor and reassess. pending bed assignment
pt remains A+Ox4, in no apparent distress, wife at bedside. pt breathing even and unlabored. PALUMBO well x4. pt c/o back pain at this time, medicated for pain as ordered. pending admit bed at this time. pt educated on POC, verbalized understanding. pt safety measures maintained.

## 2020-02-19 NOTE — CONSULT NOTE ADULT - ASSESSMENT
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     Impression:  High grade bacteremia  Enterococcus species bacteremia  Mitral regurgitation  lumbar pain  fevers at home      Plan:  - concern raised for endocarditis  - should obtain RONALD to better assess valve, in view of mitral regurg per pt hx.   - daily blood cx x 3 more days - I HAVE ORDERED THIS  - continue vancomycin 1 gram Q 12H; pharmacology team to adjust.   - ESR and CRP ordered, pending    regarding back pain.   - this should be investigated with MRI to r/o infectious focus.     - follow up all outstanding cultures  - trend temperature and WBC curve  - repeat cultures from blood and all sources if febrile.
+ blood cx's: enterococcus, possible UTI    prostate cancer, on hormonal therapy    chronic indwelling morrison,     bladder calculi and bilateral renal cysts seen on CT (see report above)

## 2020-02-19 NOTE — CONSULT NOTE ADULT - ATTENDING COMMENTS
CT negative for obstruction  has bladder stones    IV antibiotics.  at discharge needs follow up with Dr. Vinson.

## 2020-02-19 NOTE — CONSULT NOTE ADULT - SUBJECTIVE AND OBJECTIVE BOX
SURGICAL PA NOTE: Urology consult, hx obtained form patient and chart, pt followed by outside urologist    STATUS POST:      POST OPERATIVE DAY #:     Vital Signs Last 24 Hrs  T(C): 36.7 (2020 14:50), Max: 37 (2020 04:27)  T(F): 98 (2020 14:50), Max: 98.6 (2020 04:27)  HR: 77 (2020 14:50) (77 - 89)  BP: 119/72 (2020 14:50) (111/63 - 147/91)  BP(mean): --  RR: 18 (2020 14:50) (16 - 18)  SpO2: 97% (2020 14:50) (93% - 98%)    HPI:  85yoM hx prostate CA, diagnosed in 2019 on hormonal therapy, urinary retention with chronic Morrison since 2019, pAfib off AC 2/2 hematuria, CVA () with residual right sided weakness, Grave’s disease s/p treatment, HTN, chronic back pain due to old sacral fracture who was called back to ED for positive blood cultures.  Pt originally presented to [facility] ED yesterday with complaints of right flank pain associated with nausea, vomiting, poor appetite and subjective fevers for a few days.  Yesterday in ED, he had positive UA, his Morrison was replaced and CT abd/pelvis was done which showed renal cysts and bladder stones, but did not show any perinephric stranding.  Pt was discharged on Keflex with clinical diagnosis of pyelonephritis.  Blood cultures positive for enterococcus and pt called back to ED where he received vanco x1.  During interview, pt still endorsing nausea and poor appetite.  Of note, CT abd/pelvis also showed suspected nondisplaced chronic insufficiency of right-sided chronic sacral fracture. (2020 22:06)      Bacteremia  No pertinent family history in first degree relatives  MEWS Score  Basal cell carcinoma (BCC)  HLD (hyperlipidemia)  CVA (cerebral vascular accident)  HTN (hypertension)  Mitral regurgitation  Hypertension  Graves disease  Prostate cancer  Bacteremia  S/P skin cancer resection  No significant past surgical history  UTI  1      SUBJECTIVE: Pt seen lying supine with HOB up on ER stretcher, c/o right sided low back pain, says morrison replaced yesterday in ER, denies fever/chills, has mild nausea and decreased appetite    Diet: clears    Activity: bedrest    Fevers: [ ]Yes [x]NO  Chills: [ ] Yes [x ] NO  SOB:  [ ] YES [x ] NO  Dyspnea: [ ]YES [x ]NO  Chest Discomfort: [ ] YES [x ] NO    Nausea: [x ] YES [ ] NO           Vomiting: [ ] YES [x ] NO  Flatus: [ ] YES [ ] NO             Bowel Movement: [ ] YES [ ] NO  Diarrhea: [ ] YES [ ] NO         Void: [ ]YES [ ]No  Constipation: [ ] YES [ ] NO       Pain (0-10):              Pain Control Adequate: [ ] YES [ ] NO    Morrison: indwelling    NGT:      I&O's Detail    I&O's Summary        MEDICATIONS  (STANDING):  ampicillin  IVPB 2 Gram(s) IV Intermittent every 4 hours  atorvastatin 20 milliGRAM(s) Oral at bedtime  bicalutamide 50 milliGRAM(s) Oral daily  cefTRIAXone   IVPB 2000 milliGRAM(s) IV Intermittent every 12 hours  enoxaparin Injectable 40 milliGRAM(s) SubCutaneous daily  escitalopram 5 milliGRAM(s) Oral daily  metoprolol succinate ER 12.5 milliGRAM(s) Oral daily  potassium chloride    Tablet ER 40 milliEquivalent(s) Oral every 4 hours  tamsulosin 0.4 milliGRAM(s) Oral at bedtime    MEDICATIONS  (PRN):  acetaminophen   Tablet .. 650 milliGRAM(s) Oral every 6 hours PRN Temp greater or equal to 38C (100.4F), Mild Pain (1 - 3), Moderate Pain (4 - 6)  oxyCODONE    IR 5 milliGRAM(s) Oral every 6 hours PRN Severe Pain (7 - 10)      LABS:                        7.8    5.30  )-----------( 127      ( 2020 05:51 )             25.0     02-19    132<L>  |  95<L>  |  10.0  ----------------------------<  131<H>  3.3<L>   |  25.0  |  0.63    Ca    8.2<L>      2020 05:51    TPro  6.3<L>  /  Alb  2.2<L>  /  TBili  0.4  /  DBili  x   /  AST  17  /  ALT  6   /  AlkPhos  99  -      Urinalysis Basic - ( 2020 18:35 )    Color: Yohana / Appearance: Slightly Turbid / S.025 / pH: x  Gluc: x / Ketone: Trace  / Bili: Small / Urobili: 4 mg/dL   Blood: x / Protein: 30 mg/dL / Nitrite: Negative   Leuk Esterase: Small / RBC: 6-10 /HPF / WBC 3-5   Sq Epi: x / Non Sq Epi: Few / Bacteria: x      Blood cx : + for enterococcus from       RADIOLOGY & ADDITIONAL STUDIES:     EXAM:  CT REFORM SPINE L                         EXAM:  CT RENAL STONE HUNT                          PROCEDURE DATE:  2020          INTERPRETATION:  CLINICAL INFORMATION: Abdominal and back pain, rule out renal stone or metastatic disease. History of prostate cancer.    COMPARISON: 2018    PROCEDURE:   CT of the Abdomen and Pelvis was performed without intravenous contrast in the prone position.  Intravenous contrast: None.  Oral contrast: None.  Sagittal and coronal reformats were performed.  Additional reconstructions of the lumbar spine was performed in axial, coronal and sagittal planes.    FINDINGS:    LOWER CHEST: Bilateral lower lobe bronchial wall thickening, stable. Stable 4 mm right lower lobe nodule. Mild cardiomegaly.    LIVER: Within normal limits.  BILE DUCTS: Normal caliber.  GALLBLADDER: Cholelithiasis.  SPLEEN: Within normal limits.  PANCREAS: Within normal limits.  ADRENALS: Stable nodular thickening of the left adrenal gland.  KIDNEYS/URETERS: Bilateral renal cysts. No hydronephrosis, perinephric stranding or shifting stone.    BLADDER: Decompressed around a Morrison catheter. Multiple round bladder stones noted. Additional tubular hyperdensities may reflect somewhat atypical stones.  REPRODUCTIVE ORGANS:Enlarged prostate.    BOWEL: No bowel obstruction. Appendix is normal.  PERITONEUM: No ascites.  VESSELS: Atherosclerotic changes.  RETROPERITONEUM/LYMPH NODES: No lymphadenopathy.    ABDOMINAL WALL: Small fat-containing umbilical hernia. Small fat-containing right inguinal hernia.  BONES: New ill-defined sclerosis within the right sacrum with suspected nondisplaced fracture deformity in this region, most likely reflective of chronic sacral fracture (example 3, 111). Stable small sclerotic lesionwithin the left sacrum. Multilevel degenerative changes of the spine including prominent erosive changes at the L3-L4 endplates, without significant change from prior exam. Stable grade 1 anterolisthesis at L4-L5. No acute displaced fracture identified. Multilevel canal and foraminal stenosis, including severe canal stenosis at L4-L5, not significantly changed.     IMPRESSION:       1. No hydronephrosis or obstructing urinary tract stone. Multiple stones noted within the bladder.  2. Suspected nondisplaced chronic insufficiency right-sided sacral fracture, new since prior exam. MRI or bone scan can be obtained for further evaluation.        BURKE SOUZA M.D., ATTENDING RADIOLOGIST  This document has been electronically signed.2020 11:07PM

## 2020-02-20 ENCOUNTER — APPOINTMENT (OUTPATIENT)
Dept: INTERNAL MEDICINE | Facility: CLINIC | Age: 85
End: 2020-02-20

## 2020-02-20 LAB
-  AMPICILLIN: SIGNIFICANT CHANGE UP
-  AMPICILLIN: SIGNIFICANT CHANGE UP
-  GENTAMICIN SYNERGY: SIGNIFICANT CHANGE UP
-  GENTAMICIN SYNERGY: SIGNIFICANT CHANGE UP
-  STREPTOMYCIN SYNERGY: SIGNIFICANT CHANGE UP
-  STREPTOMYCIN SYNERGY: SIGNIFICANT CHANGE UP
-  VANCOMYCIN: SIGNIFICANT CHANGE UP
-  VANCOMYCIN: SIGNIFICANT CHANGE UP
ANION GAP SERPL CALC-SCNC: 11 MMOL/L — SIGNIFICANT CHANGE UP (ref 5–17)
BLD GP AB SCN SERPL QL: SIGNIFICANT CHANGE UP
BUN SERPL-MCNC: 11 MG/DL — SIGNIFICANT CHANGE UP (ref 8–20)
CALCIUM SERPL-MCNC: 8.7 MG/DL — SIGNIFICANT CHANGE UP (ref 8.6–10.2)
CHLORIDE SERPL-SCNC: 98 MMOL/L — SIGNIFICANT CHANGE UP (ref 98–107)
CO2 SERPL-SCNC: 26 MMOL/L — SIGNIFICANT CHANGE UP (ref 22–29)
CREAT SERPL-MCNC: 0.63 MG/DL — SIGNIFICANT CHANGE UP (ref 0.5–1.3)
CULTURE RESULTS: SIGNIFICANT CHANGE UP
CULTURE RESULTS: SIGNIFICANT CHANGE UP
FERRITIN SERPL-MCNC: 193 NG/ML — SIGNIFICANT CHANGE UP (ref 30–400)
GLUCOSE SERPL-MCNC: 90 MG/DL — SIGNIFICANT CHANGE UP (ref 70–99)
HCT VFR BLD CALC: 26.4 % — LOW (ref 39–50)
HGB BLD-MCNC: 8.2 G/DL — LOW (ref 13–17)
IRON SATN MFR SERPL: 15 % — LOW (ref 16–55)
IRON SATN MFR SERPL: 29 UG/DL — LOW (ref 59–158)
MAGNESIUM SERPL-MCNC: 1.6 MG/DL — SIGNIFICANT CHANGE UP (ref 1.6–2.6)
MCHC RBC-ENTMCNC: 25.3 PG — LOW (ref 27–34)
MCHC RBC-ENTMCNC: 31.1 GM/DL — LOW (ref 32–36)
MCV RBC AUTO: 81.5 FL — SIGNIFICANT CHANGE UP (ref 80–100)
METHOD TYPE: SIGNIFICANT CHANGE UP
METHOD TYPE: SIGNIFICANT CHANGE UP
ORGANISM # SPEC MICROSCOPIC CNT: SIGNIFICANT CHANGE UP
PLATELET # BLD AUTO: 153 K/UL — SIGNIFICANT CHANGE UP (ref 150–400)
POTASSIUM SERPL-MCNC: 4.1 MMOL/L — SIGNIFICANT CHANGE UP (ref 3.5–5.3)
POTASSIUM SERPL-MCNC: 4.2 MMOL/L — SIGNIFICANT CHANGE UP (ref 3.5–5.3)
POTASSIUM SERPL-SCNC: 4.1 MMOL/L — SIGNIFICANT CHANGE UP (ref 3.5–5.3)
POTASSIUM SERPL-SCNC: 4.2 MMOL/L — SIGNIFICANT CHANGE UP (ref 3.5–5.3)
RBC # BLD: 3.24 M/UL — LOW (ref 4.2–5.8)
RBC # FLD: 16.1 % — HIGH (ref 10.3–14.5)
SODIUM SERPL-SCNC: 135 MMOL/L — SIGNIFICANT CHANGE UP (ref 135–145)
SPECIMEN SOURCE: SIGNIFICANT CHANGE UP
SPECIMEN SOURCE: SIGNIFICANT CHANGE UP
TIBC SERPL-MCNC: 197 UG/DL — LOW (ref 220–430)
TRANSFERRIN SERPL-MCNC: 138 MG/DL — LOW (ref 180–329)
VANCOMYCIN TROUGH SERPL-MCNC: 6.4 UG/ML — LOW (ref 10–20)
VIT B12 SERPL-MCNC: 1161 PG/ML — SIGNIFICANT CHANGE UP (ref 232–1245)
WBC # BLD: 4.66 K/UL — SIGNIFICANT CHANGE UP (ref 3.8–10.5)
WBC # FLD AUTO: 4.66 K/UL — SIGNIFICANT CHANGE UP (ref 3.8–10.5)

## 2020-02-20 PROCEDURE — 72148 MRI LUMBAR SPINE W/O DYE: CPT | Mod: 26

## 2020-02-20 PROCEDURE — 99232 SBSQ HOSP IP/OBS MODERATE 35: CPT

## 2020-02-20 PROCEDURE — 93312 ECHO TRANSESOPHAGEAL: CPT | Mod: 26

## 2020-02-20 PROCEDURE — 93325 DOPPLER ECHO COLOR FLOW MAPG: CPT | Mod: 26

## 2020-02-20 PROCEDURE — 93320 DOPPLER ECHO COMPLETE: CPT | Mod: 26

## 2020-02-20 RX ORDER — ERYTHROPOIETIN 10000 [IU]/ML
10000 INJECTION, SOLUTION INTRAVENOUS; SUBCUTANEOUS ONCE
Refills: 0 | Status: COMPLETED | OUTPATIENT
Start: 2020-02-20 | End: 2020-02-20

## 2020-02-20 RX ORDER — IRON SUCROSE 20 MG/ML
200 INJECTION, SOLUTION INTRAVENOUS EVERY 24 HOURS
Refills: 0 | Status: COMPLETED | OUTPATIENT
Start: 2020-02-20 | End: 2020-02-22

## 2020-02-20 RX ORDER — ACETAMINOPHEN 500 MG
650 TABLET ORAL EVERY 8 HOURS
Refills: 0 | Status: COMPLETED | OUTPATIENT
Start: 2020-02-20 | End: 2020-02-22

## 2020-02-20 RX ORDER — GABAPENTIN 400 MG/1
100 CAPSULE ORAL THREE TIMES A DAY
Refills: 0 | Status: DISCONTINUED | OUTPATIENT
Start: 2020-02-20 | End: 2020-02-26

## 2020-02-20 RX ORDER — MAGNESIUM SULFATE 500 MG/ML
2 VIAL (ML) INJECTION ONCE
Refills: 0 | Status: COMPLETED | OUTPATIENT
Start: 2020-02-20 | End: 2020-02-20

## 2020-02-20 RX ADMIN — IRON SUCROSE 110 MILLIGRAM(S): 20 INJECTION, SOLUTION INTRAVENOUS at 14:25

## 2020-02-20 RX ADMIN — Medication 650 MILLIGRAM(S): at 14:40

## 2020-02-20 RX ADMIN — Medication 216 GRAM(S): at 16:27

## 2020-02-20 RX ADMIN — GABAPENTIN 100 MILLIGRAM(S): 400 CAPSULE ORAL at 14:39

## 2020-02-20 RX ADMIN — Medication 216 GRAM(S): at 00:17

## 2020-02-20 RX ADMIN — OXYCODONE HYDROCHLORIDE 5 MILLIGRAM(S): 5 TABLET ORAL at 05:00

## 2020-02-20 RX ADMIN — OXYCODONE HYDROCHLORIDE 5 MILLIGRAM(S): 5 TABLET ORAL at 06:00

## 2020-02-20 RX ADMIN — Medication 50 GRAM(S): at 14:41

## 2020-02-20 RX ADMIN — ATORVASTATIN CALCIUM 20 MILLIGRAM(S): 80 TABLET, FILM COATED ORAL at 21:07

## 2020-02-20 RX ADMIN — Medication 216 GRAM(S): at 13:36

## 2020-02-20 RX ADMIN — CEFTRIAXONE 100 MILLIGRAM(S): 500 INJECTION, POWDER, FOR SOLUTION INTRAMUSCULAR; INTRAVENOUS at 05:00

## 2020-02-20 RX ADMIN — ESCITALOPRAM OXALATE 5 MILLIGRAM(S): 10 TABLET, FILM COATED ORAL at 13:37

## 2020-02-20 RX ADMIN — BICALUTAMIDE 50 MILLIGRAM(S): 50 TABLET, FILM COATED ORAL at 13:40

## 2020-02-20 RX ADMIN — Medication 216 GRAM(S): at 04:46

## 2020-02-20 RX ADMIN — Medication 650 MILLIGRAM(S): at 21:08

## 2020-02-20 RX ADMIN — TAMSULOSIN HYDROCHLORIDE 0.4 MILLIGRAM(S): 0.4 CAPSULE ORAL at 21:07

## 2020-02-20 RX ADMIN — Medication 12.5 MILLIGRAM(S): at 05:00

## 2020-02-20 RX ADMIN — CEFTRIAXONE 100 MILLIGRAM(S): 500 INJECTION, POWDER, FOR SOLUTION INTRAMUSCULAR; INTRAVENOUS at 17:26

## 2020-02-20 RX ADMIN — Medication 216 GRAM(S): at 20:32

## 2020-02-20 RX ADMIN — ENOXAPARIN SODIUM 40 MILLIGRAM(S): 100 INJECTION SUBCUTANEOUS at 13:37

## 2020-02-20 RX ADMIN — Medication 216 GRAM(S): at 08:47

## 2020-02-20 RX ADMIN — ERYTHROPOIETIN 10000 UNIT(S): 10000 INJECTION, SOLUTION INTRAVENOUS; SUBCUTANEOUS at 14:41

## 2020-02-20 RX ADMIN — GABAPENTIN 100 MILLIGRAM(S): 400 CAPSULE ORAL at 21:07

## 2020-02-20 NOTE — PROGRESS NOTE ADULT - SUBJECTIVE AND OBJECTIVE BOX
Department of Cardiology                                                                  [facility]/[place] [address] [address] [id number]                                                            Telephone: [phone]. Fax:[phone]                                                                                     Post-RONALD Note      Narrative:  84 y/o M +bacteremia, MR and MVP now s/p RONALD to rule out bacterial endocarditis  no evidence of vegetation  normal ejection fraction  severe mitral regurgitation and severe mitral valve prolapse      MEDICATIONS:  metoprolol succinate ER 12.5 milliGRAM(s) Oral daily  tamsulosin 0.4 milliGRAM(s) Oral at bedtime  ampicillin  IVPB 2 Gram(s) IV Intermittent every 4 hours  cefTRIAXone   IVPB 2000 milliGRAM(s) IV Intermittent every 12 hours  acetaminophen   Tablet .. 650 milliGRAM(s) Oral every 6 hours PRN  escitalopram 5 milliGRAM(s) Oral daily  oxyCODONE    IR 5 milliGRAM(s) Oral every 6 hours PRN  atorvastatin 20 milliGRAM(s) Oral at bedtime  bicalutamide 50 milliGRAM(s) Oral daily  enoxaparin Injectable 40 milliGRAM(s) SubCutaneous daily    PHYSICAL EXAM:  T(C): 36.6 (02-20-20 @ 07:47), Max: 36.7 (02-19-20 @ 14:50)  HR: 77 (02-20-20 @ 07:47) (77 - 87)  BP: 115/69 (02-20-20 @ 07:47) (110/63 - 138/84)  RR: 18 (02-20-20 @ 07:47) (18 - 18)  SpO2: 95% (02-20-20 @ 07:47) (94% - 98%)  Wt(kg): --    I&O's Summary    19 Feb 2020 07:01  -  20 Feb 2020 07:00  --------------------------------------------------------  IN: 250 mL / OUT: 500 mL / NET: -250 mL        Daily Height in cm: 170.18 (19 Feb 2020 21:02)    Daily     Constitutional: A & O x 3  HEENT:   Normal oral mucosa, PERRL, EOMI	  Cardiovascular: Normal S1 S2, No JVD, No murmurs  Respiratory: Lungs clear to auscultation	  Gastrointestinal:  Soft, Non-tender, + BS	  Skin: No rashes, No ecchymoses, No cyanosis  Neurologic: Non-focal  Extremities: Normal range of motion, No clubbing, cyanosis   Vascular: Peripheral pulses palpable 2+ bilaterally      DIAGNOSTIC TESTING:  [X] Echocardiogram:  < from: RONALD Echo Doppler (02.20.20 @ 10:48) >  PHYSICIAN INTERPRETATION:  Left Ventricle: The left ventricular internal cavity size is normal. Left ventricular wall thickness is normal.  Global LV systolic function was normal. Left ventricular ejection fraction, by visual estimation, is 55 to 60%. The left ventricular diastolic function could not be assessed in this study.  Right Ventricle: Normal right ventricular size and function.  Left Atrium: Severely enlarged left atrium.  Right Atrium: Mildly enlarged right atrium.  Pericardium: There is no evidence of pericardial effusion.  Mitral Valve: The mitral valve is normal in structure. Mild thickening of the anterior and posterior mitral valve leaflets. Severe mitral valve regurgitation is seen. The MR jet is eccentric anteriorly directed. There is severe posterior mitral valve prolapse with at least a partially flail posterior leaflet.  Tricuspid Valve: Structurally normal tricuspid valve, with normal leaflet excursion. Moderate tricuspid regurgitation is visualized.  Aortic Valve: The aortic valve is trileaflet. No evidence of aortic stenosis. Sclerotic aortic valve with normal opening. Trivial aortic valve regurgitation is seen.  Pulmonic Valve: The pulmonic valve is normal. Mild pulmonic valve regurgitation.  Aorta: The aortic root, ascending aorta, aortic arch and descending aorta are all structurally normal, with no evidence of dilitation or obstruction.  Venous: The pulmonary veinsappear normal.       Summary:   1. Left ventricular ejection fraction, by visual estimation, is 55 to 60%.   2. Normal global left ventricular systolic function.   3. The left ventricular diastolic function could not be assessed in this study.   4. Normal left ventricular internal cavity size.   5. Normal right ventricular size and function.   6. Severely enlarged left atrium.   7. Mildly enlarged right atrium.   8. Mild thickening of the anterior and posterior mitral valve leaflets.   9. There is severe posterior mitral valve prolapse with at least a partially flail posterior leaflet.  10. Severe eccentric mitral valve regurgitation.  11. Sclerotic aortic valve with normal opening.  12. Moderate tricuspid regurgitation.  13. There is no evidence of pericardial effusion.  14. No evidence of vegetation is seen.    ASSESSMENT: s/p RONALD    -Post RONALD orders  -refer to centricity report  -BTB when recovery time complete

## 2020-02-20 NOTE — PROGRESS NOTE ADULT - ASSESSMENT
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, Grave’s disease s/p treatment, HTN, chronic back pain likely due to old sacral fracture who was discharged from ED on 2/17 with UTI and was called back for bactremua , Id consulted , RONALD requested cardiology on board , RONALD performed no vegetations       1- UTI with bacteremia / enterococcus  infection   ID consult appreciated    RONALD endocarditis ruled out   monitor repeat blood cx for clearance   cont current abx therapy       2- cronic morrison due to retaention / h/o prostate cancer   changed today     3- cronic back pain   with old sacral fracture   cont pain meds   add neurontin   may need to get MR or CT scan   Pt ambulate   out of bed to chair     4- Severe protein fela malnutrition   add supplement and ensure to meals     5- anemia of cronic disease   will give venofer x3 days   add folic acid     6- severe MR   cardiology  follow up   no vegetation on RONALD     7- Atrial  fib   off anticoagulation due to hematuria   cont metoprolol  rate controlled

## 2020-02-20 NOTE — PROGRESS NOTE ADULT - SUBJECTIVE AND OBJECTIVE BOX
CLIFFORD DOSSE  [id number]      Chief Complaint:  Bacteremia/MVP/MR    Interval History:  Patient c/o neck pain, no other c/o.  Denies CP/SOB/palps.            acetaminophen   Tablet .. 650 milliGRAM(s) Oral every 6 hours PRN  ampicillin  IVPB 2 Gram(s) IV Intermittent every 4 hours  atorvastatin 20 milliGRAM(s) Oral at bedtime  bicalutamide 50 milliGRAM(s) Oral daily  cefTRIAXone   IVPB 2000 milliGRAM(s) IV Intermittent every 12 hours  enoxaparin Injectable 40 milliGRAM(s) SubCutaneous daily  escitalopram 5 milliGRAM(s) Oral daily  metoprolol succinate ER 12.5 milliGRAM(s) Oral daily  oxyCODONE    IR 5 milliGRAM(s) Oral every 6 hours PRN  tamsulosin 0.4 milliGRAM(s) Oral at bedtime          Physical Exam:  T(C): 36.6 (02-20-20 @ 07:47), Max: 36.7 (02-19-20 @ 14:50)  HR: 77 (02-20-20 @ 07:47) (77 - 87)  BP: 115/69 (02-20-20 @ 07:47) (110/63 - 138/84)  RR: 18 (02-20-20 @ 07:47) (18 - 18)  SpO2: 95% (02-20-20 @ 07:47) (94% - 98%)  Wt(kg): --  General: Comfortable in NAD  Neck: No JVD  CVS: nl s1s2, no s3, +apical sys murmur  Pulm: CTA b/l  Abd: soft, non-tender  Ext: No c/c/e  Neuro A&O x3  Psych: Normal affect      Labs:   20 Feb 2020 02:03    x      |  x      |  x      ----------------------------<  x      4.2     |  x      |  x        Ca    8.2        19 Feb 2020 05:51  Mg     1.6       20 Feb 2020 02:03    TPro  6.3    /  Alb  2.2    /  TBili  0.4    /  DBili  x      /  AST  17     /  ALT  6      /  AlkPhos  99     19 Feb 2020 05:51                          7.8    5.30  )-----------( 127      ( 19 Feb 2020 05:51 )             25.0             Echo:   1. Left ventricular ejection fraction, by visual estimation, is 55 to 60%.   2. Normal global left ventricular systolic function.   3. Spectral Doppler shows pseudonormal pattern of left ventricular myocardial filling (Grade II diastolic dysfunction). Elevated mean left atrial pressure.   4. Normal left ventricular internal cavity size.   5. Normal right ventricular size and function.   6. Severely enlarged left atrium.   7. Moderate thickening of the anterior and posterior mitral valve leaflets.   8. Moderate Mitral valve prolapse.   9. Severe mitral valve regurgitation.  10. Sclerotic aortic valve with normal opening.  11. Mild aortic regurgitation.  12. Moderate tricuspid regurgitation.  13. Estimated pulmonary artery systolic pressure is 77.9 mmHg assuming a right atrial pressure of 5 mmHg, which is consistent with severe pulmonary hypertension.  14. There is no evidence of pericardial effusion.          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  Cardiology called to eval for RONALD given bacteremia.  Patient with known h/o of mod MVP with mod-sev MR.  Echo shows preserved EF and normal LV size with mod MVP and severe MR.  No obvious vegetation.  Based on prior report appears grossly unchanged.  No evidence of CHF at this time.    Plan:  1. Check baseline EKG and CXR.  2. Plan RONALD today.  3. Abx and further ID w/u per ID.  4. Continue current CV meds at current doses from home.  5. Remains off A/C 2/2 hematuria as had been as OP.

## 2020-02-20 NOTE — PROGRESS NOTE ADULT - SUBJECTIVE AND OBJECTIVE BOX
CLIFFORD DOSSE  [id number]    After risks and benefits of procedures were explained informed consent was obtained and placed in chart.   RONALD was performed.  Anesthesia present to administer sedation.  Patient tolerated the procedure well without complication.      Findings:   1. Left ventricular ejection fraction, by visual estimation, is 55 to 60%.   2. Normal global left ventricular systolic function.   3. The left ventricular diastolic function could not be assessed in this study.   4. Normal left ventricular internal cavity size.   5. Normal right ventricular size and function.   6. Severely enlarged left atrium.   7. Mildly enlarged right atrium.   8. Mild thickening of the anterior and posterior mitral valve leaflets.   9. There is severe posterior mitral valve prolapse with at least a partially flail posterior leaflet.  10. Severe eccentric mitral valve regurgitation.  11. Sclerotic aortic valve with normal opening.  12. Moderate tricuspid regurgitation.  13. There is no evidence of pericardial effusion.  14. No evidence of vegetation is seen.  15. Severe pulmonary hypertension      No clear vegetation seen. Flail PMVL noted likely from progression of prior known MVP.  However in setting of 8/8 BCx positive for Enterococcus plan to treat as NVE.  D/w ID, abx per them.  Continue current CV meds at current doses.  No evidence of acute CHF now, EF normal.  D/w Dr. Quigley and when cleared of infection will consider MVR.      Pratik Price MD

## 2020-02-20 NOTE — PROGRESS NOTE ADULT - SUBJECTIVE AND OBJECTIVE BOX
[facility]  INFECTIOUS DISEASES AND INTERNAL MEDICINE at [place]  =======================================================  Lisandro Russell MD  Diplomates American Board of Internal Medicine and Infectious Diseases  Telephone [phone]  Fax            [phone]  =======================================================    [id number]  CLIFFORD DOSSE   follow up: enterococcus bacteremia    labs reviewed;   8 /8 bottles positive blood cx for enterococcus faecalis  SS to ampicillin      I have personally reviewed the labs and data; pertinent labs and data are listed in this note; please see below.     =======================================================    REVIEW OF SYSTEMS:  CONSTITUTIONAL:  FATIGUE    HEENT:  No diplopia or blurred vision.  No earache, sore throat or runny nose.  CARDIOVASCULAR:  No pressure, squeezing, strangling, tightness, heaviness or aching about the chest, neck, axilla or epigastrium.  RESPIRATORY:  No cough, shortness of breath  GASTROINTESTINAL:  No nausea, vomiting or diarrhea.  GENITOURINARY:  No dysuria, frequency or urgency. No Blood in urine  MUSCULOSKELETAL:  BACK PAIN  SKIN:  No change in skin, hair or nails.  NEUROLOGIC:  No Headaches, seizures or weakness.  PSYCHIATRIC:  No disorder of thought or mood.  ENDOCRINE:  No heat or cold intolerance  HEMATOLOGICAL:  No easy bruising or bleeding.   =======================================================  Allergies  iodine (Swelling)        ======================================================  Physical Exam:  ============  (see vitals section below)    General:  No acute distress.  FRAIL  Eye: Pupils are equal, round and reactive to light, Extraocular movements are intact, Normal conjunctiva.  HENT: Normocephalic, Oral mucosa is moist, No pharyngeal erythema, No sinus tenderness.  CONJUNCTIVAL PALLOR, NO HEMORRHAGES.   POOR DENTITION, WITH MISSING TEETH, GINGIVAL INFLAMMATION  Neck: Supple, No lymphadenopathy.  Respiratory: Lungs WITH FAIR AIR ENTRY  Cardiovascular: Normal rate, Regular rhythm,  HARSH SYSTOLIC MURMUR  Gastrointestinal: Soft, Non-tender, Non-distended, Normal bowel sounds.  Genitourinary: No costovertebral angle tenderness.  MINER WITH CLEAR URINE  Lymphatics: No lymphadenopathy neck,   Musculoskeletal: Normal range of motion, Normal strength.  Integumentary: No rash.  Neurologic: Alert, Oriented, No focal deficits, Cranial Nerves II-XII are grossly intact.  Psychiatric: Appropriate mood & affect.    =======================================================    Vitals:  ============  T(F): 97.8 (20 Feb 2020 07:47), Max: 98 (19 Feb 2020 14:50)  HR: 77 (20 Feb 2020 07:47)  BP: 115/69 (20 Feb 2020 07:47)  RR: 18 (20 Feb 2020 07:47)  SpO2: 95% (20 Feb 2020 07:47) (94% - 98%)  temp max in last 48H T(F): , Max: 98.6 (02-19-20 @ 04:27)    =======================================================  Current Antibiotics:  ampicillin  IVPB 2 Gram(s) IV Intermittent every 4 hours  cefTRIAXone   IVPB 2000 milliGRAM(s) IV Intermittent every 12 hours    Other medications:  atorvastatin 20 milliGRAM(s) Oral at bedtime  bicalutamide 50 milliGRAM(s) Oral daily  enoxaparin Injectable 40 milliGRAM(s) SubCutaneous daily  escitalopram 5 milliGRAM(s) Oral daily  metoprolol succinate ER 12.5 milliGRAM(s) Oral daily  tamsulosin 0.4 milliGRAM(s) Oral at bedtime      =======================================================  Labs:                        7.8    5.30  )-----------( 127      ( 19 Feb 2020 05:51 )             25.0       WBC Count: 5.30 K/uL (02-19-20 @ 05:51)  WBC Count: 5.22 K/uL (02-18-20 @ 19:28)  WBC Count: 5.96 K/uL (02-17-20 @ 18:32)      02-20    x   |  x   |  x   ----------------------------<  x   4.2   |  x   |  x     Ca    8.2<L>      19 Feb 2020 05:51  Mg     1.6     02-20    TPro  6.3<L>  /  Alb  2.2<L>  /  TBili  0.4  /  DBili  x   /  AST  17  /  ALT  6   /  AlkPhos  99  02-19      Culture - Urine (collected 02-19-20 @ 01:35)  Source: .Urine  Final Report (02-19-20 @ 21:52):    <10,000 CFU/mL Normal Urogenital Mandie    Culture - Blood (collected 02-18-20 @ 18:39)  Source: .Blood    Culture - Blood (collected 02-18-20 @ 18:39)  Source: .Blood    Culture - Blood (collected 02-18-20 @ 00:50)  Source: .Blood  Final Report (02-19-20 @ 13:34):    Growth in aerobic and anaerobic bottles: Enterococcus faecalis    Anaerobic Bottle: 12:04 Hours to positivity    Aerobic Bottle: 13:08 Hours to positivity    ***Blood Panel PCR results on this specimen are available    approximately 3 hours after the Gram stain result.***    Gram stain, PCR, and/or culture results may not always    correspond due to difference in methodologies.    ************************************************************    This PCR assay was performed using [employer].    The following targets are tested for: Enterococcus,    vancomycin resistant enterococci, Listeria monocytogenes,    coagulase negative staphylococci, S. aureus,    methicillin resistant S. aureus, Streptococcus agalactiae    (Group B), S. pneumoniae, S. pyogenes (Group A),    Acinetobacter baumannii, Enterobacter cloacae, E. coli,    Klebsiella oxytoca, K. pneumoniae, Proteus sp.,    Serratia marcescens, Haemophilus influenzae,    Neisseria meningitidis, Pseudomonas aeruginosa, Candida    albicans, C. glabrata, C krusei, C parapsilosis,    C. tropicalis and the KPC resistance gene.    .    TYPE: (C=Critical, N=Notification, A=Abnormal)    C    TESTS:  _ bld     DATE/TIME CALLED: _ 02/18/2020 14:57:24    CALLED TO: Venus KINCAID    READBACK (2 Patient Identifiers)(Y/N): _ Y    READ BACK VALUES (Y/N): _ Y    CALLED BY: _ katyin  Organism: Blood Culture PCR  Enterococcus faecalis (02-19-20 @ 13:34)  Organism: Enterococcus faecalis (02-19-20 @ 13:34)    Sensitivities:      -  Ampicillin: S <=2 Predicts results to ampicillin/sulbactam, amoxacillin-clavulanate and  piperacillin-tazobactam.      -  Gentamicin synergy: S      -  Streptomycin synergy: S      -  Vancomycin: S 1      Method Type: STEPHANIE  Organism: Blood Culture PCR (02-19-20 @ 13:34)    Sensitivities:      -  Enterococcus species: Detec      Method Type: PCR    Culture - Blood (collected 02-18-20 @ 00:49)  Source: .Blood  Final Report (02-19-20 @ 13:36):    Growth in aerobic and anaerobic bottles: Enterococcus faecalis    Anaerobic Bottle: 12:04 Hours to positivity    Aerobic Bottle: 13:18 Hours to positivity    .    TYPE: (C=Critical, N=Notification, A=Abnormal) C    TESTS:  _ Bld gs    DATE/TIME CALLED: _ 02/18/2020 15:16:55    CALLED TO: Venus KINCAID    READ BACK (2 Patient Identifiers)(Y/N): _ Y    READ BACK VALUES (Y/N): _ Y    CALLED BY: Venus salazarin  Organism: Enterococcus faecalis (02-19-20 @ 13:36)  Organism: Enterococcus faecalis (02-19-20 @ 13:36)    Sensitivities:      -  Ampicillin: S <=2 Predicts results to ampicillin/sulbactam, amoxacillin-clavulanate and  piperacillin-tazobactam.      -  Gentamicin synergy: S      -  Streptomycin synergy: S      -  Vancomycin: S 1      Method Type: STEPHANIE    Culture - Urine (collected 02-17-20 @ 21:05)  Source: .Urine  Final Report (02-19-20 @ 22:11):    50,000 - 99,000 CFU/mL Enterococcus faecalis    10,000 - 49,000 CFU/mL Coag Negative Staphylococcus "Susceptibilities not    performed"  Organism: Enterococcus faecalis (02-19-20 @ 22:11)  Organism: Enterococcus faecalis (02-19-20 @ 22:11)    Sensitivities:      -  Ampicillin: S <=2 Predicts results to ampicillin/sulbactam, amoxacillin-clavulanate and  piperacillin-tazobactam.      -  Ciprofloxacin: S <=1      -  Levofloxacin: S <=1      -  Nitrofurantoin: S <=32 Should not be used to treat pyelonephritis.      -  Tetra/Doxy: R >8      -  Vancomycin: S 1      Method Type: STEPHANIE      Creatinine, Serum: 0.63 mg/dL (02-19-20 @ 05:51)  Creatinine, Serum: 0.82 mg/dL (02-18-20 @ 18:31)  Creatinine, Serum: 0.84 mg/dL (02-17-20 @ 18:32)    C-Reactive Protein, Serum: 14.45 mg/dL (02-19-20 @ 05:51)  Sedimentation Rate, Erythrocyte: 55 mm/hr (02-19-20 @ 05:51)

## 2020-02-20 NOTE — PROGRESS NOTE ADULT - ASSESSMENT
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     Impression:  High grade bacteremia  Enterococcus species bacteremia  Mitral regurgitation  lumbar pain  fevers at home      Plan:  - concern raised for endocarditis; PENDING RONALD  - should obtain RONALD to better assess valve, in view of mitral regurg per pt hx.   - daily blood cx x 3 more days - I HAVE ORDERED THIS  CONTINUE Antibiotics:  ampicillin  IVPB 2 Gram(s) IV Intermittent every 4 hours  cefTRIAXone   IVPB 2000 milliGRAM(s) IV Intermittent every 12 hours    - ESR and CRP  are BOTH ELEVATED    regarding back pain.   - this should be investigated with MRI to r/o infectious focus.     - follow up all outstanding cultures  - trend temperature and WBC curve  - repeat cultures from blood and all sources if febrile.

## 2020-02-20 NOTE — PROGRESS NOTE ADULT - SUBJECTIVE AND OBJECTIVE BOX
Internal Medicine Hospitalist Progress Note    seen in am , wife was at the bedside   he uis comfortable , still with c/o pain in the lower back which is cronic   no overnight events reported     Vital Signs Last 24 Hrs  T(C): 36.6 (20 Feb 2020 07:47), Max: 36.7 (19 Feb 2020 14:50)  T(F): 97.8 (20 Feb 2020 07:47), Max: 98 (19 Feb 2020 14:50)  HR: 77 (20 Feb 2020 07:47) (77 - 87)  BP: 115/69 (20 Feb 2020 07:47) (110/63 - 138/84)  BP(mean): --  RR: 18 (20 Feb 2020 07:47) (18 - 18)  SpO2: 95% (20 Feb 2020 07:47) (94% - 98%)              Constitutional: cachectic    Neck: supple , no JVD     Respiratory: CTA bilateral     Cardiovascular: regular s1 /s2 , systolic severe murmur at apex     Gastrointestinal: soft no tenderness , BS positive     Extremities: no edema      morrisno in place clear urine     Culture - Blood (02.18.20 @ 00:50)    -  Gentamicin synergy: S    -  Streptomycin synergy: S    -  Enterococcus species: Detec    -  Vancomycin: S 1    -  Ampicillin: S <=2 Predicts results to ampicillin/sulbactam, amoxacillin-clavulanate and  piperacillin-tazobactam.    Specimen Source: .Blood    Organism: Blood Culture PCR    Organism: Enterococcus faecalis    Culture Results:   Growth in aerobic and anaerobic bottles: Enterococcus faecalis  Anaerobic Bottle: 12:04 Hours to positivity  Aerobic Bottle: 13:08 Hours to positivity  ***Blood Panel PCR results on this specimen are available  approximately 3 hours after the Gram stain result.***  Gram stain, PCR, and/or culture results may not always  correspond due to difference in methodologies.  ************************************************************  This PCR assay was performed using [employer].  The following targets are tested for: Enterococcus,  vancomycin resistant enterococci, Listeria monocytogenes,  coagulase negative staphylococci, S. aureus,  methicillin resistant S. aureus, Streptococcus agalactiae  (Group B), S. pneumoniae, S. pyogenes (Group A),  Acinetobacter baumannii, Enterobacter cloacae, E. coli,  Klebsiella oxytoca, K. pneumoniae, Proteus sp.,  Serratia marcescens, Haemophilus influenzae,  Neisseria meningitidis, Pseudomonas aeruginosa, Candida  albicans, C. glabrata, C krusei, C parapsilosis,  C. tropicalis and the KPC resistance gene.  .  TYPE: (C=Critical, N=Notification, A=Abnormal)                            8.2    4.66  )-----------( 153      ( 20 Feb 2020 10:43 )             26.4   02-20    135  |  98  |  11.0  ----------------------------<  90  4.1   |  26.0  |  0.63    Ca    8.7      20 Feb 2020 10:42  Mg     1.6     02-20    TPro  6.3<L>  /  Alb  2.2<L>  /  TBili  0.4  /  DBili  x   /  AST  17  /  ALT  6   /  AlkPhos  99  02-19                      TESTS:  _ bld   DATE/TIME CALLED: _ 02/18/2020 14:57:24  CALLED TO: _ Hussain KINCAID  READBACK (2 Patient Identifiers)(Y/N): _ Y  READ BACK VALUES (Y/N): _ Y  CALLED BY: _ mike    Organism Identification: Blood Culture PCR  Enterococcus faecalis    Method Type: PCR    Method Type: STEPHANIE            LABS:                        7.8    5.30  )-----------( 127      ( 19 Feb 2020 05:51 )             25.0     02-19    132<L>  |  95<L>  |  10.0  ----------------------------<  131<H>  3.3<L>   |  25.0  |  0.63    Ca    8.2<L>      19 Feb 2020 05:51    TPro  6.3<L>  /  Alb  2.2<L>  /  TBili  0.4  /  DBili  x   /  AST  17  /  ALT  6   /  AlkPhos  99  02-19    Culture - Urine (02.17.20 @ 21:05)    Specimen Source: .Urine    Culture Results:   50,000 - 99,000 CFU/mL Gram positive organisms  10,000 - 49,000 CFU/mL Coag Negative Staphylococcus "Susceptibilities not  performed"        Radiology :

## 2020-02-21 PROBLEM — C44.91 BASAL CELL CARCINOMA OF SKIN, UNSPECIFIED: Chronic | Status: ACTIVE | Noted: 2020-02-18

## 2020-02-21 LAB
-  AMPICILLIN: SIGNIFICANT CHANGE UP
-  AMPICILLIN: SIGNIFICANT CHANGE UP
-  GENTAMICIN SYNERGY: SIGNIFICANT CHANGE UP
-  GENTAMICIN SYNERGY: SIGNIFICANT CHANGE UP
-  STREPTOMYCIN SYNERGY: SIGNIFICANT CHANGE UP
-  STREPTOMYCIN SYNERGY: SIGNIFICANT CHANGE UP
-  VANCOMYCIN: SIGNIFICANT CHANGE UP
-  VANCOMYCIN: SIGNIFICANT CHANGE UP
CULTURE RESULTS: SIGNIFICANT CHANGE UP
CULTURE RESULTS: SIGNIFICANT CHANGE UP
METHOD TYPE: SIGNIFICANT CHANGE UP
METHOD TYPE: SIGNIFICANT CHANGE UP
ORGANISM # SPEC MICROSCOPIC CNT: SIGNIFICANT CHANGE UP
SPECIMEN SOURCE: SIGNIFICANT CHANGE UP
SPECIMEN SOURCE: SIGNIFICANT CHANGE UP

## 2020-02-21 PROCEDURE — 99232 SBSQ HOSP IP/OBS MODERATE 35: CPT

## 2020-02-21 PROCEDURE — 99233 SBSQ HOSP IP/OBS HIGH 50: CPT

## 2020-02-21 RX ORDER — DIPHENHYDRAMINE HCL 50 MG
50 CAPSULE ORAL ONCE
Refills: 0 | Status: DISCONTINUED | OUTPATIENT
Start: 2020-02-21 | End: 2020-02-24

## 2020-02-21 RX ADMIN — Medication 216 GRAM(S): at 20:39

## 2020-02-21 RX ADMIN — BICALUTAMIDE 50 MILLIGRAM(S): 50 TABLET, FILM COATED ORAL at 14:16

## 2020-02-21 RX ADMIN — Medication 125 MILLIGRAM(S): at 20:37

## 2020-02-21 RX ADMIN — CEFTRIAXONE 100 MILLIGRAM(S): 500 INJECTION, POWDER, FOR SOLUTION INTRAMUSCULAR; INTRAVENOUS at 06:04

## 2020-02-21 RX ADMIN — ENOXAPARIN SODIUM 40 MILLIGRAM(S): 100 INJECTION SUBCUTANEOUS at 14:16

## 2020-02-21 RX ADMIN — ATORVASTATIN CALCIUM 20 MILLIGRAM(S): 80 TABLET, FILM COATED ORAL at 22:51

## 2020-02-21 RX ADMIN — GABAPENTIN 100 MILLIGRAM(S): 400 CAPSULE ORAL at 06:01

## 2020-02-21 RX ADMIN — Medication 650 MILLIGRAM(S): at 06:01

## 2020-02-21 RX ADMIN — Medication 650 MILLIGRAM(S): at 14:52

## 2020-02-21 RX ADMIN — IRON SUCROSE 110 MILLIGRAM(S): 20 INJECTION, SOLUTION INTRAVENOUS at 15:46

## 2020-02-21 RX ADMIN — TAMSULOSIN HYDROCHLORIDE 0.4 MILLIGRAM(S): 0.4 CAPSULE ORAL at 22:50

## 2020-02-21 RX ADMIN — Medication 12.5 MILLIGRAM(S): at 06:01

## 2020-02-21 RX ADMIN — Medication 216 GRAM(S): at 00:24

## 2020-02-21 RX ADMIN — Medication 650 MILLIGRAM(S): at 22:51

## 2020-02-21 RX ADMIN — Medication 216 GRAM(S): at 08:58

## 2020-02-21 RX ADMIN — Medication 650 MILLIGRAM(S): at 14:17

## 2020-02-21 RX ADMIN — ESCITALOPRAM OXALATE 5 MILLIGRAM(S): 10 TABLET, FILM COATED ORAL at 14:16

## 2020-02-21 RX ADMIN — GABAPENTIN 100 MILLIGRAM(S): 400 CAPSULE ORAL at 14:20

## 2020-02-21 RX ADMIN — CEFTRIAXONE 100 MILLIGRAM(S): 500 INJECTION, POWDER, FOR SOLUTION INTRAMUSCULAR; INTRAVENOUS at 18:31

## 2020-02-21 RX ADMIN — Medication 216 GRAM(S): at 12:19

## 2020-02-21 RX ADMIN — Medication 216 GRAM(S): at 04:26

## 2020-02-21 RX ADMIN — Medication 216 GRAM(S): at 17:22

## 2020-02-21 RX ADMIN — GABAPENTIN 100 MILLIGRAM(S): 400 CAPSULE ORAL at 22:51

## 2020-02-21 NOTE — PROGRESS NOTE ADULT - SUBJECTIVE AND OBJECTIVE BOX
Internal Medicine Hospitalist Progress Note    seen in the morning  , wife was at the bedside   spoke  the daughter over the phone as well in details   pt is out of bed in the chair     Vital Signs Last 24 Hrs  T(C): 36.8 (21 Feb 2020 15:41), Max: 36.8 (21 Feb 2020 15:41)  T(F): 98.3 (21 Feb 2020 15:41), Max: 98.3 (21 Feb 2020 15:41)  HR: 76 (21 Feb 2020 15:41) (70 - 79)  BP: 120/78 (21 Feb 2020 15:41) (96/52 - 132/75)  BP(mean): --  RR: 18 (21 Feb 2020 15:41) (18 - 18)  SpO2: 98% (21 Feb 2020 15:41) (95% - 98%)        Constitutional: cachectic ,ill looking     Neck: supple , no JVD     Respiratory: CTA bilateral , no rales     Cardiovascular: regular s1 /s2 , systolic severe murmur at apex     Gastrointestinal: soft no tenderness , BS positive     Extremities: no edema      morrison in place clear urine                             8.2    4.66  )-----------( 153      ( 20 Feb 2020 10:43 )             26.4   02-20    135  |  98  |  11.0  ----------------------------<  90  4.1   |  26.0  |  0.63    Ca    8.7      20 Feb 2020 10:42  Mg     1.6     02-20      Culture - Blood (02.18.20 @ 00:50)    -  Gentamicin synergy: S    -  Streptomycin synergy: S    -  Enterococcus species: Detec    -  Vancomycin: S 1    -  Ampicillin: S <=2 Predicts results to ampicillin/sulbactam, amoxacillin-clavulanate and  piperacillin-tazobactam.    Specimen Source: .Blood    Organism: Blood Culture PCR    Organism: Enterococcus faecalis    Culture Results:   Growth in aerobic and anaerobic bottles: Enterococcus faecalis  Anaerobic Bottle: 12:04 Hours to positivity  Aerobic Bottle: 13:08 Hours to positivity  ***Blood Panel PCR results on this specimen are available  approximately 3 hours after the Gram stain result.***  Gram stain, PCR, and/or culture results may not always  correspond due to difference in methodologies.  ************************************************************  This PCR assay was performed using [employer].  The following targets are tested for: Enterococcus,  vancomycin resistant enterococci, Listeria monocytogenes,  coagulase negative staphylococci, S. aureus,  methicillin resistant S. aureus, Streptococcus agalactiae  (Group B), S. pneumoniae, S. pyogenes (Group A),  Acinetobacter baumannii, Enterobacter cloacae, E. coli,  Klebsiella oxytoca, K. pneumoniae, Proteus sp.,  Serratia marcescens, Haemophilus influenzae,  Neisseria meningitidis, Pseudomonas aeruginosa, Candida  albicans, C. glabrata, C krusei, C parapsilosis,  C. tropicalis and the KPC resistance gene.  .  TYPE: (C=Critical, N=Notification, A=Abnormal)        Radiology :

## 2020-02-21 NOTE — DIETITIAN INITIAL EVALUATION ADULT. - PERTINENT LABORATORY DATA
02-20 Na135 mmol/L Glu 90 mg/dL K+ 4.1 mmol/L Cr  0.63 mg/dL BUN 11.0 mg/dL Phos n/a   Alb n/a   PAB n/a

## 2020-02-21 NOTE — DIETITIAN INITIAL EVALUATION ADULT. - ETIOLOGY
related to inability to meet sufficient protein-energy in setting of prostate cancer, depression, advanced age, and persistent lack of appetite

## 2020-02-21 NOTE — DIETITIAN INITIAL EVALUATION ADULT. - PERTINENT MEDS FT
MEDICATIONS  (STANDING):  acetaminophen   Tablet .. 650 milliGRAM(s) Oral every 8 hours  ampicillin  IVPB 2 Gram(s) IV Intermittent every 4 hours  atorvastatin 20 milliGRAM(s) Oral at bedtime  bicalutamide 50 milliGRAM(s) Oral daily  cefTRIAXone   IVPB 2000 milliGRAM(s) IV Intermittent every 12 hours  enoxaparin Injectable 40 milliGRAM(s) SubCutaneous daily  escitalopram 5 milliGRAM(s) Oral daily  gabapentin 100 milliGRAM(s) Oral three times a day  iron sucrose IVPB 200 milliGRAM(s) IV Intermittent every 24 hours  metoprolol succinate ER 12.5 milliGRAM(s) Oral daily  tamsulosin 0.4 milliGRAM(s) Oral at bedtime    MEDICATIONS  (PRN):  acetaminophen   Tablet .. 650 milliGRAM(s) Oral every 6 hours PRN Temp greater or equal to 38C (100.4F), Mild Pain (1 - 3), Moderate Pain (4 - 6)  oxyCODONE    IR 5 milliGRAM(s) Oral every 6 hours PRN Severe Pain (7 - 10)

## 2020-02-21 NOTE — DIETITIAN INITIAL EVALUATION ADULT. - OTHER INFO
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 Spoke with pt and pts wife at bedside. Reports prolonged poor appetite/po intake. States PTA, would typically eat a good breakfast but lunch and dinner were "forced". Wife states she encourages pt to drink Ensure supplements at home to optimize intake. Reports 6 kg (13.2 lb wt loss) over the last few months. Pt reports over the last week, appetite/po intake has declined further. Receptive to receive Ensure Enlive to optimize po intake.

## 2020-02-21 NOTE — CHART NOTE - NSCHARTNOTEFT_GEN_A_CORE
-patient and family expressed their concern w/  undergoing CT w/ PO and IV contrast  - patient does not want to undergo due to allergy concern despite giving the medication for protocol. -patient and family expressed their concern w/  undergoing CT w/ PO and IV contrast  - patient does not want to undergo due to allergy concern despite giving the medication for protocol.  - patient states in the past has experienced thyrotoxicoses s/p IV and oral contrast.

## 2020-02-21 NOTE — PHYSICAL THERAPY INITIAL EVALUATION ADULT - DIAGNOSIS, PT EVAL
Decreased mobility/function; generalized weakness, deconditioned; h/o CVA; Decreased mobility/function; generalized weakness, deconditioned; h/o CVA; right sided weakness

## 2020-02-21 NOTE — DIETITIAN INITIAL EVALUATION ADULT. - ADD RECOMMEND
Encourage small, frequent meals and to make protein a priority. Obtain daily weights to monitor trends.

## 2020-02-21 NOTE — PHYSICAL THERAPY INITIAL EVALUATION ADULT - ADDITIONAL COMMENTS
Pt. wife states that they live in a private home with 5 steps to enter with handrail.  Ambulates with RW and wife home to assist.

## 2020-02-21 NOTE — PROGRESS NOTE ADULT - SUBJECTIVE AND OBJECTIVE BOX
[facility]  INFECTIOUS DISEASES AND INTERNAL MEDICINE at [place]  =======================================================  Lisandro Russell MD  Diplomates American Board of Internal Medicine and Infectious Diseases  Telephone [phone]  Fax            [phone]  =======================================================    [id number]  CLIFFORD DOSSE   follow up: enterococcus bacteremia    labs reviewed;   8 /8 bottles positive blood cx for enterococcus faecalis  SS to ampicillin  repeat cultures in process.   NO obvious vegetation on RONALD, flail mitral leaflet     I have personally reviewed the labs and data; pertinent labs and data are listed in this note; please see below.     =======================================================    REVIEW OF SYSTEMS:  CONSTITUTIONAL:  FATIGUE    HEENT:  No diplopia or blurred vision.  No earache, sore throat or runny nose.  CARDIOVASCULAR:  No pressure, squeezing, strangling, tightness, heaviness or aching about the chest, neck, axilla or epigastrium.  RESPIRATORY:  No cough, shortness of breath  GASTROINTESTINAL:  No nausea, vomiting or diarrhea.  GENITOURINARY:  No dysuria, frequency or urgency. No Blood in urine  MUSCULOSKELETAL:  BACK PAIN  SKIN:  No change in skin, hair or nails.  NEUROLOGIC:  No Headaches, seizures or weakness.  PSYCHIATRIC:  No disorder of thought or mood.  ENDOCRINE:  No heat or cold intolerance  HEMATOLOGICAL:  No easy bruising or bleeding.   =======================================================  Allergies  iodine (Swelling)        ======================================================  Physical Exam:  ============  (see vitals section below)    General:  No acute distress.  FRAIL  Eye: Pupils are equal, round and reactive to light, Extraocular movements are intact, Normal conjunctiva.  HENT: Normocephalic, Oral mucosa is moist, No pharyngeal erythema, No sinus tenderness.  CONJUNCTIVAL PALLOR, NO HEMORRHAGES.   POOR DENTITION, WITH MISSING TEETH, GINGIVAL INFLAMMATION  Neck: Supple, No lymphadenopathy.  Respiratory: Lungs WITH FAIR AIR ENTRY  Cardiovascular: Normal rate, Regular rhythm,  HARSH SYSTOLIC MURMUR  Gastrointestinal: Soft, Non-tender, Non-distended, Normal bowel sounds.  Genitourinary: No costovertebral angle tenderness.  MINER WITH CLEAR URINE  Lymphatics: No lymphadenopathy neck,   Musculoskeletal: Normal range of motion, Normal strength.  Integumentary: No rash.  Neurologic: Alert, Oriented, No focal deficits, Cranial Nerves II-XII are grossly intact.  Psychiatric: Appropriate mood & affect.    =======================================================  Vitals:  ============  T(F): 97.5 (20 Feb 2020 23:31), Max: 97.5 (20 Feb 2020 23:31)  HR: 79 (21 Feb 2020 05:58)  BP: 132/75 (21 Feb 2020 05:58)  RR: 18 (20 Feb 2020 23:31)  SpO2: 97% (20 Feb 2020 23:31) (93% - 97%)  temp max in last 48H T(F): , Max: 98.4 (02-19-20 @ 08:35)    =======================================================  Current Antibiotics:  ampicillin  IVPB 2 Gram(s) IV Intermittent every 4 hours  cefTRIAXone   IVPB 2000 milliGRAM(s) IV Intermittent every 12 hours    Other medications:  acetaminophen   Tablet .. 650 milliGRAM(s) Oral every 8 hours  atorvastatin 20 milliGRAM(s) Oral at bedtime  bicalutamide 50 milliGRAM(s) Oral daily  enoxaparin Injectable 40 milliGRAM(s) SubCutaneous daily  escitalopram 5 milliGRAM(s) Oral daily  gabapentin 100 milliGRAM(s) Oral three times a day  iron sucrose IVPB 200 milliGRAM(s) IV Intermittent every 24 hours  metoprolol succinate ER 12.5 milliGRAM(s) Oral daily  tamsulosin 0.4 milliGRAM(s) Oral at bedtime      =======================================================  Labs:                        8.2    4.66  )-----------( 153      ( 20 Feb 2020 10:43 )             26.4       WBC Count: 4.66 K/uL (02-20-20 @ 10:43)  WBC Count: 5.30 K/uL (02-19-20 @ 05:51)  WBC Count: 5.22 K/uL (02-18-20 @ 19:28)  WBC Count: 5.96 K/uL (02-17-20 @ 18:32)      02-20    135  |  98  |  11.0  ----------------------------<  90  4.1   |  26.0  |  0.63    Ca    8.7      20 Feb 2020 10:42  Mg     1.6     02-20        Culture - Blood (collected 02-19-20 @ 14:22)  Source: .Blood    Culture - Blood (collected 02-19-20 @ 14:22)  Source: .Blood    Culture - Urine (collected 02-19-20 @ 01:35)  Source: .Urine  Final Report (02-19-20 @ 21:52):    <10,000 CFU/mL Normal Urogenital Mandie    Culture - Blood (collected 02-18-20 @ 18:39)  Source: .Blood  Final Report (02-20-20 @ 11:27):    Growth in aerobic and anaerobic bottles: Enterococcus faecalis    Aerobic Bottle: 12:18 Hours to positivity    Anaerobic Bottle: 12:28 Hours to positivity    .    TYPE: (C=Critical, N=Notification, A=Abnormal) C    TESTS:  _ Positive Blood GS    DATE/TIME CALLED: _ 02/19/2020 10:10    CALLED TO: _ ERHR: Marian Schulte RN    READ BACK (2 Patient Identifiers)(Y/N): _ Y    READ BACK VALUES (Y/N): _ Y    CALLED BY: Venus vincent  Organism: Enterococcus faecalis (02-20-20 @ 11:27)  Organism: Enterococcus faecalis (02-20-20 @ 11:27)    Sensitivities:      -  Ampicillin: S <=2 Predicts results to ampicillin/sulbactam, amoxacillin-clavulanate and  piperacillin-tazobactam.      -  Gentamicin synergy: S      -  Streptomycin synergy: S      -  Vancomycin: S 2      Method Type: STEPHANIE    Culture - Blood (collected 02-18-20 @ 18:39)  Source: .Blood  Final Report (02-20-20 @ 11:28):    Growth in aerobic and anaerobic bottles: Enterococcus faecalis    Aerobic Bottle: 12:08 Hours to positivity    Anaerobic Bottle: 12:28 Hours to positivity    .    TYPE: (C=Critical, N=Notification, A=Abnormal) C    TESTS:  _ Positive Blood GS    DATE/TIME CALLED: _ 02/19/2020 10:10    CALLED TO: _ ERHR: Marian Schulte RN    READ BACK (2 Patient Identifiers)(Y/N): _ Y    READ BACK VALUES (Y/N): _ Y    CALLED BY: Venus vincent  Organism: Enterococcus faecalis (02-20-20 @ 11:28)  Organism: Enterococcus faecalis (02-20-20 @ 11:28)    Sensitivities:      -  Ampicillin: S <=2 Predicts results to ampicillin/sulbactam, amoxacillin-clavulanate and  piperacillin-tazobactam.      -  Gentamicin synergy: S      -  Streptomycin synergy: S      -  Vancomycin: S 2      Method Type: STEPHANIE    Culture - Blood (collected 02-18-20 @ 00:50)  Source: .Blood  Final Report (02-19-20 @ 13:34):    Growth in aerobic and anaerobic bottles: Enterococcus faecalis    Anaerobic Bottle: 12:04 Hours to positivity    Aerobic Bottle: 13:08 Hours to positivity    ***Blood Panel PCR results on this specimen are available    approximately 3 hours after the Gram stain result.***    Gram stain, PCR, and/or culture results may not always    correspond due to difference in methodologies.    ************************************************************    This PCR assay was performed using [employer].    The following targets are tested for: Enterococcus,    vancomycin resistant enterococci, Listeria monocytogenes,    coagulase negative staphylococci, S. aureus,    methicillin resistant S. aureus, Streptococcus agalactiae    (Group B), S. pneumoniae, S. pyogenes (Group A),    Acinetobacter baumannii, Enterobacter cloacae, E. coli,    Klebsiella oxytoca, K. pneumoniae, Proteus sp.,    Serratia marcescens, Haemophilus influenzae,    Neisseria meningitidis, Pseudomonas aeruginosa, Candida    albicans, C. glabrata, C krusei, C parapsilosis,    C. tropicalis and the KPC resistance gene.    .    TYPE: (C=Critical, N=Notification, A=Abnormal)    C    TESTS:  _ bld     DATE/TIME CALLED: _ 02/18/2020 14:57:24    CALLED TO: Venus KINCAID    READBACK (2 Patient Identifiers)(Y/N): _ Y    READ BACK VALUES (Y/N): _ Y    CALLED BY: _ katyin  Organism: Blood Culture PCR  Enterococcus faecalis (02-19-20 @ 13:34)  Organism: Enterococcus faecalis (02-19-20 @ 13:34)    Sensitivities:      -  Ampicillin: S <=2 Predicts results to ampicillin/sulbactam, amoxacillin-clavulanate and  piperacillin-tazobactam.      -  Gentamicin synergy: S      -  Streptomycin synergy: S      -  Vancomycin: S 1      Method Type: STEPHANIE  Organism: Blood Culture PCR (02-19-20 @ 13:34)    Sensitivities:      -  Enterococcus species: Detec      Method Type: PCR    Culture - Blood (collected 02-18-20 @ 00:49)  Source: .Blood  Final Report (02-19-20 @ 13:36):    Growth in aerobic and anaerobic bottles: Enterococcus faecalis    Anaerobic Bottle: 12:04 Hours to positivity    Aerobic Bottle: 13:18 Hours to positivity    .    TYPE: (C=Critical, N=Notification, A=Abnormal) C    TESTS:  _ Bld gs    DATE/TIME CALLED: _ 02/18/2020 15:16:55    CALLED TO: Venus KINCAID    READ BACK (2 Patient Identifiers)(Y/N): _ Y    READ BACK VALUES (Y/N): _ Y    CALLED BY: Venus salazarin  Organism: Enterococcus faecalis (02-19-20 @ 13:36)  Organism: Enterococcus faecalis (02-19-20 @ 13:36)    Sensitivities:      -  Ampicillin: S <=2 Predicts results to ampicillin/sulbactam, amoxacillin-clavulanate and  piperacillin-tazobactam.      -  Gentamicin synergy: S      -  Streptomycin synergy: S      -  Vancomycin: S 1      Method Type: STEPHANIE    Culture - Urine (collected 02-17-20 @ 21:05)  Source: .Urine  Final Report (02-19-20 @ 22:11):    50,000 - 99,000 CFU/mL Enterococcus faecalis    10,000 - 49,000 CFU/mL Coag Negative Staphylococcus "Susceptibilities not    performed"  Organism: Enterococcus faecalis (02-19-20 @ 22:11)  Organism: Enterococcus faecalis (02-19-20 @ 22:11)    Sensitivities:      -  Ampicillin: S <=2 Predicts results to ampicillin/sulbactam, amoxacillin-clavulanate and  piperacillin-tazobactam.      -  Ciprofloxacin: S <=1      -  Levofloxacin: S <=1      -  Nitrofurantoin: S <=32 Should not be used to treat pyelonephritis.      -  Tetra/Doxy: R >8      -  Vancomycin: S 1      Method Type: STEPHANIE      Creatinine, Serum: 0.63 mg/dL (02-20-20 @ 10:42)  Creatinine, Serum: 0.63 mg/dL (02-19-20 @ 05:51)  Creatinine, Serum: 0.82 mg/dL (02-18-20 @ 18:31)  Creatinine, Serum: 0.84 mg/dL (02-17-20 @ 18:32)    C-Reactive Protein, Serum: 14.45 mg/dL (02-19-20 @ 05:51)    Sedimentation Rate, Erythrocyte: 55 mm/hr (02-19-20 @ 05:51)

## 2020-02-21 NOTE — PROGRESS NOTE ADULT - ASSESSMENT
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     Impression:  High grade bacteremia  Enterococcus species bacteremia  Mitral regurgitation  lumbar pain  fevers at home      Plan:  repeat cultures in process.   NO obvious vegetation on RONALD, flail mitral leaflet  with severe mitral regurg   - ESR and CRP  are BOTH ELEVATED    Clinical symptomatology favors endovascular infection, i.e. endocarditis.  Awaiting for repeat cultures results.     CONTINUE Antibiotics:  ampicillin  IVPB 2 Gram(s) IV Intermittent every 4 hours  cefTRIAXone   IVPB 2000 milliGRAM(s) IV Intermittent every 12 hours    DEFER PICC LINE UNTIL cultures are negative  anticipate  4 weeks of IV antibiotics.     PLEASE OBTAIN CT ABD/ PELVIS w/ contrast to r/o colonic or abd pathology  regarding back pain.  - this should be investigated with MRI to r/o infectious focus.     - follow up all outstanding cultures  - trend temperature and WBC curve  - repeat cultures from blood and all sources if febrile.

## 2020-02-21 NOTE — DIETITIAN INITIAL EVALUATION ADULT. - CONTINUE CURRENT NUTRITION CARE PLAN
-- add Ensure Enlive TID to optimize po intake and provide an additional 350 kcal, 20g protein per serving./yes

## 2020-02-21 NOTE — DIETITIAN INITIAL EVALUATION ADULT. - MALNUTRITION
NFPE: moderate muscle loss of temples, severe muscle loss of clavicles and shoulders, moderate fat loss of orbitals, severe fat loss of triceps severe, chronic

## 2020-02-21 NOTE — CHART NOTE - NSCHARTNOTEFT_GEN_A_CORE
Upon Nutritional Assessment by the Registered Dietitian your patient was determined to meet criteria / has evidence of the following diagnosis/diagnoses:          [ ]  Mild Protein Calorie Malnutrition        [ ]  Moderate Protein Calorie Malnutrition        [ x] Severe Protein Calorie Malnutrition        [ ] Unspecified Protein Calorie Malnutrition        [ ] Underweight / BMI <19        [ ] Morbid Obesity / BMI > 40    Pt presents at high nutrition risk secondary to malnutrition (severe, chronic) related to inability to meet sufficient protein-energy in setting of prostate cancer, depression, advanced age, and persistent lack of appetite as evidenced by meeting <75% nutrient needs >1 month, moderate muscle loss of temples, severe muscle loss of clavicles and shoulders, moderate fat loss of orbitals, severe fat loss of triceps, 8.7% wt loss x few months.    Findings as based on:  •  Comprehensive nutrition assessment and consultation  •  Calorie counts (nutrient intake analysis)  •  Food acceptance and intake status from observations by staff  •  Follow up  •  Patient education  •  Intervention secondary to interdisciplinary rounds  •   concerns      Treatment:    The following diet has been recommended:  1) Continue diet as tolerated.  2) Add Ensure Enlive TID to optimize po intake and provide an additional 350 kcal, 20g protein per serving.  3) Encourage small, frequent meals and to make protein a priority.  4) Obtain daily weights to monitor trends.        PROVIDER Section:     By signing this assessment you are acknowledging and agree with the diagnosis/diagnoses assigned by the Registered Dietitian    Comments:

## 2020-02-21 NOTE — PROGRESS NOTE ADULT - ASSESSMENT
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, Grave’s disease s/p treatment, HTN, chronic back pain likely due to old sacral fracture who was discharged from ED on 2/17 with UTI and was called back for bactremua , Id consulted , RONALD requested cardiology on board , RONALD performed no vegetations       1- UTI with cronic morrison present on admission  with bacteremia / enterococcus  infection   ID fallow up noted    RONALD done , endocarditis ruled out   repeat blood cx per ID   cont current abx therapy     2- cronic morrison due to retention / h/o prostate cancer   replaced     3- cronic back pain   with old sacral fracture   cont pain meds   neurontin added     4- Severe protein fela malnutrition   on supplement and ensure with  meals     5- anemia of cronic disease   will give venofer x3 days   add folic acid     6- severe MR   cardiology  follow up noted   no vegetation on RONALD     7- Atrial  fib   off anticoagulation due to hematuria   cont metoprolol  rate controlled

## 2020-02-22 PROCEDURE — 99232 SBSQ HOSP IP/OBS MODERATE 35: CPT

## 2020-02-22 PROCEDURE — 74176 CT ABD & PELVIS W/O CONTRAST: CPT | Mod: 26

## 2020-02-22 PROCEDURE — 71250 CT THORAX DX C-: CPT | Mod: 26

## 2020-02-22 RX ADMIN — Medication 216 GRAM(S): at 04:09

## 2020-02-22 RX ADMIN — Medication 650 MILLIGRAM(S): at 05:18

## 2020-02-22 RX ADMIN — ATORVASTATIN CALCIUM 20 MILLIGRAM(S): 80 TABLET, FILM COATED ORAL at 22:20

## 2020-02-22 RX ADMIN — Medication 12.5 MILLIGRAM(S): at 05:19

## 2020-02-22 RX ADMIN — Medication 216 GRAM(S): at 17:08

## 2020-02-22 RX ADMIN — Medication 216 GRAM(S): at 22:20

## 2020-02-22 RX ADMIN — GABAPENTIN 100 MILLIGRAM(S): 400 CAPSULE ORAL at 14:36

## 2020-02-22 RX ADMIN — Medication 650 MILLIGRAM(S): at 00:00

## 2020-02-22 RX ADMIN — CEFTRIAXONE 100 MILLIGRAM(S): 500 INJECTION, POWDER, FOR SOLUTION INTRAMUSCULAR; INTRAVENOUS at 17:49

## 2020-02-22 RX ADMIN — ENOXAPARIN SODIUM 40 MILLIGRAM(S): 100 INJECTION SUBCUTANEOUS at 12:30

## 2020-02-22 RX ADMIN — GABAPENTIN 100 MILLIGRAM(S): 400 CAPSULE ORAL at 22:20

## 2020-02-22 RX ADMIN — GABAPENTIN 100 MILLIGRAM(S): 400 CAPSULE ORAL at 05:19

## 2020-02-22 RX ADMIN — Medication 216 GRAM(S): at 00:06

## 2020-02-22 RX ADMIN — ESCITALOPRAM OXALATE 5 MILLIGRAM(S): 10 TABLET, FILM COATED ORAL at 12:30

## 2020-02-22 RX ADMIN — CEFTRIAXONE 100 MILLIGRAM(S): 500 INJECTION, POWDER, FOR SOLUTION INTRAMUSCULAR; INTRAVENOUS at 05:18

## 2020-02-22 RX ADMIN — IRON SUCROSE 110 MILLIGRAM(S): 20 INJECTION, SOLUTION INTRAVENOUS at 14:36

## 2020-02-22 RX ADMIN — Medication 216 GRAM(S): at 08:58

## 2020-02-22 RX ADMIN — Medication 216 GRAM(S): at 12:30

## 2020-02-22 RX ADMIN — TAMSULOSIN HYDROCHLORIDE 0.4 MILLIGRAM(S): 0.4 CAPSULE ORAL at 22:20

## 2020-02-22 RX ADMIN — BICALUTAMIDE 50 MILLIGRAM(S): 50 TABLET, FILM COATED ORAL at 14:36

## 2020-02-22 NOTE — PROGRESS NOTE ADULT - ASSESSMENT
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, Grave’s disease s/p treatment, HTN, chronic back pain likely due to old sacral fracture who was discharged from ED on 2/17 with UTI and was called back for bactremua , Id consulted , RONALD performed endocarditis ruled out , pt is also with lower back pain which is cronic MR of spine showed suspected sacral fracture and spinal stenosis , repeat blood cx on iv abx still positive     1- UTI with cronic morrison present on admission  with entericoccous bacteremia   on iv abx , ID follow up noted   Ct of C/A/P ordered with po contrast    repeat blood cx remains positive   cont serial blood cx once neg to insert PICC line for 4 weeks iv   d/w daughter on the phone   discharge likely to [facility] once medically stable    2- cronic morrison due to retention / h/o prostate cancer   replaced on admission     3- cronic back pain / spinal stenosis ./ scaral farcture   pain controlled   stable     4- Severe protein fela malnutrition   on supplement and ensure with  meals     5- anemia of cronic disease   will give venofer x3 days   cont  folic acid     6- severe MR   cardiology  follow up noted s/p RONALD     7- Atrial  fib   off anticoagulation due to hematuria   cont metoprolol  rate controlled

## 2020-02-22 NOTE — PROGRESS NOTE ADULT - SUBJECTIVE AND OBJECTIVE BOX
[facility]  INFECTIOUS DISEASES AND INTERNAL MEDICINE at [place]  =======================================================  Lisandro Russell MD  Diplomates American Board of Internal Medicine and Infectious Diseases  Tel: [phone]      Fax: [phone]  =======================================================    CLIFFORD CARRILLO [id number]    Follow up: enterococcus bacteremia  afebrile  feels well  pain controlled at this time  refused Ct with contrast    Allergies:  iodine (Swelling)  provide van ensure TID (Unknown)      Medications:  acetaminophen   Tablet .. 650 milliGRAM(s) Oral every 6 hours PRN  ampicillin  IVPB 2 Gram(s) IV Intermittent every 4 hours  atorvastatin 20 milliGRAM(s) Oral at bedtime  bicalutamide 50 milliGRAM(s) Oral daily  cefTRIAXone   IVPB 2000 milliGRAM(s) IV Intermittent every 12 hours  diphenhydrAMINE   Injectable 50 milliGRAM(s) IV Push once  enoxaparin Injectable 40 milliGRAM(s) SubCutaneous daily  escitalopram 5 milliGRAM(s) Oral daily  gabapentin 100 milliGRAM(s) Oral three times a day  iron sucrose IVPB 200 milliGRAM(s) IV Intermittent every 24 hours  metoprolol succinate ER 12.5 milliGRAM(s) Oral daily  oxyCODONE    IR 5 milliGRAM(s) Oral every 6 hours PRN  tamsulosin 0.4 milliGRAM(s) Oral at bedtime            REVIEW OF SYSTEMS:  CONSTITUTIONAL:  No Fever or chills  HEENT:   No diplopia or blurred vision.  No earache, sore throat or runny nose.  CARDIOVASCULAR:  No pressure, squeezing, strangling, tightness, heaviness or aching about the chest, neck, axilla or epigastrium.  RESPIRATORY:  No cough, shortness of breath  GASTROINTESTINAL:  No nausea, vomiting or diarrhea.  GENITOURINARY:  No dysuria, frequency or urgency. No Blood in urine  MUSCULOSKELETAL:  no joint aches, no muscle pain  SKIN:  No change in skin, hair or nails.  NEUROLOGIC:  No Headaches, seizures or weakness.  PSYCHIATRIC:  No disorder of thought or mood.  ENDOCRINE:  No heat or cold intolerance  HEMATOLOGICAL:  No easy bruising or bleeding.            Physical Exam:  ICU Vital Signs Last 24 Hrs  T(C): 37.1 (22 Feb 2020 07:03), Max: 37.1 (22 Feb 2020 07:03)  T(F): 98.7 (22 Feb 2020 07:03), Max: 98.7 (22 Feb 2020 07:03)  HR: 60 (22 Feb 2020 07:03) (60 - 87)  BP: 130/70 (22 Feb 2020 07:03) (120/78 - 130/70)  BP(mean): --  ABP: --  ABP(mean): --  RR: 18 (22 Feb 2020 07:03) (18 - 18)  SpO2: 98% (21 Feb 2020 15:41) (98% - 98%)      GEN: NAD, pleasant  HEENT: normocephalic and atraumatic. EOMI. PERRL.  Anicteric   NECK: Supple.   LUNGS: Clear to auscultation.  HEART: Regular rate and rhythm + murmur.  ABDOMEN: Soft, nontender, and nondistended.  Positive bowel sounds.    : No CVA tenderness + morrison  EXTREMITIES: Without any edema.  MSK: no joint swelling  NEUROLOGIC: Cranial nerves II through XII are grossly intact. No focal deficits  PSYCHIATRIC: Appropriate affect .  SKIN: No Rash      Labs:                      CAPILLARY BLOOD GLUCOSE            RECENT CULTURES:  02-20 @ 10:45 .Blood Enterococcus faecalis    Growth in aerobic and anaerobic bottles: Enterococcus faecalis  Anaerobic Bottle: 23:01 Hours to positivity  Aerobic Bottle: 1 day; 03:20 Hours to positivity  ,  TYPE: (C=Critical, N=Notification, A=Abnormal) c  TESTS:  _   DATE/TIME CALLED: _ 02/21/2020 10:50:22  CALLED TO: Venus white rn  READ BACK (2 Patient Identifiers)(Y/N): _ y  READ BACK VALUES (Y/N): _ y  CALLED BY: Venus boss        02-19 @ 14:22 .Blood Enterococcus faecalis    Growth in aerobic and anaerobic bottles: Enterococcus faecalis  Anaerobic Bottle: 20:16 Hours to positivity  Aerobic Bottle: 21:16 Hours to positivity  .  TYPE: (C=Critical, N=Notification, A=Abnormal) C  TESTS:  _ Positive Blood GS  DATE/TIME CALLED: _ 02/20/2020 11:50  CALLED TO: _ [id number]: Catalino Zhang RN  READ BACK (2 Patient Identifiers)(Y/N): _ Y  READ BACK VALUES (Y/N): _ Y  CALLED BY: Venus vincent        02-19 @ 01:35 .Urine     <10,000 CFU/mL Normal Urogenital Mandie        02-18 @ 18:39 .Blood Enterococcus faecalis    Growth in aerobic and anaerobic bottles: Enterococcus faecalis  Aerobic Bottle: 12:08 Hours to positivity  Anaerobic Bottle: 12:28 Hours to positivity  .  TYPE: (C=Critical, N=Notification, A=Abnormal) C  TESTS:  _ Positive Blood GS  DATE/TIME CALLED: _ 02/19/2020 10:10  CALLED TO: _ ERHR: Marian Schulte RN  READ BACK (2 Patient Identifiers)(Y/N): _ Y  READ BACK VALUES (Y/N): _ Y  CALLED BY: Venus vincent        02-18 @ 00:50 .Blood Blood Culture PCR  Enterococcus faecalis    Growth in aerobic and anaerobic bottles: Enterococcus faecalis  Anaerobic Bottle: 12:04 Hours to positivity  Aerobic Bottle: 13:08 Hours to positivity  ***Blood Panel PCR results on this specimen are available  approximately 3 hours after the Gram stain result.***  Gram stain, PCR, and/or culture results may not always  correspond due to difference in methodologies.  ************************************************************  This PCR assay was performed using [employer].  The following targets are tested for: Enterococcus,  vancomycin resistant enterococci, Listeria monocytogenes,  coagulase negative staphylococci, S. aureus,  methicillin resistant S. aureus, Streptococcus agalactiae  (Group B), S. pneumoniae, S. pyogenes (Group A),  Acinetobacter baumannii, Enterobacter cloacae, E. coli,  Klebsiella oxytoca, K. pneumoniae, Proteus sp.,  Serratia marcescens, Haemophilus influenzae,  Neisseria meningitidis, Pseudomonas aeruginosa, Candida  albicans, C. glabrata, C krusei, C parapsilosis,  C. tropicalis and the KPC resistance gene.  .  TYPE: (C=Critical, N=Notification, A=Abnormal)                                C                    TESTS:  _ bld gs  DATE/TIME CALLED: _ 02/18/2020 14:57:24  CALLED TO: Venus KINCAID  READBACK (2 Patient Identifiers)(Y/N): _ Y  READ BACK VALUES (Y/N): _ Y  CALLED BY: Venus Boraccishin        02-18 @ 00:49 .Blood Enterococcus faecalis    Growth in aerobic and anaerobic bottles: Enterococcus faecalis  Anaerobic Bottle: 12:04 Hours to positivity  Aerobic Bottle: 13:18 Hours to positivity  .  TYPE: (C=Critical, N=Notification, A=Abnormal) C  TESTS:  _ Bld gs  DATE/TIME CALLED: _ 02/18/2020 15:16:55  CALLED TO: Venus KINCAID  READ BACK (2 Patient Identifiers)(Y/N): _ Y  READ BACK VALUES (Y/N): _ Y  CALLED BY: Venus Boraccishin        02-17 @ 21:05 .Urine Enterococcus faecalis    50,000 - 99,000 CFU/mL Enterococcus faecalis  10,000 - 49,000 CFU/mL Coag Negative Staphylococcus "Susceptibilities not  performed"      < from: MR Lumbar Spine No Cont (02.20.20 @ 19:33) >  IMPRESSION:  Partially visualized edema right sacrum suspicious for insufficiency fracture.    Multilevel degenerative changes resulting in multilevel spinal canal stenosis and neural foraminal narrowing as described above. Severe spinal canal stenosis at L2-3-4 and L4-5 with compression of the nerve roots.    Nonspecific endplate signal changes at L3-4 with high T2 disc signal likely degenerative. If there is clinical concern for early discitis osteomyelitis, short-term follow-up MRI exam with contrast recommended    Preliminary report provided by [facility] on 2/20/2020      < end of copied text >

## 2020-02-22 NOTE — PROGRESS NOTE ADULT - SUBJECTIVE AND OBJECTIVE BOX
Internal Medicine Hospitalist Progress Note    fallow up for sepsis bacteremia  he is seen earlier today , sitting in the chair no complaints   he has no back pain , states that much improved    last night events noted ; pt refused CT scan due to severe reaction with iv contrast in the past   order of Ct scan changed to oral contrast only     Vital Signs Last 24 Hrs  T(C): 37.1 (22 Feb 2020 07:03), Max: 37.1 (22 Feb 2020 07:03)  T(F): 98.7 (22 Feb 2020 07:03), Max: 98.7 (22 Feb 2020 07:03)  HR: 60 (22 Feb 2020 07:03) (60 - 87)  BP: 130/70 (22 Feb 2020 07:03) (120/78 - 130/70)  BP(mean): --  RR: 18 (22 Feb 2020 07:03) (18 - 18)  SpO2: 98% (21 Feb 2020 15:41) (98% - 98%)      Constitutional: cachectic ,no distress    Neck: supple , no JVD     Respiratory: CTA bilateral , no rales     Cardiovascular: regular s1 /s2 , systolic severe murmur at apex     Gastrointestinal: soft no tenderness , BS positive     Extremities: no edema      morrison in place clear urine                               8.2    4.66  )-----------( 153      ( 20 Feb 2020 10:43 )             26.4   02-20    135  |  98  |  11.0  ----------------------------<  90  4.1   |  26.0  |  0.63    Ca    8.7      20 Feb 2020 10:42  Mg     1.6     02-20      Culture - Blood in AM (02.20.20 @ 10:45)    -  Ampicillin: S <=2 Predicts results to ampicillin/sulbactam, amoxacillin-clavulanate and  piperacillin-tazobactam.    -  Vancomycin: S 1    -  Gentamicin synergy: S    -  Streptomycin synergy: S    Specimen Source: .Blood    Organism: Enterococcus faecalis    Culture Results:   Growth in aerobic and anaerobic bottles: Enterococcus faecalis  Anaerobic Bottle: 23:02 Hours to positivity  Aerobic Bottle: 1 day; 03:13 Hours to positivity  ,  TYPE: (C=Critical, N=Notification, A=Abnormal) c  TESTS:  _ gs  DATE/TIME CALLED: _ 02/21/2020 10:47:55  CALLED TO: Venus white rn  READ BACK (2 Patient Identifiers)(Y/N): _ y  READ BACK VALUES (Y/N): _ y  CALLED BY: Venus boss    Organism Identification: Enterococcus faecalis    Method Type: STEPHANIE        Culture - Blood (02.18.20 @ 00:50)    -  Gentamicin synergy: S    -  Streptomycin synergy: S    -  Enterococcus species: Detec    -  Vancomycin: S 1    -  Ampicillin: S <=2 Predicts results to ampicillin/sulbactam, amoxacillin-clavulanate and  piperacillin-tazobactam.    Specimen Source: .Blood    Organism: Blood Culture PCR    Organism: Enterococcus faecalis    Culture Results:   Growth in aerobic and anaerobic bottles: Enterococcus faecalis  Anaerobic Bottle: 12:04 Hours to positivity  Aerobic Bottle: 13:08 Hours to positivity  ***Blood Panel PCR results on this specimen are available  approximately 3 hours after the Gram stain result.***  Gram stain, PCR, and/or culture results may not always  correspond due to difference in methodologies.  ************************************************************  This PCR assay was performed using [employer].  The following targets are tested for: Enterococcus,  vancomycin resistant enterococci, Listeria monocytogenes,  coagulase negative staphylococci, S. aureus,  methicillin resistant S. aureus, Streptococcus agalactiae  (Group B), S. pneumoniae, S. pyogenes (Group A),  Acinetobacter baumannii, Enterobacter cloacae, E. coli,  Klebsiella oxytoca, K. pneumoniae, Proteus sp.,  Serratia marcescens, Haemophilus influenzae,  Neisseria meningitidis, Pseudomonas aeruginosa, Candida  albicans, C. glabrata, C krusei, C parapsilosis,  C. tropicalis and the KPC resistance gene.  .  TYPE: (C=Critical, N=Notification, A=Abnormal)        Radiology :

## 2020-02-22 NOTE — PROGRESS NOTE ADULT - ASSESSMENT
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     Impression:  High grade bacteremia  Enterococcus species bacteremia  Mitral regurgitation  lumbar pain  fevers at home      Plan:  repeat cultures in process.   NO obvious vegetation on RONALD, flail mitral leaflet  with severe mitral regurg   ESR and CRP  are BOTH ELEVATED    Clinical symptomatology favors endovascular infection, i.e. endocarditis.  Awaiting for repeat cultures results.     CONTINUE Antibiotics:  ampicillin  IVPB 2 Gram(s) IV Intermittent every 4 hours  cefTRIAXone   IVPB 2000 milliGRAM(s) IV Intermittent every 12 hours    DEFER PICC LINE UNTIL cultures are negative  anticipate  4 weeks of IV antibiotics.   patient is refusing CT a/p with contrast due to history of thyrotoxicoses. Non contrast study may not be helpful but he is willing to pursue without contrast only  MRI multilevel degenerative changes, severe canal stenosis, right sacrum possible insufficiency fracture      - follow up all outstanding cultures  - trend temperature and WBC curve  - repeat cultures from blood and all sources if febrile.

## 2020-02-23 LAB
ANION GAP SERPL CALC-SCNC: 13 MMOL/L — SIGNIFICANT CHANGE UP (ref 5–17)
BUN SERPL-MCNC: 9 MG/DL — SIGNIFICANT CHANGE UP (ref 8–20)
CALCIUM SERPL-MCNC: 8.6 MG/DL — SIGNIFICANT CHANGE UP (ref 8.6–10.2)
CHLORIDE SERPL-SCNC: 98 MMOL/L — SIGNIFICANT CHANGE UP (ref 98–107)
CO2 SERPL-SCNC: 29 MMOL/L — SIGNIFICANT CHANGE UP (ref 22–29)
CREAT SERPL-MCNC: 0.65 MG/DL — SIGNIFICANT CHANGE UP (ref 0.5–1.3)
GLUCOSE SERPL-MCNC: 102 MG/DL — HIGH (ref 70–99)
HCT VFR BLD CALC: 27.4 % — LOW (ref 39–50)
HGB BLD-MCNC: 8.5 G/DL — LOW (ref 13–17)
MAGNESIUM SERPL-MCNC: 1.8 MG/DL — SIGNIFICANT CHANGE UP (ref 1.8–2.6)
MCHC RBC-ENTMCNC: 25.4 PG — LOW (ref 27–34)
MCHC RBC-ENTMCNC: 31 GM/DL — LOW (ref 32–36)
MCV RBC AUTO: 82 FL — SIGNIFICANT CHANGE UP (ref 80–100)
PHOSPHATE SERPL-MCNC: 2.2 MG/DL — LOW (ref 2.4–4.7)
PLATELET # BLD AUTO: 220 K/UL — SIGNIFICANT CHANGE UP (ref 150–400)
POTASSIUM SERPL-MCNC: 3.6 MMOL/L — SIGNIFICANT CHANGE UP (ref 3.5–5.3)
POTASSIUM SERPL-SCNC: 3.6 MMOL/L — SIGNIFICANT CHANGE UP (ref 3.5–5.3)
RBC # BLD: 3.34 M/UL — LOW (ref 4.2–5.8)
RBC # FLD: 16.8 % — HIGH (ref 10.3–14.5)
SODIUM SERPL-SCNC: 140 MMOL/L — SIGNIFICANT CHANGE UP (ref 135–145)
WBC # BLD: 7.55 K/UL — SIGNIFICANT CHANGE UP (ref 3.8–10.5)
WBC # FLD AUTO: 7.55 K/UL — SIGNIFICANT CHANGE UP (ref 3.8–10.5)

## 2020-02-23 PROCEDURE — 99233 SBSQ HOSP IP/OBS HIGH 50: CPT

## 2020-02-23 RX ADMIN — Medication 12.5 MILLIGRAM(S): at 06:40

## 2020-02-23 RX ADMIN — GABAPENTIN 100 MILLIGRAM(S): 400 CAPSULE ORAL at 06:39

## 2020-02-23 RX ADMIN — CEFTRIAXONE 100 MILLIGRAM(S): 500 INJECTION, POWDER, FOR SOLUTION INTRAMUSCULAR; INTRAVENOUS at 07:07

## 2020-02-23 RX ADMIN — CEFTRIAXONE 100 MILLIGRAM(S): 500 INJECTION, POWDER, FOR SOLUTION INTRAMUSCULAR; INTRAVENOUS at 19:16

## 2020-02-23 RX ADMIN — GABAPENTIN 100 MILLIGRAM(S): 400 CAPSULE ORAL at 22:10

## 2020-02-23 RX ADMIN — BICALUTAMIDE 50 MILLIGRAM(S): 50 TABLET, FILM COATED ORAL at 12:46

## 2020-02-23 RX ADMIN — Medication 216 GRAM(S): at 12:46

## 2020-02-23 RX ADMIN — Medication 216 GRAM(S): at 06:39

## 2020-02-23 RX ADMIN — TAMSULOSIN HYDROCHLORIDE 0.4 MILLIGRAM(S): 0.4 CAPSULE ORAL at 22:10

## 2020-02-23 RX ADMIN — Medication 216 GRAM(S): at 01:06

## 2020-02-23 RX ADMIN — ATORVASTATIN CALCIUM 20 MILLIGRAM(S): 80 TABLET, FILM COATED ORAL at 22:10

## 2020-02-23 RX ADMIN — ESCITALOPRAM OXALATE 5 MILLIGRAM(S): 10 TABLET, FILM COATED ORAL at 12:45

## 2020-02-23 RX ADMIN — Medication 216 GRAM(S): at 16:54

## 2020-02-23 RX ADMIN — ENOXAPARIN SODIUM 40 MILLIGRAM(S): 100 INJECTION SUBCUTANEOUS at 12:46

## 2020-02-23 RX ADMIN — GABAPENTIN 100 MILLIGRAM(S): 400 CAPSULE ORAL at 14:57

## 2020-02-23 RX ADMIN — Medication 216 GRAM(S): at 08:00

## 2020-02-23 NOTE — PROGRESS NOTE ADULT - SUBJECTIVE AND OBJECTIVE BOX
Internal Medicine Hospitalist Progress Note    seen fallow up for sepsis bacteremia    Interval : no complaints. all ROS neg  chart reviewed,    Vital Signs Last 24 Hrs  T(C): 36.9 (23 Feb 2020 16:40), Max: 36.9 (23 Feb 2020 16:40)  T(F): 98.4 (23 Feb 2020 16:40), Max: 98.4 (23 Feb 2020 16:40)  HR: 80 (23 Feb 2020 16:40) (72 - 80)  BP: 136/70 (23 Feb 2020 16:40) (110/66 - 136/70)  BP(mean): --  RR: 18 (23 Feb 2020 16:40) (18 - 18)  SpO2: 95% (22 Feb 2020 23:54) (95% - 95%)    CAPILLARY BLOOD GLUCOSE      GENERAL: Not in distress. Alert    HEENT:  Normocephalic and atraumatic.   NECK: Supple.    CARDIOVASCULAR: RRR S1, S2. No murmur/rubs/gallop  LUNGS: BLAE+, no rales, no wheezing, no rhonchi.    ABDOMEN: ND. Soft,  NT, no guarding / rebound / rigidity. BS normoactive. No CVA tenderness.  Silva+  EXTREMITIES: no cyanosis, no clubbing, no edema.   NEUROLOGIC: moving limbs    PSYCHIATRIC: Calm.  No agitation.                          8.5    7.55  )-----------( 220      ( 23 Feb 2020 09:35 )             27.4       02-23    140  |  98  |  9.0  ----------------------------<  102<H>  3.6   |  29.0  |  0.65    Ca    8.6      23 Feb 2020 09:35  Phos  2.2     02-23  Mg     1.8     02-23      < from: CT Chest No Cont (02.22.20 @ 21:13) >  IMPRESSION:   1. No acute abdominal/pelvic CT findings.   2. Mildly enlarged prostate indenting bladder base.   3. Silva catheter within decompressed thick-walled bladder whichmay be   secondary to decompressed state or underlying trabeculation however is cystitis   not excluded. Please correlate with urinary analysis.    < end of copied text >    Culture - Blood in AM (02.20.20 @ 10:45)    -  Ampicillin: S <=2 Predicts results to ampicillin/sulbactam, amoxacillin-clavulanate and  piperacillin-tazobactam.    -  Vancomycin: S 1    -  Gentamicin synergy: S    -  Streptomycin synergy: S    Specimen Source: .Blood    Organism: Enterococcus faecalis    Culture Results:   Growth in aerobic and anaerobic bottles: Enterococcus faecalis  Anaerobic Bottle: 23:02 Hours to positivity  Aerobic Bottle: 1 day; 03:13 Hours to positivity  ,  TYPE: (C=Critical, N=Notification, A=Abnormal) c  TESTS:  _ gs  DATE/TIME CALLED: _ 02/21/2020 10:47:55  CALLED TO: Venus white rn  READ BACK (2 Patient Identifiers)(Y/N): _ y  READ BACK VALUES (Y/N): _ y  CALLED BY: Venus boss    Organism Identification: Enterococcus faecalis    Method Type: STEPHANIE        Culture - Blood (02.18.20 @ 00:50)    -  Gentamicin synergy: S    -  Streptomycin synergy: S    -  Enterococcus species: Detec    -  Vancomycin: S 1    -  Ampicillin: S <=2 Predicts results to ampicillin/sulbactam, amoxacillin-clavulanate and  piperacillin-tazobactam.    Specimen Source: .Blood    Organism: Blood Culture PCR    Organism: Enterococcus faecalis    Culture Results:   Growth in aerobic and anaerobic bottles: Enterococcus faecalis  Anaerobic Bottle: 12:04 Hours to positivity  Aerobic Bottle: 13:08 Hours to positivity  ***Blood Panel PCR results on this specimen are available  approximately 3 hours after the Gram stain result.***  Gram stain, PCR, and/or culture results may not always  correspond due to difference in methodologies.  ************************************************************  This PCR assay was performed using [employer].  The following targets are tested for: Enterococcus,  vancomycin resistant enterococci, Listeria monocytogenes,  coagulase negative staphylococci, S. aureus,  methicillin resistant S. aureus, Streptococcus agalactiae  (Group B), S. pneumoniae, S. pyogenes (Group A),  Acinetobacter baumannii, Enterobacter cloacae, E. coli,  Klebsiella oxytoca, K. pneumoniae, Proteus sp.,  Serratia marcescens, Haemophilus influenzae,  Neisseria meningitidis, Pseudomonas aeruginosa, Candida  albicans, C. glabrata, C krusei, C parapsilosis,  C. tropicalis and the KPC resistance gene.  .  TYPE: (C=Critical, N=Notification, A=Abnormal)        Radiology :

## 2020-02-23 NOTE — PROGRESS NOTE ADULT - ASSESSMENT
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, Grave’s disease s/p treatment, HTN, chronic back pain likely due to old sacral fracture who was discharged from ED on 2/17 with UTI and was called back for bactremua , Id consulted , RONALD performed endocarditis ruled out , pt is also with lower back pain which is cronic MR of spine showed suspected sacral fracture and spinal stenosis , repeat blood cx on iv abx still positive     1- UTI with cronic morrison present on admission  with entericoccous bacteremia   Ct of C/A/P ordered with po contrast : none acute, ?cystitis  repeat blood cx remains positive   cont serial blood cx once neg to insert PICC line for 4 weeks iv   No vegetation on RONALD, flail mitral leaflet  with severe mitral regurg   ESR and CRP  elevated  on iv abx , ID follow up noted     2- chronic morrison due to retention / h/o prostate cancer   replaced on admission     3- chronic back pain / spinal stenosis ./ scaral farcture   pain controlled   stable     4- Severe protein fela malnutrition : ensure    5- anemia of cronic disease   venofer x3 days   cont  folic acid     6- severe MR   cardiology  follow up noted s/p RONALD     7- Atrial  fib   off anticoagulation due to hematuria   cont metoprolol  rate controlled     dispo: discharge likely to [facility] once medically stable 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, Grave’s disease s/p treatment, HTN, chronic back pain likely due to old sacral fracture who was discharged from ED on 2/17 with UTI and was called back for bactremua , Id consulted , RONALD performed endocarditis ruled out , pt is also with lower back pain which is cronic MR of spine showed suspected sacral fracture and spinal stenosis , repeat blood cx on iv abx still positive     1- UTI with cronic morrison present on admission  with entericoccous bacteremia   Ct of C/A/P ordered with po contrast : none acute, ?cystitis  repeat blood cx remains positive until 2/20  concern about IE  BC 2/21: neg,  BC 2/22: pending  needs PICC line for 4 weeks iv once BC neg  No vegetation on RONALD, flail mitral leaflet  with severe mitral regurg   ESR and CRP  elevated  on iv abx , ID follow up noted     2- chronic morrison due to retention / h/o prostate cancer   replaced on admission     3- chronic back pain / spinal stenosis ./ scaral farcture   pain controlled   stable     4- Severe protein fela malnutrition : ensure    5- anemia of cronic disease   venofer x3 days   cont  folic acid     6- severe MR   cardiology  follow up noted s/p RONALD     7- Atrial  fib   off anticoagulation due to hematuria   cont metoprolol  rate controlled     dispo: discharge likely to [facility] once medically stable 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, Grave’s disease s/p treatment, HTN, chronic back pain likely due to old sacral fracture who was discharged from ED on 2/17 with UTI and was called back for bactremua , Id consulted , RONALD performed endocarditis ruled out , pt is also with lower back pain which is cronic MR of spine showed suspected sacral fracture and spinal stenosis , repeat blood cx on iv abx still positive     1- UTI with cronic morrison present on admission  with entericoccous bacteremia   Ct of C/A/P ordered with po contrast : none acute, ?cystitis  repeat blood cx remains positive until 2/20  concern about IE  BC 2/21: neg,  BC 2/22: pending  needs PICC line for long term Abx  No vegetation on RONALD, flail mitral leaflet  with severe mitral regurg   ESR and CRP  elevated  on iv abx , ID follow up noted     2- chronic morrison due to retention / h/o prostate cancer   replaced on admission  flomax     3- chronic back pain / spinal stenosis ./ sacral farcture   pain controlled   stable     4- Severe protein fela malnutrition : ensure. nutrition cx    5- anemia of cronic disease   venofer x3 days   cont  folic acid     6- severe MR   cardiology  follow up noted s/p RONALD     7- Atrial  fib   off anticoagulation due to hematuria   cont metoprolol  rate controlled     dispo: discharge likely to [facility] once medically stable

## 2020-02-24 DIAGNOSIS — Z71.89 OTHER SPECIFIED COUNSELING: ICD-10-CM

## 2020-02-24 PROCEDURE — 99233 SBSQ HOSP IP/OBS HIGH 50: CPT

## 2020-02-24 PROCEDURE — 99232 SBSQ HOSP IP/OBS MODERATE 35: CPT

## 2020-02-24 RX ORDER — SACCHAROMYCES BOULARDII 250 MG
250 POWDER IN PACKET (EA) ORAL
Refills: 0 | Status: DISCONTINUED | OUTPATIENT
Start: 2020-02-24 | End: 2020-02-26

## 2020-02-24 RX ADMIN — Medication 250 MILLIGRAM(S): at 18:28

## 2020-02-24 RX ADMIN — Medication 12.5 MILLIGRAM(S): at 05:06

## 2020-02-24 RX ADMIN — BICALUTAMIDE 50 MILLIGRAM(S): 50 TABLET, FILM COATED ORAL at 12:29

## 2020-02-24 RX ADMIN — Medication 216 GRAM(S): at 12:30

## 2020-02-24 RX ADMIN — Medication 216 GRAM(S): at 08:46

## 2020-02-24 RX ADMIN — GABAPENTIN 100 MILLIGRAM(S): 400 CAPSULE ORAL at 14:10

## 2020-02-24 RX ADMIN — ATORVASTATIN CALCIUM 20 MILLIGRAM(S): 80 TABLET, FILM COATED ORAL at 21:04

## 2020-02-24 RX ADMIN — TAMSULOSIN HYDROCHLORIDE 0.4 MILLIGRAM(S): 0.4 CAPSULE ORAL at 21:04

## 2020-02-24 RX ADMIN — Medication 216 GRAM(S): at 00:59

## 2020-02-24 RX ADMIN — Medication 216 GRAM(S): at 19:56

## 2020-02-24 RX ADMIN — CEFTRIAXONE 100 MILLIGRAM(S): 500 INJECTION, POWDER, FOR SOLUTION INTRAMUSCULAR; INTRAVENOUS at 05:06

## 2020-02-24 RX ADMIN — Medication 216 GRAM(S): at 23:16

## 2020-02-24 RX ADMIN — ESCITALOPRAM OXALATE 5 MILLIGRAM(S): 10 TABLET, FILM COATED ORAL at 12:29

## 2020-02-24 RX ADMIN — Medication 216 GRAM(S): at 17:27

## 2020-02-24 RX ADMIN — ENOXAPARIN SODIUM 40 MILLIGRAM(S): 100 INJECTION SUBCUTANEOUS at 12:29

## 2020-02-24 RX ADMIN — CEFTRIAXONE 100 MILLIGRAM(S): 500 INJECTION, POWDER, FOR SOLUTION INTRAMUSCULAR; INTRAVENOUS at 18:28

## 2020-02-24 RX ADMIN — Medication 216 GRAM(S): at 04:09

## 2020-02-24 RX ADMIN — GABAPENTIN 100 MILLIGRAM(S): 400 CAPSULE ORAL at 05:05

## 2020-02-24 RX ADMIN — GABAPENTIN 100 MILLIGRAM(S): 400 CAPSULE ORAL at 21:04
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
## 2020-02-24 NOTE — PROGRESS NOTE ADULT - ASSESSMENT
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 ID continues to follow the pt, thus far B cx 2/21 and 2/22 negative 48 hrs. Repeat ordered for today. Pt remains on rocephin and ampicillin. Pt also with acute on chronic lower back pain, MR of spine showed suspected sacral fracture and spinal stenosis, pt optimized with pain control. Slow clinical improvement.     Enteroccosus UTI and bacteremia in the setting of chronic indwelling morrison catheter    - afebrile   - no leukocytosis   - esr/ crp elevated   - Repeat B cx 2/21 and 2/22 thus far negative   - another set to be sent today   - Ct of C/A/P ordered with po contrast : none acute, ?cystitis   - No vegetation noted on RONALD   - c/w ampicillin and rocephin   - ID f/u noted and appreciated     Chronic morrison due to retention / h/o prostate cancer   -replaced on admission  -c/w flomax po qhs   - c/w morrison catheter care      Anemia of cronic disease   -h/h stable   - s/p venofer x3 days   -c/w ferrous sulfate and folic acid     Severe MR   -RONALD shows Flail PMVL noted likely from progression of prior known MVP  -When cleared from ID and likely as an outpt to f/u with cardio for MVR evaluation   -cardiology  follow up noted and appreciated     Atrial  fib   -rate controlled   -off anticoagulation due to hematuria   -cont metoprolol  po qd     HTN  - bp stable  - c/w metoprolol po qd     HLD  - c/w atorvastain po qhs     Chronic back pain / spinal stenosis / sacral farcture   -pain controlled   - c/w gabapentin   - c/w oxycodone prn   - PT consult noted     Depression   c/w lexapro po qd     Severe protein fela malnutrition   - c/w ensure po tid   - nutirition consult noted and appreciated     DVT ppx  - c/w lovenox sq qd     Dispo: PT evaluation noted, plan for FREEMAN on discharge. Awaiting repeat B cx to remain negative. If negative and cleared by ID then pt will need PICC line to finish IV abx course per ID. 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 ID continues to follow the pt, thus far B cx 2/21 and 2/22 negative 48 hrs. Repeat ordered for today. Pt remains on rocephin and ampicillin. Pt also with acute on chronic lower back pain, MR of spine showed suspected sacral fracture and spinal stenosis, pt optimized with pain control. Slow clinical improvement.     Enteroccosus UTI and bacteremia in the setting of chronic indwelling morrison catheter    - afebrile   - no leukocytosis   - esr/ crp elevated   - Repeat B cx 2/21 and 2/22 thus far negative   - another set to be sent today   - Ct of C/A/P ordered with po contrast : none acute, ?cystitis   - No vegetation noted on RONALD   - c/w ampicillin and rocephin   - ID f/u noted and appreciated     Chronic morrison due to retention / h/o prostate cancer   -replaced on admission  -c/w flomax po qhs   - c/w casodex po qd   - c/w morrison catheter care      Anemia of cronic disease   -h/h stable   - s/p venofer x3 days   -c/w ferrous sulfate and folic acid     Severe MR   -RONALD shows Flail PMVL noted likely from progression of prior known MVP  -When cleared from ID and likely as an outpt to f/u with cardio for MVR evaluation   -cardiology  follow up noted and appreciated     Atrial  fib   -rate controlled   -off anticoagulation due to hematuria   -cont metoprolol  po qd     HTN  - bp stable  - c/w metoprolol po qd     HLD  - c/w atorvastain po qhs     Chronic back pain / spinal stenosis / sacral farcture   -pain controlled   - c/w gabapentin   - c/w oxycodone prn   - PT consult noted     Depression   c/w lexapro po qd     Severe protein fela malnutrition   - c/w ensure po tid   - nutirition consult noted and appreciated     DVT ppx  - c/w lovenox sq qd     Dispo: PT evaluation noted, plan for FREEMAN on discharge. Awaiting repeat B cx to remain negative. If negative and cleared by ID then pt will need PICC line to finish IV abx course per ID.

## 2020-02-24 NOTE — PROGRESS NOTE ADULT - ASSESSMENT
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     Impression:  High grade bacteremia  Enterococcus species bacteremia  Mitral regurgitation  lumbar pain  fevers at home    Plan:  MULTIPLE 16/16 culture bottles positive so far  repeat cultures in process.     NO obvious vegetation on RONALD, flail mitral leaflet  with severe mitral regurg   - ESR and CRP  are BOTH ELEVATED    Clinical symptomatology favors endovascular infection, i.e. endocarditis.  Awaiting for repeat cultures results.     CONTINUE Antibiotics:  ampicillin  IVPB 2 Gram(s) IV Intermittent every 4 hours  cefTRIAXone   IVPB 2000 milliGRAM(s) IV Intermittent every 12 hours    DEFER PICC LINE UNTIL cultures are negative  anticipate  4 weeks of IV antibiotics.     PLEASE OBTAIN CT ABD/ PELVIS w/ contrast to r/o colonic or abd pathology  regarding back pain.  - this should be investigated with MRI to r/o infectious focus.     - follow up all outstanding cultures  - trend temperature and WBC curve  - repeat cultures from blood and all sources if febrile.

## 2020-02-24 NOTE — CHART NOTE - NSCHARTNOTEFT_GEN_A_CORE
Source: Patient [x]  Family [ ]   other [x ]    Current Diet:  DASH/TLC:   Sodium & Cholesterol Restricted     Patient reports [ ] nausea  [ ] vomiting [ ] diarrhea [ ] constipation  [ ]chewing problems [ ] swallowing issues  [ ] other:     PO intake: [ ] < 50% [x ]   50-75%  [ ]   %  [ ]  other :    Source for PO intake [x ] Patient [ ] family [x ] chart [ ] staff [ ] other    Current Weight:   2/19/2020: 137.1#  Continue to monitor weight trends.    Pertinent Medications: MEDICATIONS  (STANDING):  ampicillin  IVPB 2 Gram(s) IV Intermittent every 4 hours  atorvastatin 20 milliGRAM(s) Oral at bedtime  bicalutamide 50 milliGRAM(s) Oral daily  cefTRIAXone   IVPB 2000 milliGRAM(s) IV Intermittent every 12 hours  diphenhydrAMINE   Injectable 50 milliGRAM(s) IV Push once  enoxaparin Injectable 40 milliGRAM(s) SubCutaneous daily  escitalopram 5 milliGRAM(s) Oral daily  gabapentin 100 milliGRAM(s) Oral three times a day  metoprolol succinate ER 12.5 milliGRAM(s) Oral daily  tamsulosin 0.4 milliGRAM(s) Oral at bedtime    MEDICATIONS  (PRN):  acetaminophen   Tablet .. 650 milliGRAM(s) Oral every 6 hours PRN Temp greater or equal to 38C (100.4F), Mild Pain (1 - 3), Moderate Pain (4 - 6)  oxyCODONE    IR 5 milliGRAM(s) Oral every 6 hours PRN Severe Pain (7 - 10)    Pertinent Labs: CBC Full  -  ( 23 Feb 2020 09:35 )  WBC Count : 7.55 K/uL  RBC Count : 3.34 M/uL  Hemoglobin : 8.5 g/dL  Hematocrit : 27.4 %  Platelet Count - Automated : 220 K/uL  Mean Cell Volume : 82.0 fl  Mean Cell Hemoglobin : 25.4 pg  Mean Cell Hemoglobin Concentration : 31.0 gm/dL  Auto Neutrophil # : x  Auto Lymphocyte # : x  Auto Monocyte # : x  Auto Eosinophil # : x  Auto Basophil # : x  Auto Neutrophil % : x  Auto Lymphocyte % : x  Auto Monocyte % : x  Auto Eosinophil % : x  Auto Basophil % : x    Skin: no pressure injury noted    Nutrition focused physical exam previously conducted - found signs of malnutrition [ ]absent [ x]present    Subcutaneous fat loss: [x ] Orbital fat pads region, [ ]Buccal fat region, [x ]Triceps region,  [ ]Ribs region    Muscle wasting: [x ]Temples region, [ x]Clavicle region, [ x]Shoulder region, [ ]Scapula region, [ ]Interosseous region,  [ ]thigh region, [ ]Calf region    Estimated Needs:   [ x] no change since previous assessment  [ ] recalculated:     Current Nutrition Diagnosis: Severe chronic malnutrition related to inability to meet sufficient protein-energy in setting of prostate cancer, depression, advanced age, and persistent lack of appetite as evidenced by meeting <75% nutrient needs >1 mo, moderate/severe muscle/fat loss, and 8.7% weight loss.   Pt reports having a fair appetite. Pt reports having around 50% of meals. Encouraged PO intake.     Recommendations: Continue diet as tolerated. Recommend Ensure TID to optimize PO intake and provide an additional 350kcal and 20g protein per serving.    Monitoring and Evaluation:   [x ] PO intake [ ] Tolerance to diet prescription [X] Weights  [X] Follow up per protocol [X] Labs

## 2020-02-24 NOTE — PROGRESS NOTE ADULT - SUBJECTIVE AND OBJECTIVE BOX
[facility]  INFECTIOUS DISEASES AND INTERNAL MEDICINE at [place]  =======================================================  Lisandro Russell MD  Diplomates American Board of Internal Medicine and Infectious Diseases  Telephone [phone]  Fax            [phone]  =======================================================    [id number]  CLIFFORD DOSSE   follow up: enterococcus bacteremia    labs reviewed; 16/16 bottles positive blood cx for enterococcus faecalis  SS to ampicillin  most recent culture  in process.     no fevers     =======================================================    REVIEW OF SYSTEMS:  CONSTITUTIONAL:  FATIGUE    HEENT:  No diplopia or blurred vision.  No earache, sore throat or runny nose.  CARDIOVASCULAR:  No pressure, squeezing, strangling, tightness, heaviness or aching about the chest, neck, axilla or epigastrium.  RESPIRATORY:  No cough, shortness of breath  GASTROINTESTINAL:  No nausea, vomiting or diarrhea.  GENITOURINARY:  No dysuria, frequency or urgency. No Blood in urine  MUSCULOSKELETAL:  BACK PAIN  SKIN:  No change in skin, hair or nails.  NEUROLOGIC:  No Headaches, seizures or weakness.  PSYCHIATRIC:  No disorder of thought or mood.  ENDOCRINE:  No heat or cold intolerance  HEMATOLOGICAL:  No easy bruising or bleeding.   =======================================================  Allergies  iodine (Swelling)    ======================================================  Physical Exam:  ============  (see vitals section below)    General:  No acute distress.  FRAIL  Eye: Pupils are equal, round and reactive to light, Extraocular movements are intact, Normal conjunctiva.  HENT: Normocephalic, Oral mucosa is moist, No pharyngeal erythema, No sinus tenderness.  CONJUNCTIVAL PALLOR, NO HEMORRHAGES.   POOR DENTITION, WITH MISSING TEETH, GINGIVAL INFLAMMATION  Neck: Supple, No lymphadenopathy.  Respiratory: Lungs WITH FAIR AIR ENTRY  Cardiovascular: Normal rate, Regular rhythm,  HARSH SYSTOLIC MURMUR  Gastrointestinal: Soft, Non-tender, Non-distended, Normal bowel sounds.  Genitourinary: No costovertebral angle tenderness.  MINER WITH CLEAR URINE  Lymphatics: No lymphadenopathy neck,   Musculoskeletal: Normal range of motion, Normal strength.  Integumentary: No rash.  Neurologic: Alert, Oriented, No focal deficits, Cranial Nerves II-XII are grossly intact.  Psychiatric: Appropriate mood & affect.    =======================================================    Vitals:  ============  T(F): 98.3 (24 Feb 2020 08:04), Max: 98.4 (23 Feb 2020 16:40)  HR: 78 (24 Feb 2020 08:04)  BP: 132/67 (24 Feb 2020 08:04)  RR: 20 (24 Feb 2020 08:04)  SpO2: 95% (24 Feb 2020 08:04) (94% - 95%)  temp max in last 48H T(F): , Max: 98.4 (02-23-20 @ 16:40)    =======================================================  Current Antibiotics:  ampicillin  IVPB 2 Gram(s) IV Intermittent every 4 hours  cefTRIAXone   IVPB 2000 milliGRAM(s) IV Intermittent every 12 hours    Other medications:  atorvastatin 20 milliGRAM(s) Oral at bedtime  bicalutamide 50 milliGRAM(s) Oral daily  diphenhydrAMINE   Injectable 50 milliGRAM(s) IV Push once  enoxaparin Injectable 40 milliGRAM(s) SubCutaneous daily  escitalopram 5 milliGRAM(s) Oral daily  gabapentin 100 milliGRAM(s) Oral three times a day  metoprolol succinate ER 12.5 milliGRAM(s) Oral daily  tamsulosin 0.4 milliGRAM(s) Oral at bedtime      =======================================================  Labs:                        8.5    7.55  )-----------( 220      ( 23 Feb 2020 09:35 )             27.4       WBC Count: 7.55 K/uL (02-23-20 @ 09:35)  WBC Count: 4.66 K/uL (02-20-20 @ 10:43)      02-23    140  |  98  |  9.0  ----------------------------<  102<H>  3.6   |  29.0  |  0.65    Ca    8.6      23 Feb 2020 09:35  Phos  2.2     02-23  Mg     1.8     02-23        Culture - Blood (collected 02-21-20 @ 08:12)  Source: .Blood    Culture - Blood (collected 02-21-20 @ 08:12)  Source: .Blood    Culture - Blood (collected 02-20-20 @ 10:45)  Source: .Blood  Final Report (02-22-20 @ 11:30):    Growth in aerobic and anaerobic bottles: Enterococcus faecalis    Anaerobic Bottle: 23:02 Hours to positivity    Aerobic Bottle: 1 day; 03:13 Hours to positivity    ,    TYPE: (C=Critical, N=Notification, A=Abnormal) c    TESTS:  _ gs    DATE/TIME CALLED: _ 02/21/2020 10:47:55    CALLED TO: _ chadwick white rn    READ BACK (2 Patient Identifiers)(Y/N): _ y    READ BACK VALUES (Y/N): _ y    CALLED BY: Venus boss  Organism: Enterococcus faecalis (02-22-20 @ 11:30)  Organism: Enterococcus faecalis (02-22-20 @ 11:30)    Sensitivities:      -  Ampicillin: S <=2 Predicts results to ampicillin/sulbactam, amoxacillin-clavulanate and  piperacillin-tazobactam.      -  Gentamicin synergy: S      -  Streptomycin synergy: S      -  Vancomycin: S 1      Method Type: STEPHANIE    Culture - Blood (collected 02-20-20 @ 10:45)  Source: .Blood  Final Report (02-22-20 @ 11:21):    Growth in aerobic and anaerobic bottles: Enterococcus faecalis    Anaerobic Bottle: 23:01 Hours to positivity    Aerobic Bottle: 1 day; 03:20 Hours to positivity    ,    TYPE: (C=Critical, N=Notification, A=Abnormal) c    TESTS:  _ gs    DATE/TIME CALLED: _ 02/21/2020 10:50:22    CALLED TO: Venus white rn    READ BACK (2 Patient Identifiers)(Y/N): _ y    READ BACK VALUES (Y/N): _ y    CALLED BY: Venus boss  Organism: Enterococcus faecalis (02-22-20 @ 11:21)  Organism: Enterococcus faecalis (02-22-20 @ 11:21)    Sensitivities:      -  Ampicillin: S <=2 Predicts results to ampicillin/sulbactam, amoxacillin-clavulanate and  piperacillin-tazobactam.      -  Gentamicin synergy: S      -  Streptomycin synergy: S      -  Vancomycin: S 1      Method Type: STEPHANIE    Culture - Blood (collected 02-19-20 @ 14:22)  Source: .Blood  Final Report (02-21-20 @ 08:55):    Growth in aerobic and anaerobic bottles: Enterococcus faecalis    Aerobic Bottle: 20:26 Hours to positivity    Anaerobic Bottle: 24:26 Hours to positivity    .    TYPE: (C=Critical, N=Notification, A=Abnormal) C    TESTS:  _ Positive Blood GS    DATE/TIME CALLED: _ 02/20/2020 11:50    CALLED TO: Venus WHITAKER: Catalino Zhang RN    READ BACK (2 Patient Identifiers)(Y/N): _ Y    READ BACK VALUES (Y/N): _ Y    CALLED BY: Venus vincent  Organism: Enterococcus faecalis (02-21-20 @ 08:55)  Organism: Enterococcus faecalis (02-21-20 @ 08:55)    Sensitivities:      -  Ampicillin: S <=2 Predicts results to ampicillin/sulbactam, amoxacillin-clavulanate and  piperacillin-tazobactam.      -  Gentamicin synergy: S      -  Streptomycin synergy: S      -  Vancomycin: S 1      Method Type: STEPHANIE    Culture - Blood (collected 02-19-20 @ 14:22)  Source: .Blood  Final Report (02-21-20 @ 08:55):    Growth in aerobic and anaerobic bottles: Enterococcus faecalis    Anaerobic Bottle: 20:16 Hours to positivity    Aerobic Bottle: 21:16 Hours to positivity    .    TYPE: (C=Critical, N=Notification, A=Abnormal) C    TESTS:  _ Positive Blood GS    DATE/TIME CALLED: _ 02/20/2020 11:50    CALLED TO: _ OdellTWR: Catalino Zhang RN    READ BACK (2 Patient Identifiers)(Y/N): _ Y    READ BACK VALUES (Y/N): _ Y    CALLED BY: Venus vincent  Organism: Enterococcus faecalis (02-21-20 @ 08:55)  Organism: Enterococcus faecalis (02-21-20 @ 08:55)    Sensitivities:      -  Ampicillin: S <=2 Predicts results to ampicillin/sulbactam, amoxacillin-clavulanate and  piperacillin-tazobactam.      -  Gentamicin synergy: S      -  Streptomycin synergy: S      -  Vancomycin: S 1      Method Type: STEPHANIE    Culture - Urine (collected 02-19-20 @ 01:35)  Source: .Urine  Final Report (02-19-20 @ 21:52):    <10,000 CFU/mL Normal Urogenital Mandie    Culture - Blood (collected 02-18-20 @ 18:39)  Source: .Blood  Final Report (02-20-20 @ 11:27):    Growth in aerobic and anaerobic bottles: Enterococcus faecalis    Aerobic Bottle: 12:18 Hours to positivity    Anaerobic Bottle: 12:28 Hours to positivity    .    TYPE: (C=Critical, N=Notification, A=Abnormal) C    TESTS:  _ Positive Blood GS    DATE/TIME CALLED: _ 02/19/2020 10:10    CALLED TO: _ MARINO: Marian Schulte RN    READ BACK (2 Patient Identifiers)(Y/N): _ Y    READ BACK VALUES (Y/N): _ Y    CALLED BY: Venus vincent  Organism: Enterococcus faecalis (02-20-20 @ 11:27)  Organism: Enterococcus faecalis (02-20-20 @ 11:27)    Sensitivities:      -  Ampicillin: S <=2 Predicts results to ampicillin/sulbactam, amoxacillin-clavulanate and  piperacillin-tazobactam.      -  Gentamicin synergy: S      -  Streptomycin synergy: S      -  Vancomycin: S 2      Method Type: STEPHANIE    Culture - Blood (collected 02-18-20 @ 18:39)  Source: .Blood  Final Report (02-20-20 @ 11:28):    Growth in aerobic and anaerobic bottles: Enterococcus faecalis    Aerobic Bottle: 12:08 Hours to positivity    Anaerobic Bottle: 12:28 Hours to positivity    .    TYPE: (C=Critical, N=Notification, A=Abnormal) C    TESTS:  _ Positive Blood GS    DATE/TIME CALLED: _ 02/19/2020 10:10    CALLED TO: Venus RAUSCHHR: Marian Schulte RN    READ BACK (2 Patient Identifiers)(Y/N): _ Y    READ BACK VALUES (Y/N): _ Y    CALLED BY: Venus vincent  Organism: Enterococcus faecalis (02-20-20 @ 11:28)  Organism: Enterococcus faecalis (02-20-20 @ 11:28)    Sensitivities:      -  Ampicillin: S <=2 Predicts results to ampicillin/sulbactam, amoxacillin-clavulanate and  piperacillin-tazobactam.      -  Gentamicin synergy: S      -  Streptomycin synergy: S      -  Vancomycin: S 2      Method Type: STEPHANIE    Culture - Blood (collected 02-18-20 @ 00:50)  Source: .Blood  Final Report (02-19-20 @ 13:34):    Growth in aerobic and anaerobic bottles: Enterococcus faecalis    Anaerobic Bottle: 12:04 Hours to positivity    Aerobic Bottle: 13:08 Hours to positivity    ***Blood Panel PCR results on this specimen are available    approximately 3 hours after the Gram stain result.***    Gram stain, PCR, and/or culture results may not always    correspond due to difference in methodologies.    ************************************************************    This PCR assay was performed using [employer].    The following targets are tested for: Enterococcus,    vancomycin resistant enterococci, Listeria monocytogenes,    coagulase negative staphylococci, S. aureus,    methicillin resistant S. aureus, Streptococcus agalactiae    (Group B), S. pneumoniae, S. pyogenes (Group A),    Acinetobacter baumannii, Enterobacter cloacae, E. coli,    Klebsiella oxytoca, K. pneumoniae, Proteus sp.,    Serratia marcescens, Haemophilus influenzae,    Neisseria meningitidis, Pseudomonas aeruginosa, Candida    albicans, C. glabrata, C krusei, C parapsilosis,    C. tropicalis and the KPC resistance gene.    .    TYPE: (C=Critical, N=Notification, A=Abnormal)    C    TESTS:  _ bld     DATE/TIME CALLED: _ 02/18/2020 14:57:24    CALLED TO: Venus KINCAID    READBACK (2 Patient Identifiers)(Y/N): _ Y    READ BACK VALUES (Y/N): _ Y    CALLED BY: Venus mckeon  Organism: Blood Culture PCR  Enterococcus faecalis (02-19-20 @ 13:34)  Organism: Enterococcus faecalis (02-19-20 @ 13:34)    Sensitivities:      -  Ampicillin: S <=2 Predicts results to ampicillin/sulbactam, amoxacillin-clavulanate and  piperacillin-tazobactam.      -  Gentamicin synergy: S      -  Streptomycin synergy: S      -  Vancomycin: S 1      Method Type: STEPHANIE  Organism: Blood Culture PCR (02-19-20 @ 13:34)    Sensitivities:      -  Enterococcus species: Detec      Method Type: PCR    Culture - Blood (collected 02-18-20 @ 00:49)  Source: .Blood  Final Report (02-19-20 @ 13:36):    Growth in aerobic and anaerobic bottles: Enterococcus faecalis    Anaerobic Bottle: 12:04 Hours to positivity    Aerobic Bottle: 13:18 Hours to positivity    .    TYPE: (C=Critical, N=Notification, A=Abnormal) C    TESTS:  _ Bld     DATE/TIME CALLED: _ 02/18/2020 15:16:55    CALLED TO: Venus KINCAID    READ BACK (2 Patient Identifiers)(Y/N): _ Y    READ BACK VALUES (Y/N): _ Y    CALLED BY: Venus mckeon  Organism: Enterococcus faecalis (02-19-20 @ 13:36)  Organism: Enterococcus faecalis (02-19-20 @ 13:36)    Sensitivities:      -  Ampicillin: S <=2 Predicts results to ampicillin/sulbactam, amoxacillin-clavulanate and  piperacillin-tazobactam.      -  Gentamicin synergy: S      -  Streptomycin synergy: S      -  Vancomycin: S 1      Method Type: STEPHANIE    Culture - Urine (collected 02-17-20 @ 21:05)  Source: .Urine  Final Report (02-19-20 @ 22:11):    50,000 - 99,000 CFU/mL Enterococcus faecalis    10,000 - 49,000 CFU/mL Coag Negative Staphylococcus "Susceptibilities not    performed"  Organism: Enterococcus faecalis (02-19-20 @ 22:11)  Organism: Enterococcus faecalis (02-19-20 @ 22:11)    Sensitivities:      -  Ampicillin: S <=2 Predicts results to ampicillin/sulbactam, amoxacillin-clavulanate and  piperacillin-tazobactam.      -  Ciprofloxacin: S <=1      -  Levofloxacin: S <=1      -  Nitrofurantoin: S <=32 Should not be used to treat pyelonephritis.      -  Tetra/Doxy: R >8      -  Vancomycin: S 1      Method Type: STEPHANIE      Creatinine, Serum: 0.65 mg/dL (02-23-20 @ 09:35)  Creatinine, Serum: 0.63 mg/dL (02-20-20 @ 10:42)  C-Reactive Protein, Serum: 14.45 mg/dL (02-19-20 @ 05:51)  Sedimentation Rate, Erythrocyte: 55 mm/hr (02-19-20 @ 05:51)      =============        EXAM:  CT CHEST                        PROCEDURE DATE:  02/22/2020    INTERPRETATION:    PROCEDURE INFORMATION:   Exam: CT Chest Without Contrast   Exam date and time: 2/22/2020 8:39 PM   Age: 85 years old   Clinical indication: Abdominal pain; Chest pain   TECHNIQUE:   Imaging protocol: Computed tomography of the chest without contrast.   3D rendering: MIP and/or 3D reconstructed images were created by the   technologist.     COMPARISON:   DX XR CHEST PA AND LATERAL 2/19/2020 6:02 PM   FINDINGS:   Lungs: No pulmonary infiltrates or nodules. Peribronchial thickening involving the lower lobes.  Pleural space: Unremarkable. No pneumothorax. No pleural effusion.   Heart: Mild cardiomegaly. Trace pericardial fluid. Coronary artery calcification.  Aorta: Unremarkable. No aortic aneurysm. Mildly enlarged main pulmonary artery measuring 3.3 cm.  Lymph nodes: Unremarkable. No enlarged lymph nodes.   Bones/joints: Unremarkable. No acute fracture.   Soft tissues: Unremarkable.     IMPRESSION:   1. Mild cardiomegaly.   2. Trace pericardial effusion.   3. No pulmonary infiltrates or abscess.      =========================  PROCEDURE INFORMATION:   Exam: CT Abdomen And Pelvis Without Contrast   Exam date and time: 2/22/2020 8:39 PM   Age: 85 years old   Clinical indication: Abdominal pain; Chest pain     TECHNIQUE:   Imaging protocol: Computed tomography of the abdomen and pelvis without   contrast.   3D rendering: MIP and/or 3D reconstructed images were created by the   technologist.     COMPARISON: 02/17/2020.    FINDINGS:     Liver: Normal. No mass.   Gallbladder and bile ducts: Gallstone within the non distended noninflamed   gallbladder.   Pancreas: Normal. No ductal dilation.   Spleen: Normal. No splenomegaly.   Adrenals: Normal. No mass.   Kidneys and ureters: Bilateral renal cysts measuring up to 2.8 cm, otherwise   normal kidneys.   Stomach and bowel: Nonspecific distention. No obstruction. No mucosal thickening.   Appendix: Normal appendix.   Intraperitoneal space: Unremarkable. No free air. No significant fluid   collection.   Vasculature: Moderate atherosclerotic wall calcification abdominal aorta and   iliac vessels.   Lymph nodes: Unremarkable. No enlarged lymph nodes.     Bladder: Mild bladder wall thickening likely related to under distension though   underlying trabeculation or cystitis not excluded. Miner catheter in place. Mural calcification involving the urinary bladder. This could be related to chronic infection.  Reproductive: Mildly enlarged prostate measuring up to 5.3 cm in diameter   indenting bladder base.   Bones/joints: Right sacral insufficiency fracture, already reported on MR imaging of the spine. Multilevel degenerative disc disease and facet arthrosis with secondary spinal stenosis, most pronounced at L4-5 level.  Soft tissues: Unremarkable.     IMPRESSION:   1. No acute abdominal/pelvic CT findings.   2. Mildly enlarged prostate indenting bladder base.   3. Miner catheter within decompressed thick-walled bladder which may be   secondary to decompressed state or underlying trabeculation however is cystitis   not excluded. Please correlate with urinary analysis.      WICHO PADILLA M.D., ATTENDING RADIOLOGIST  This document has been electronically signed. Feb 23 2020  9:04AM

## 2020-02-25 LAB
ANION GAP SERPL CALC-SCNC: 8 MMOL/L — SIGNIFICANT CHANGE UP (ref 5–17)
BASOPHILS # BLD AUTO: 0.02 K/UL — SIGNIFICANT CHANGE UP (ref 0–0.2)
BASOPHILS NFR BLD AUTO: 0.2 % — SIGNIFICANT CHANGE UP (ref 0–2)
BUN SERPL-MCNC: 5 MG/DL — LOW (ref 8–20)
CALCIUM SERPL-MCNC: 8.5 MG/DL — LOW (ref 8.6–10.2)
CHLORIDE SERPL-SCNC: 100 MMOL/L — SIGNIFICANT CHANGE UP (ref 98–107)
CO2 SERPL-SCNC: 29 MMOL/L — SIGNIFICANT CHANGE UP (ref 22–29)
CREAT SERPL-MCNC: 0.59 MG/DL — SIGNIFICANT CHANGE UP (ref 0.5–1.3)
EOSINOPHIL # BLD AUTO: 0.42 K/UL — SIGNIFICANT CHANGE UP (ref 0–0.5)
EOSINOPHIL NFR BLD AUTO: 4.9 % — SIGNIFICANT CHANGE UP (ref 0–6)
GLUCOSE SERPL-MCNC: 87 MG/DL — SIGNIFICANT CHANGE UP (ref 70–99)
HCT VFR BLD CALC: 28.7 % — LOW (ref 39–50)
HGB BLD-MCNC: 8.5 G/DL — LOW (ref 13–17)
IMM GRANULOCYTES NFR BLD AUTO: 0.8 % — SIGNIFICANT CHANGE UP (ref 0–1.5)
LYMPHOCYTES # BLD AUTO: 1.53 K/UL — SIGNIFICANT CHANGE UP (ref 1–3.3)
LYMPHOCYTES # BLD AUTO: 18 % — SIGNIFICANT CHANGE UP (ref 13–44)
MAGNESIUM SERPL-MCNC: 1.7 MG/DL — LOW (ref 1.8–2.6)
MCHC RBC-ENTMCNC: 24.9 PG — LOW (ref 27–34)
MCHC RBC-ENTMCNC: 29.6 GM/DL — LOW (ref 32–36)
MCV RBC AUTO: 84.2 FL — SIGNIFICANT CHANGE UP (ref 80–100)
MONOCYTES # BLD AUTO: 0.51 K/UL — SIGNIFICANT CHANGE UP (ref 0–0.9)
MONOCYTES NFR BLD AUTO: 6 % — SIGNIFICANT CHANGE UP (ref 2–14)
NEUTROPHILS # BLD AUTO: 5.95 K/UL — SIGNIFICANT CHANGE UP (ref 1.8–7.4)
NEUTROPHILS NFR BLD AUTO: 70.1 % — SIGNIFICANT CHANGE UP (ref 43–77)
PHOSPHATE SERPL-MCNC: 2.2 MG/DL — LOW (ref 2.4–4.7)
PLATELET # BLD AUTO: 179 K/UL — SIGNIFICANT CHANGE UP (ref 150–400)
POTASSIUM SERPL-MCNC: 3.8 MMOL/L — SIGNIFICANT CHANGE UP (ref 3.5–5.3)
POTASSIUM SERPL-SCNC: 3.8 MMOL/L — SIGNIFICANT CHANGE UP (ref 3.5–5.3)
RBC # BLD: 3.41 M/UL — LOW (ref 4.2–5.8)
RBC # FLD: 17.6 % — HIGH (ref 10.3–14.5)
SODIUM SERPL-SCNC: 137 MMOL/L — SIGNIFICANT CHANGE UP (ref 135–145)
WBC # BLD: 8.5 K/UL — SIGNIFICANT CHANGE UP (ref 3.8–10.5)
WBC # FLD AUTO: 8.5 K/UL — SIGNIFICANT CHANGE UP (ref 3.8–10.5)

## 2020-02-25 PROCEDURE — 99232 SBSQ HOSP IP/OBS MODERATE 35: CPT

## 2020-02-25 PROCEDURE — 71045 X-RAY EXAM CHEST 1 VIEW: CPT | Mod: 26

## 2020-02-25 RX ORDER — CHLORHEXIDINE GLUCONATE 213 G/1000ML
1 SOLUTION TOPICAL
Refills: 0 | Status: DISCONTINUED | OUTPATIENT
Start: 2020-02-25 | End: 2020-02-26

## 2020-02-25 RX ORDER — SODIUM CHLORIDE 9 MG/ML
10 INJECTION INTRAMUSCULAR; INTRAVENOUS; SUBCUTANEOUS
Refills: 0 | Status: DISCONTINUED | OUTPATIENT
Start: 2020-02-25 | End: 2020-02-26

## 2020-02-25 RX ADMIN — ESCITALOPRAM OXALATE 5 MILLIGRAM(S): 10 TABLET, FILM COATED ORAL at 12:07

## 2020-02-25 RX ADMIN — TAMSULOSIN HYDROCHLORIDE 0.4 MILLIGRAM(S): 0.4 CAPSULE ORAL at 21:23

## 2020-02-25 RX ADMIN — CEFTRIAXONE 100 MILLIGRAM(S): 500 INJECTION, POWDER, FOR SOLUTION INTRAMUSCULAR; INTRAVENOUS at 05:09

## 2020-02-25 RX ADMIN — Medication 216 GRAM(S): at 08:23

## 2020-02-25 RX ADMIN — BICALUTAMIDE 50 MILLIGRAM(S): 50 TABLET, FILM COATED ORAL at 14:01

## 2020-02-25 RX ADMIN — GABAPENTIN 100 MILLIGRAM(S): 400 CAPSULE ORAL at 21:23

## 2020-02-25 RX ADMIN — GABAPENTIN 100 MILLIGRAM(S): 400 CAPSULE ORAL at 14:00

## 2020-02-25 RX ADMIN — Medication 250 MILLIGRAM(S): at 05:09

## 2020-02-25 RX ADMIN — Medication 216 GRAM(S): at 12:08

## 2020-02-25 RX ADMIN — ENOXAPARIN SODIUM 40 MILLIGRAM(S): 100 INJECTION SUBCUTANEOUS at 12:08

## 2020-02-25 RX ADMIN — ATORVASTATIN CALCIUM 20 MILLIGRAM(S): 80 TABLET, FILM COATED ORAL at 21:23

## 2020-02-25 RX ADMIN — Medication 216 GRAM(S): at 20:06

## 2020-02-25 RX ADMIN — Medication 12.5 MILLIGRAM(S): at 05:09

## 2020-02-25 RX ADMIN — GABAPENTIN 100 MILLIGRAM(S): 400 CAPSULE ORAL at 05:09

## 2020-02-25 RX ADMIN — Medication 216 GRAM(S): at 04:23

## 2020-02-25 RX ADMIN — Medication 216 GRAM(S): at 16:28

## 2020-02-25 RX ADMIN — CEFTRIAXONE 100 MILLIGRAM(S): 500 INJECTION, POWDER, FOR SOLUTION INTRAMUSCULAR; INTRAVENOUS at 18:06

## 2020-02-25 NOTE — PROCEDURE NOTE - NSPOSTPRCRAD_GEN_A_CORE
ultrasound/line adjusted to depth of insertion/line in appropriate postion/depth of insertion/postion of catheter/chest radiograph

## 2020-02-25 NOTE — PROGRESS NOTE ADULT - ASSESSMENT
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 ID continues to follow the pt, thus far B cx 2/21 and 2/22 negative >48 hours. Pt to remain on rocephin and ampicillin for anticipated 4 weeks, picc line ordered by ID. Pt also with acute on chronic lower back pain, MR of spine showed suspected sacral fracture and spinal stenosis, pt optimized with pain control. Slow clinical improvement.     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 Tran the plan of care     Chronic morrison due to retention / h/o prostate cancer   -replaced on admission  -c/w flomax po qhs   - c/w casodex po qd   - c/w morrison catheter care      Anemia of cronic disease   -h/h stable   - s/p venofer x3 days   -c/w ferrous sulfate and folic acid     Severe MR   -NESHA shows Flail PMVL noted likely from progression of prior known MVP  -When cleared from ID and likely as an outpt to f/u with cardio for MVR evaluation   -cardiology  follow up noted and appreciated     Atrial  fib   -rate controlled   -off anticoagulation due to hematuria   -cont metoprolol  po qd     HTN  - bp stable  - c/w metoprolol po qd     HLD  - c/w atorvastain po qhs     Chronic back pain / spinal stenosis / sacral farcture   -pain controlled   - c/w gabapentin   - c/w oxycodone prn   - PT consult noted     Depression   c/w lexapro po qd     Severe protein fela malnutrition   - c/w ensure po tid   - nutirition consult noted and appreciated     DVT ppx  - c/w lovenox sq qd     Updated wife at bedside in regards to the plan of care     Dispo: PT evaluation noted, d/w CM and MANISHA sent for FREEMAN placement. Picc line to  be placed today. Anticipated discharge in 24 hours.

## 2020-02-25 NOTE — PROCEDURE NOTE - NSICDXPROCEDURE_GEN_ALL_CORE_FT
PROCEDURES:  Placement, PICC, with imaging guidance 25-Feb-2020 13:44:09 4FR 34CM  20[facility] [employer] POWER PICC LINE good heme back ns flush right basilic vein Ihsan Vargas

## 2020-02-25 NOTE — PROCEDURE NOTE - NSPROCDETAILS_GEN_ALL_CORE
ultrasound guidance/ultrasound assessment/supine position/location identified, draped/prepped, sterile technique used/sterile dressing applied/sterile technique, catheter placed

## 2020-02-25 NOTE — PROGRESS NOTE ADULT - SUBJECTIVE AND OBJECTIVE BOX
[facility]  INFECTIOUS DISEASES AND INTERNAL MEDICINE at [place]  =======================================================  Lisandro Russell MD  Diplomates American Board of Internal Medicine and Infectious Diseases  Telephone [phone]  Fax            [phone]  =======================================================    [id number]  CLIFFORD DOSSE   follow up: enterococcus bacteremia    labs reviewed; 16/16 bottles positive blood cx for enterococcus faecalis  SS to ampicillin  most recent culture  in process, negative from 2/21 and 2/22/2020 thus far    no new events.    =======================================================    REVIEW OF SYSTEMS:  CONSTITUTIONAL:  FATIGUE    HEENT:  No diplopia or blurred vision.  No earache, sore throat or runny nose.  CARDIOVASCULAR:  No pressure, squeezing, strangling, tightness, heaviness or aching about the chest, neck, axilla or epigastrium.  RESPIRATORY:  No cough, shortness of breath  GASTROINTESTINAL:  No nausea, vomiting or diarrhea.  GENITOURINARY:  No dysuria, frequency or urgency. No Blood in urine  MUSCULOSKELETAL:  BACK PAIN  SKIN:  No change in skin, hair or nails.  NEUROLOGIC:  No Headaches, seizures or weakness.  PSYCHIATRIC:  No disorder of thought or mood.  ENDOCRINE:  No heat or cold intolerance  HEMATOLOGICAL:  No easy bruising or bleeding.   =======================================================  Allergies  iodine (Swelling)    ======================================================  Physical Exam:  ============  (see vitals section below)    General:  No acute distress.  FRAIL  Eye: Pupils are equal, round and reactive to light, Extraocular movements are intact, Normal conjunctiva.  HENT: Normocephalic, Oral mucosa is moist, No pharyngeal erythema, No sinus tenderness.  CONJUNCTIVAL PALLOR, NO HEMORRHAGES.   POOR DENTITION, WITH MISSING TEETH, GINGIVAL INFLAMMATION  Neck: Supple, No lymphadenopathy.  Respiratory: Lungs WITH FAIR AIR ENTRY  Cardiovascular: Normal rate, Regular rhythm,  HARSH SYSTOLIC MURMUR  Gastrointestinal: Soft, Non-tender, Non-distended, Normal bowel sounds.  Genitourinary: No costovertebral angle tenderness.  MINER WITH CLEAR URINE  Lymphatics: No lymphadenopathy neck,   Musculoskeletal: Normal range of motion, Normal strength.  Integumentary: No rash.  Neurologic: Alert, Oriented, No focal deficits, Cranial Nerves II-XII are grossly intact.  Psychiatric: Appropriate mood & affect.    =======================================================     Vitals:  ============  T(F): 98.1 (25 Feb 2020 07:43), Max: 98.4 (24 Feb 2020 15:03)  HR: 63 (25 Feb 2020 07:43)  BP: 126/70 (25 Feb 2020 07:43)  RR: 18 (25 Feb 2020 07:43)  SpO2: 97% (25 Feb 2020 07:43) (93% - 97%)  temp max in last 48H T(F): , Max: 98.4 (02-23-20 @ 16:40)    =======================================================  Current Antibiotics:  ampicillin  IVPB 2 Gram(s) IV Intermittent every 4 hours  cefTRIAXone   IVPB 2000 milliGRAM(s) IV Intermittent every 12 hours    Other medications:  atorvastatin 20 milliGRAM(s) Oral at bedtime  bicalutamide 50 milliGRAM(s) Oral daily  enoxaparin Injectable 40 milliGRAM(s) SubCutaneous daily  escitalopram 5 milliGRAM(s) Oral daily  gabapentin 100 milliGRAM(s) Oral three times a day  metoprolol succinate ER 12.5 milliGRAM(s) Oral daily  saccharomyces boulardii 250 milliGRAM(s) Oral two times a day  tamsulosin 0.4 milliGRAM(s) Oral at bedtime      =======================================================  Labs:                        8.5    8.50  )-----------( 179      ( 25 Feb 2020 09:01 )             28.7       WBC Count: 8.50 K/uL (02-25-20 @ 09:01)  WBC Count: 7.55 K/uL (02-23-20 @ 09:35)      02-25    137  |  100  |  5.0<L>  ----------------------------<  87  3.8   |  29.0  |  0.59    Ca    8.5<L>      25 Feb 2020 09:01  Phos  2.2     02-25  Mg     1.7     02-25        Culture - Blood (collected 02-22-20 @ 11:44)  Source: .Blood    Culture - Blood (collected 02-22-20 @ 11:44)  Source: .Blood    Culture - Blood (collected 02-21-20 @ 08:12)  Source: .Blood    Culture - Blood (collected 02-21-20 @ 08:12)  Source: .Blood    Culture - Blood (collected 02-20-20 @ 10:45)  Source: .Blood  Final Report (02-22-20 @ 11:30):    Growth in aerobic and anaerobic bottles: Enterococcus faecalis    Anaerobic Bottle: 23:02 Hours to positivity    Aerobic Bottle: 1 day; 03:13 Hours to positivity    ,    TYPE: (C=Critical, N=Notification, A=Abnormal) c    TESTS:  _ gs    DATE/TIME CALLED: _ 02/21/2020 10:47:55    CALLED TO: Venus white rn    READ BACK (2 Patient Identifiers)(Y/N): _ y    READ BACK VALUES (Y/N): _ y    CALLED BY: Venus boss  Organism: Enterococcus faecalis (02-22-20 @ 11:30)  Organism: Enterococcus faecalis (02-22-20 @ 11:30)    Sensitivities:      -  Ampicillin: S <=2 Predicts results to ampicillin/sulbactam, amoxacillin-clavulanate and  piperacillin-tazobactam.      -  Gentamicin synergy: S      -  Streptomycin synergy: S      -  Vancomycin: S 1      Method Type: STEPHANIE    Culture - Blood (collected 02-20-20 @ 10:45)  Source: .Blood  Final Report (02-22-20 @ 11:21):    Growth in aerobic and anaerobic bottles: Enterococcus faecalis    Anaerobic Bottle: 23:01 Hours to positivity    Aerobic Bottle: 1 day; 03:20 Hours to positivity    ,    TYPE: (C=Critical, N=Notification, A=Abnormal) c    TESTS:  _ gs    DATE/TIME CALLED: _ 02/21/2020 10:50:22    CALLED TO: Venus white rn    READ BACK (2 Patient Identifiers)(Y/N): _ y    READ BACK VALUES (Y/N): _ y    CALLED BY: Venus boss  Organism: Enterococcus faecalis (02-22-20 @ 11:21)  Organism: Enterococcus faecalis (02-22-20 @ 11:21)    Sensitivities:      -  Ampicillin: S <=2 Predicts results to ampicillin/sulbactam, amoxacillin-clavulanate and  piperacillin-tazobactam.      -  Gentamicin synergy: S      -  Streptomycin synergy: S      -  Vancomycin: S 1      Method Type: STEPHANIE    Culture - Blood (collected 02-19-20 @ 14:22)  Source: .Blood  Final Report (02-21-20 @ 08:55):    Growth in aerobic and anaerobic bottles: Enterococcus faecalis    Aerobic Bottle: 20:26 Hours to positivity    Anaerobic Bottle: 24:26 Hours to positivity    .    TYPE: (C=Critical, N=Notification, A=Abnormal) C    TESTS:  _ Positive Blood GS    DATE/TIME CALLED: _ 02/20/2020 11:50    CALLED TO: _ OdellTWR: Catalino Leatha RN    READ BACK (2 Patient Identifiers)(Y/N): _ Y    READ BACK VALUES (Y/N): _ Y    CALLED BY: Venus vincent  Organism: Enterococcus faecalis (02-21-20 @ 08:55)  Organism: Enterococcus faecalis (02-21-20 @ 08:55)    Sensitivities:      -  Ampicillin: S <=2 Predicts results to ampicillin/sulbactam, amoxacillin-clavulanate and  piperacillin-tazobactam.      -  Gentamicin synergy: S      -  Streptomycin synergy: S      -  Vancomycin: S 1      Method Type: STEPHANIE    Culture - Blood (collected 02-19-20 @ 14:22)  Source: .Blood  Final Report (02-21-20 @ 08:55):    Growth in aerobic and anaerobic bottles: Enterococcus faecalis    Anaerobic Bottle: 20:16 Hours to positivity    Aerobic Bottle: 21:16 Hours to positivity    .    TYPE: (C=Critical, N=Notification, A=Abnormal) C    TESTS:  _ Positive Blood GS    DATE/TIME CALLED: _ 02/20/2020 11:50    CALLED TO: _ 6TWR: Catalino Zhang RN    READ BACK (2 Patient Identifiers)(Y/N): _ Y    READ BACK VALUES (Y/N): _ Y    CALLED BY: Venus vincent  Organism: Enterococcus faecalis (02-21-20 @ 08:55)  Organism: Enterococcus faecalis (02-21-20 @ 08:55)    Sensitivities:      -  Ampicillin: S <=2 Predicts results to ampicillin/sulbactam, amoxacillin-clavulanate and  piperacillin-tazobactam.      -  Gentamicin synergy: S      -  Streptomycin synergy: S      -  Vancomycin: S 1      Method Type: STEPHANIE    Culture - Urine (collected 02-19-20 @ 01:35)  Source: .Urine  Final Report (02-19-20 @ 21:52):    <10,000 CFU/mL Normal Urogenital Mandie    Culture - Blood (collected 02-18-20 @ 18:39)  Source: .Blood  Final Report (02-20-20 @ 11:27):    Growth in aerobic and anaerobic bottles: Enterococcus faecalis    Aerobic Bottle: 12:18 Hours to positivity    Anaerobic Bottle: 12:28 Hours to positivity    .    TYPE: (C=Critical, N=Notification, A=Abnormal) C    TESTS:  _ Positive Blood GS    DATE/TIME CALLED: _ 02/19/2020 10:10    CALLED TO: _ ERHR: Marian Schulte RN    READ BACK (2 Patient Identifiers)(Y/N): _ Y    READ BACK VALUES (Y/N): _ Y    CALLED BY: Venus vincent  Organism: Enterococcus faecalis (02-20-20 @ 11:27)  Organism: Enterococcus faecalis (02-20-20 @ 11:27)    Sensitivities:      -  Ampicillin: S <=2 Predicts results to ampicillin/sulbactam, amoxacillin-clavulanate and  piperacillin-tazobactam.      -  Gentamicin synergy: S      -  Streptomycin synergy: S      -  Vancomycin: S 2      Method Type: STEPHANIE    Culture - Blood (collected 02-18-20 @ 18:39)  Source: .Blood  Final Report (02-20-20 @ 11:28):    Growth in aerobic and anaerobic bottles: Enterococcus faecalis    Aerobic Bottle: 12:08 Hours to positivity    Anaerobic Bottle: 12:28 Hours to positivity    .    TYPE: (C=Critical, N=Notification, A=Abnormal) C    TESTS:  _ Positive Blood GS    DATE/TIME CALLED: _ 02/19/2020 10:10    CALLED TO: _ ERHR: Marian Schulte RN    READ BACK (2 Patient Identifiers)(Y/N): _ Y    READ BACK VALUES (Y/N): _ Y    CALLED BY: Venus vincent  Organism: Enterococcus faecalis (02-20-20 @ 11:28)  Organism: Enterococcus faecalis (02-20-20 @ 11:28)    Sensitivities:      -  Ampicillin: S <=2 Predicts results to ampicillin/sulbactam, amoxacillin-clavulanate and  piperacillin-tazobactam.      -  Gentamicin synergy: S      -  Streptomycin synergy: S      -  Vancomycin: S 2      Method Type: STEPHANIE    Culture - Blood (collected 02-18-20 @ 00:50)  Source: .Blood  Final Report (02-19-20 @ 13:34):    Growth in aerobic and anaerobic bottles: Enterococcus faecalis    Anaerobic Bottle: 12:04 Hours to positivity    Aerobic Bottle: 13:08 Hours to positivity    ***Blood Panel PCR results on this specimen are available    approximately 3 hours after the Gram stain result.***    Gram stain, PCR, and/or culture results may not always    correspond due to difference in methodologies.    ************************************************************    This PCR assay was performed using [employer].    The following targets are tested for: Enterococcus,    vancomycin resistant enterococci, Listeria monocytogenes,    coagulase negative staphylococci, S. aureus,    methicillin resistant S. aureus, Streptococcus agalactiae    (Group B), S. pneumoniae, S. pyogenes (Group A),    Acinetobacter baumannii, Enterobacter cloacae, E. coli,    Klebsiella oxytoca, K. pneumoniae, Proteus sp.,    Serratia marcescens, Haemophilus influenzae,    Neisseria meningitidis, Pseudomonas aeruginosa, Candida    albicans, C. glabrata, C krusei, C parapsilosis,    C. tropicalis and the KPC resistance gene.    .    TYPE: (C=Critical, N=Notification, A=Abnormal)    C    TESTS:  _ bld gs    DATE/TIME CALLED: _ 02/18/2020 14:57:24    CALLED TO: Venus KINCAID    READBACK (2 Patient Identifiers)(Y/N): _ Y    READ BACK VALUES (Y/N): _ Y    CALLED BY: Venus mckeon  Organism: Blood Culture PCR  Enterococcus faecalis (02-19-20 @ 13:34)  Organism: Enterococcus faecalis (02-19-20 @ 13:34)    Sensitivities:      -  Ampicillin: S <=2 Predicts results to ampicillin/sulbactam, amoxacillin-clavulanate and  piperacillin-tazobactam.      -  Gentamicin synergy: S      -  Streptomycin synergy: S      -  Vancomycin: S 1      Method Type: STEPHANIE  Organism: Blood Culture PCR (02-19-20 @ 13:34)    Sensitivities:      -  Enterococcus species: Detec      Method Type: PCR    Culture - Blood (collected 02-18-20 @ 00:49)  Source: .Blood  Final Report (02-19-20 @ 13:36):    Growth in aerobic and anaerobic bottles: Enterococcus faecalis    Anaerobic Bottle: 12:04 Hours to positivity    Aerobic Bottle: 13:18 Hours to positivity    .    TYPE: (C=Critical, N=Notification, A=Abnormal) C    TESTS:  _ Bld gs    DATE/TIME CALLED: _ 02/18/2020 15:16:55    CALLED TO: Venus KINCAID    READ BACK (2 Patient Identifiers)(Y/N): _ Y    READ BACK VALUES (Y/N): _ Y    CALLED BY: Venus mckeon  Organism: Enterococcus faecalis (02-19-20 @ 13:36)  Organism: Enterococcus faecalis (02-19-20 @ 13:36)    Sensitivities:      -  Ampicillin: S <=2 Predicts results to ampicillin/sulbactam, amoxacillin-clavulanate and  piperacillin-tazobactam.      -  Gentamicin synergy: S      -  Streptomycin synergy: S      -  Vancomycin: S 1      Method Type: STEPHANIE    Culture - Urine (collected 02-17-20 @ 21:05)  Source: .Urine  Final Report (02-19-20 @ 22:11):    50,000 - 99,000 CFU/mL Enterococcus faecalis    10,000 - 49,000 CFU/mL Coag Negative Staphylococcus "Susceptibilities not    performed"  Organism: Enterococcus faecalis (02-19-20 @ 22:11)  Organism: Enterococcus faecalis (02-19-20 @ 22:11)    Sensitivities:      -  Ampicillin: S <=2 Predicts results to ampicillin/sulbactam, amoxacillin-clavulanate and  piperacillin-tazobactam.      -  Ciprofloxacin: S <=1      -  Levofloxacin: S <=1      -  Nitrofurantoin: S <=32 Should not be used to treat pyelonephritis.      -  Tetra/Doxy: R >8      -  Vancomycin: S 1      Method Type: STEPHANIE      Creatinine, Serum: 0.59 mg/dL (02-25-20 @ 09:01)  Creatinine, Serum: 0.65 mg/dL (02-23-20 @ 09:35)    C-Reactive Protein, Serum: 14.45 mg/dL (02-19-20 @ 05:51)    Sedimentation Rate, Erythrocyte: 55 mm/hr (02-19-20 @ 05:51)        =============        EXAM:  CT CHEST                        PROCEDURE DATE:  02/22/2020    INTERPRETATION:    PROCEDURE INFORMATION:   Exam: CT Chest Without Contrast   Exam date and time: 2/22/2020 8:39 PM   Age: 85 years old   Clinical indication: Abdominal pain; Chest pain   TECHNIQUE:   Imaging protocol: Computed tomography of the chest without contrast.   3D rendering: MIP and/or 3D reconstructed images were created by the   technologist.     COMPARISON:   DX XR CHEST PA AND LATERAL 2/19/2020 6:02 PM   FINDINGS:   Lungs: No pulmonary infiltrates or nodules. Peribronchial thickening involving the lower lobes.  Pleural space: Unremarkable. No pneumothorax. No pleural effusion.   Heart: Mild cardiomegaly. Trace pericardial fluid. Coronary artery calcification.  Aorta: Unremarkable. No aortic aneurysm. Mildly enlarged main pulmonary artery measuring 3.3 cm.  Lymph nodes: Unremarkable. No enlarged lymph nodes.   Bones/joints: Unremarkable. No acute fracture.   Soft tissues: Unremarkable.     IMPRESSION:   1. Mild cardiomegaly.   2. Trace pericardial effusion.   3. No pulmonary infiltrates or abscess.      =========================  PROCEDURE INFORMATION:   Exam: CT Abdomen And Pelvis Without Contrast   Exam date and time: 2/22/2020 8:39 PM   Age: 85 years old   Clinical indication: Abdominal pain; Chest pain     TECHNIQUE:   Imaging protocol: Computed tomography of the abdomen and pelvis without   contrast.   3D rendering: MIP and/or 3D reconstructed images were created by the   technologist.     COMPARISON: 02/17/2020.    FINDINGS:     Liver: Normal. No mass.   Gallbladder and bile ducts: Gallstone within the non distended noninflamed   gallbladder.   Pancreas: Normal. No ductal dilation.   Spleen: Normal. No splenomegaly.   Adrenals: Normal. No mass.   Kidneys and ureters: Bilateral renal cysts measuring up to 2.8 cm, otherwise   normal kidneys.   Stomach and bowel: Nonspecific distention. No obstruction. No mucosal thickening.   Appendix: Normal appendix.   Intraperitoneal space: Unremarkable. No free air. No significant fluid   collection.   Vasculature: Moderate atherosclerotic wall calcification abdominal aorta and   iliac vessels.   Lymph nodes: Unremarkable. No enlarged lymph nodes.     Bladder: Mild bladder wall thickening likely related to under distension though   underlying trabeculation or cystitis not excluded. Miner catheter in place. Mural calcification involving the urinary bladder. This could be related to chronic infection.  Reproductive: Mildly enlarged prostate measuring up to 5.3 cm in diameter   indenting bladder base.   Bones/joints: Right sacral insufficiency fracture, already reported on MR imaging of the spine. Multilevel degenerative disc disease and facet arthrosis with secondary spinal stenosis, most pronounced at L4-5 level.  Soft tissues: Unremarkable.     IMPRESSION:   1. No acute abdominal/pelvic CT findings.   2. Mildly enlarged prostate indenting bladder base.   3. Miner catheter within decompressed thick-walled bladder which may be   secondary to decompressed state or underlying trabeculation however is cystitis   not excluded. Please correlate with urinary analysis.      WICHO PADILLA M.D., ATTENDING RADIOLOGIST  This document has been electronically signed. Feb 23 2020  9:04AM

## 2020-02-25 NOTE — PROGRESS NOTE ADULT - ASSESSMENT
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     Impression:  High grade bacteremia  Enterococcus species bacteremia  Mitral regurgitation  lumbar pain  fevers at home    Plan:  MULTIPLE 16/16 culture bottles positive so far  repeat cultures in process from 2/21 and 2/22 negative    NO obvious vegetation on RONALD, flail mitral leaflet  with severe mitral regurg   - ESR and CRP  are BOTH ELEVATED    Clinical symptomatology favors endovascular infection, i.e. endocarditis.    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       - follow up all outstanding cultures  - trend temperature and WBC curve  - repeat cultures from blood and all sources if febrile.

## 2020-02-25 NOTE — PROCEDURE NOTE - NSPOSTCAREGUIDE_GEN_A_CORE
Keep the cast/splint/dressing clean and dry/Verbal/written post procedure instructions were given to patient/caregiver/Instructed patient/caregiver to follow-up with primary care physician/Care for catheter as per unit/ICU protocols/Instructed patient/caregiver regarding signs and symptoms of infection

## 2020-02-25 NOTE — PROCEDURE NOTE - NSINFORMCONSENT_GEN_A_CORE
Benefits, risks, and possible complications of procedure explained to patient/caregiver who verbalized understanding and gave written consent./cyndie emmanuel

## 2020-02-25 NOTE — PROGRESS NOTE ADULT - SUBJECTIVE AND OBJECTIVE BOX
Patient is a 85y old  Male who presents with a chief complaint of Enterococcus bacteremia, UTI (24 Feb 2020 14:48) pending italo and picc line       HEALTH ISSUES - PROBLEM Dx:  Urinary tract infection without hematuria, site unspecified: Urinary tract infection without hematuria, site unspecified  Bladder calculi: Bladder calculi  Prostate cancer: Prostate cancer    INTERVAL HPI/OVERNIGHT EVENTS:  Patient seen and examined at bedside. No acute events overnight. Patient states he is feeling well, him and his wife are happy that the B cx are finally negative. He understands he will have picc line placed. Also wife and him are in agreement for ITALO. D/w them CM will initiate process for MANISHA. Patient denies chest pain, SOB, abd pain, N/V, fever, chills, dysuria or any other complaints. All remainder ROS negative.     Vital Signs Last 24 Hrs  T(C): 36.7 (25 Feb 2020 07:43), Max: 36.9 (24 Feb 2020 15:03)  T(F): 98.1 (25 Feb 2020 07:43), Max: 98.4 (24 Feb 2020 15:03)  HR: 63 (25 Feb 2020 07:43) (63 - 76)  BP: 126/70 (25 Feb 2020 07:43) (107/60 - 145/66)  BP(mean): --  RR: 18 (25 Feb 2020 07:43) (18 - 18)  SpO2: 97% (25 Feb 2020 07:43) (93% - 97%)    I&O's Summary    24 Feb 2020 07:01  -  25 Feb 2020 07:00  --------------------------------------------------------  IN: 0 mL / OUT: 2300 mL / NET: -2300 mL    CONSTITUTIONAL: Well appearing, well nourished, awake, alert and in no apparent distress  CARDIAC: Normal rate, regular rhythm.  Heart sounds S1, S2.  No murmurs, rubs or gallops   RESPIRATORY: Breath sounds clear and equal bilaterally. No wheezes, rhales or rhonchi  GASTROENTEROLOGY: soft nt nd bs +normoactive   EXTREMITIES: No edema, cyanosis or deformity   NEUROLOGICAL: Alert and oriented, no focal deficits, no motor or sensory deficits.  Silva +ve clear urine draining   SKIN: No rash, skin turgor wnl     MEDICATIONS  (STANDING):  ampicillin  IVPB 2 Gram(s) IV Intermittent every 4 hours  atorvastatin 20 milliGRAM(s) Oral at bedtime  bicalutamide 50 milliGRAM(s) Oral daily  cefTRIAXone   IVPB 2000 milliGRAM(s) IV Intermittent every 12 hours  chlorhexidine 2% Cloths 1 Application(s) Topical <User Schedule>  enoxaparin Injectable 40 milliGRAM(s) SubCutaneous daily  escitalopram 5 milliGRAM(s) Oral daily  gabapentin 100 milliGRAM(s) Oral three times a day  metoprolol succinate ER 12.5 milliGRAM(s) Oral daily  saccharomyces boulardii 250 milliGRAM(s) Oral two times a day  tamsulosin 0.4 milliGRAM(s) Oral at bedtime    MEDICATIONS  (PRN):  acetaminophen   Tablet .. 650 milliGRAM(s) Oral every 6 hours PRN Temp greater or equal to 38C (100.4F), Mild Pain (1 - 3), Moderate Pain (4 - 6)  oxyCODONE    IR 5 milliGRAM(s) Oral every 6 hours PRN Severe Pain (7 - 10)  sodium chloride 0.9% lock flush 10 milliLiter(s) IV Push every 1 hour PRN Pre/post blood products, medications, blood draw, and to maintain line patency      LABS:                        8.5    8.50  )-----------( 179      ( 25 Feb 2020 09:01 )             28.7     02-25    137  |  100  |  5.0<L>  ----------------------------<  87  3.8   |  29.0  |  0.59    Ca    8.5<L>      25 Feb 2020 09:01  Phos  2.2     02-25  Mg     1.7     02-25              RADIOLOGY & ADDITIONAL TESTS:  No new imaging studies

## 2020-02-26 ENCOUNTER — TRANSCRIPTION ENCOUNTER (OUTPATIENT)
Age: 85
End: 2020-02-26

## 2020-02-26 VITALS — HEART RATE: 83 BPM | SYSTOLIC BLOOD PRESSURE: 121 MMHG | DIASTOLIC BLOOD PRESSURE: 64 MMHG

## 2020-02-26 LAB
CULTURE RESULTS: SIGNIFICANT CHANGE UP
CULTURE RESULTS: SIGNIFICANT CHANGE UP
SPECIMEN SOURCE: SIGNIFICANT CHANGE UP
SPECIMEN SOURCE: SIGNIFICANT CHANGE UP

## 2020-02-26 PROCEDURE — 84100 ASSAY OF PHOSPHORUS: CPT

## 2020-02-26 PROCEDURE — 71045 X-RAY EXAM CHEST 1 VIEW: CPT

## 2020-02-26 PROCEDURE — 99232 SBSQ HOSP IP/OBS MODERATE 35: CPT

## 2020-02-26 PROCEDURE — 86900 BLOOD TYPING SEROLOGIC ABO: CPT

## 2020-02-26 PROCEDURE — 36415 COLL VENOUS BLD VENIPUNCTURE: CPT

## 2020-02-26 PROCEDURE — 87086 URINE CULTURE/COLONY COUNT: CPT

## 2020-02-26 PROCEDURE — 93325 DOPPLER ECHO COLOR FLOW MAPG: CPT

## 2020-02-26 PROCEDURE — 80202 ASSAY OF VANCOMYCIN: CPT

## 2020-02-26 PROCEDURE — 97110 THERAPEUTIC EXERCISES: CPT

## 2020-02-26 PROCEDURE — 81001 URINALYSIS AUTO W/SCOPE: CPT

## 2020-02-26 PROCEDURE — 80053 COMPREHEN METABOLIC PANEL: CPT

## 2020-02-26 PROCEDURE — 99285 EMERGENCY DEPT VISIT HI MDM: CPT | Mod: 25

## 2020-02-26 PROCEDURE — 86901 BLOOD TYPING SEROLOGIC RH(D): CPT

## 2020-02-26 PROCEDURE — 83550 IRON BINDING TEST: CPT

## 2020-02-26 PROCEDURE — 93312 ECHO TRANSESOPHAGEAL: CPT

## 2020-02-26 PROCEDURE — 71046 X-RAY EXAM CHEST 2 VIEWS: CPT

## 2020-02-26 PROCEDURE — 93306 TTE W/DOPPLER COMPLETE: CPT

## 2020-02-26 PROCEDURE — 96374 THER/PROPH/DIAG INJ IV PUSH: CPT

## 2020-02-26 PROCEDURE — 85652 RBC SED RATE AUTOMATED: CPT

## 2020-02-26 PROCEDURE — 97163 PT EVAL HIGH COMPLEX 45 MIN: CPT

## 2020-02-26 PROCEDURE — 93005 ELECTROCARDIOGRAM TRACING: CPT

## 2020-02-26 PROCEDURE — 99239 HOSP IP/OBS DSCHRG MGMT >30: CPT

## 2020-02-26 PROCEDURE — 82607 VITAMIN B-12: CPT

## 2020-02-26 PROCEDURE — 82728 ASSAY OF FERRITIN: CPT

## 2020-02-26 PROCEDURE — 87186 SC STD MICRODIL/AGAR DIL: CPT

## 2020-02-26 PROCEDURE — 83735 ASSAY OF MAGNESIUM: CPT

## 2020-02-26 PROCEDURE — 97116 GAIT TRAINING THERAPY: CPT

## 2020-02-26 PROCEDURE — 71250 CT THORAX DX C-: CPT

## 2020-02-26 PROCEDURE — 96375 TX/PRO/DX INJ NEW DRUG ADDON: CPT

## 2020-02-26 PROCEDURE — 86140 C-REACTIVE PROTEIN: CPT

## 2020-02-26 PROCEDURE — 84466 ASSAY OF TRANSFERRIN: CPT

## 2020-02-26 PROCEDURE — 87040 BLOOD CULTURE FOR BACTERIA: CPT

## 2020-02-26 PROCEDURE — 80048 BASIC METABOLIC PNL TOTAL CA: CPT

## 2020-02-26 PROCEDURE — 86850 RBC ANTIBODY SCREEN: CPT

## 2020-02-26 PROCEDURE — 97530 THERAPEUTIC ACTIVITIES: CPT

## 2020-02-26 PROCEDURE — 93320 DOPPLER ECHO COMPLETE: CPT

## 2020-02-26 PROCEDURE — 83605 ASSAY OF LACTIC ACID: CPT

## 2020-02-26 PROCEDURE — 85027 COMPLETE CBC AUTOMATED: CPT

## 2020-02-26 PROCEDURE — 72148 MRI LUMBAR SPINE W/O DYE: CPT

## 2020-02-26 PROCEDURE — 74176 CT ABD & PELVIS W/O CONTRAST: CPT

## 2020-02-26 PROCEDURE — 83540 ASSAY OF IRON: CPT

## 2020-02-26 PROCEDURE — 84132 ASSAY OF SERUM POTASSIUM: CPT

## 2020-02-26 RX ORDER — ESCITALOPRAM OXALATE 10 MG/1
1 TABLET, FILM COATED ORAL
Qty: 0 | Refills: 0 | DISCHARGE

## 2020-02-26 RX ORDER — MAGNESIUM SULFATE 500 MG/ML
1 VIAL (ML) INJECTION ONCE
Refills: 0 | Status: COMPLETED | OUTPATIENT
Start: 2020-02-26 | End: 2020-02-26

## 2020-02-26 RX ORDER — TAMSULOSIN HYDROCHLORIDE 0.4 MG/1
1 CAPSULE ORAL
Qty: 0 | Refills: 0 | DISCHARGE
Start: 2020-02-26

## 2020-02-26 RX ORDER — CEFTRIAXONE 500 MG/1
2 INJECTION, POWDER, FOR SOLUTION INTRAMUSCULAR; INTRAVENOUS
Qty: 0 | Refills: 0 | DISCHARGE
Start: 2020-02-26

## 2020-02-26 RX ORDER — ATORVASTATIN CALCIUM 80 MG/1
1 TABLET, FILM COATED ORAL
Qty: 0 | Refills: 0 | DISCHARGE
Start: 2020-02-26

## 2020-02-26 RX ORDER — BICALUTAMIDE 50 MG/1
1 TABLET, FILM COATED ORAL
Qty: 0 | Refills: 0 | DISCHARGE
Start: 2020-02-26

## 2020-02-26 RX ORDER — AMPICILLIN TRIHYDRATE 250 MG
2 CAPSULE ORAL
Qty: 0 | Refills: 0 | DISCHARGE
Start: 2020-02-26

## 2020-02-26 RX ORDER — BICALUTAMIDE 50 MG/1
1 TABLET, FILM COATED ORAL
Qty: 0 | Refills: 0 | DISCHARGE

## 2020-02-26 RX ORDER — AMPICILLIN TRIHYDRATE 250 MG
2 CAPSULE ORAL
Qty: 372 | Refills: 0
Start: 2020-02-26 | End: 2020-03-27

## 2020-02-26 RX ORDER — ESCITALOPRAM OXALATE 10 MG/1
1 TABLET, FILM COATED ORAL
Qty: 0 | Refills: 0 | DISCHARGE
Start: 2020-02-26

## 2020-02-26 RX ORDER — ATORVASTATIN CALCIUM 80 MG/1
1 TABLET, FILM COATED ORAL
Qty: 0 | Refills: 0 | DISCHARGE

## 2020-02-26 RX ORDER — AMPICILLIN TRIHYDRATE 250 MG
0 CAPSULE ORAL
Qty: 0 | Refills: 0 | DISCHARGE
Start: 2020-02-26

## 2020-02-26 RX ORDER — CEFTRIAXONE 500 MG/1
2 INJECTION, POWDER, FOR SOLUTION INTRAMUSCULAR; INTRAVENOUS
Qty: 124 | Refills: 0
Start: 2020-02-26 | End: 2020-03-27

## 2020-02-26 RX ORDER — GABAPENTIN 400 MG/1
1 CAPSULE ORAL
Qty: 0 | Refills: 0 | DISCHARGE
Start: 2020-02-26

## 2020-02-26 RX ORDER — ACETAMINOPHEN 500 MG
2 TABLET ORAL
Qty: 0 | Refills: 0 | DISCHARGE
Start: 2020-02-26

## 2020-02-26 RX ORDER — TAMSULOSIN HYDROCHLORIDE 0.4 MG/1
1 CAPSULE ORAL
Qty: 0 | Refills: 0 | DISCHARGE

## 2020-02-26 RX ORDER — POTASSIUM PHOSPHATE, MONOBASIC POTASSIUM PHOSPHATE, DIBASIC 236; 224 MG/ML; MG/ML
15 INJECTION, SOLUTION INTRAVENOUS ONCE
Refills: 0 | Status: COMPLETED | OUTPATIENT
Start: 2020-02-26 | End: 2020-02-26

## 2020-02-26 RX ADMIN — CEFTRIAXONE 100 MILLIGRAM(S): 500 INJECTION, POWDER, FOR SOLUTION INTRAMUSCULAR; INTRAVENOUS at 05:05

## 2020-02-26 RX ADMIN — Medication 100 GRAM(S): at 08:10

## 2020-02-26 RX ADMIN — Medication 216 GRAM(S): at 01:05

## 2020-02-26 RX ADMIN — GABAPENTIN 100 MILLIGRAM(S): 400 CAPSULE ORAL at 05:06

## 2020-02-26 RX ADMIN — Medication 216 GRAM(S): at 10:56

## 2020-02-26 RX ADMIN — POTASSIUM PHOSPHATE, MONOBASIC POTASSIUM PHOSPHATE, DIBASIC 62.5 MILLIMOLE(S): 236; 224 INJECTION, SOLUTION INTRAVENOUS at 08:10

## 2020-02-26 RX ADMIN — CHLORHEXIDINE GLUCONATE 1 APPLICATION(S): 213 SOLUTION TOPICAL at 05:08

## 2020-02-26 RX ADMIN — BICALUTAMIDE 50 MILLIGRAM(S): 50 TABLET, FILM COATED ORAL at 13:31

## 2020-02-26 RX ADMIN — Medication 12.5 MILLIGRAM(S): at 05:07

## 2020-02-26 RX ADMIN — GABAPENTIN 100 MILLIGRAM(S): 400 CAPSULE ORAL at 13:31

## 2020-02-26 RX ADMIN — ENOXAPARIN SODIUM 40 MILLIGRAM(S): 100 INJECTION SUBCUTANEOUS at 13:30

## 2020-02-26 RX ADMIN — Medication 216 GRAM(S): at 05:05

## 2020-02-26 RX ADMIN — Medication 216 GRAM(S): at 13:31

## 2020-02-26 RX ADMIN — Medication 216 GRAM(S): at 16:16

## 2020-02-26 RX ADMIN — ESCITALOPRAM OXALATE 5 MILLIGRAM(S): 10 TABLET, FILM COATED ORAL at 13:29

## 2020-02-26 NOTE — DISCHARGE NOTE PROVIDER - HOSPITAL COURSE
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 ID continues to follow the pt, thus far B cx 2/21 and 2/22 negative.  Pt to remain on rocephin and ampicillin for anticipated 6 weeks, picc line placed. 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 Pt will need weekly CBC CMP ESR CRP. Picc line to be removed upon completion abx. Pt also with acute on chronic lower back pain, MR of spine showed suspected sacral fracture and spinal stenosis, pt optimized with pain control. PT consulted and recommended [employer]. Pt medically optimized for discharge to [facility].             Vital Signs Last 24 Hrs    T(C): 36.8 (26 Feb 2020 07:47), Max: 37.1 (25 Feb 2020 19:09)    T(F): 98.3 (26 Feb 2020 07:47), Max: 98.7 (25 Feb 2020 19:09)    HR: 76 (26 Feb 2020 07:47) (70 - 79)    BP: 105/61 (26 Feb 2020 07:47) (104/58 - 140/67)    BP(mean): --    RR: 18 (26 Feb 2020 07:47) (18 - 18)    SpO2: 94% (26 Feb 2020 07:47) (92% - 97%)            CONSTITUTIONAL: Well appearing, well nourished, awake, alert and in no apparent distress    CARDIAC: Normal rate, regular rhythm.  Heart sounds S1, S2.  No murmurs, rubs or gallops     RESPIRATORY: Breath sounds clear and equal bilaterally. No wheezes, rhales or rhonchi    GASTROENTEROLOGY: soft nt nd bs +normoactive     EXTREMITIES: No edema, cyanosis or deformity     NEUROLOGICAL: Alert and oriented, no focal deficits, no motor or sensory deficits.    Silva +ve clear urine draining     SKIN: No rash, skin turgor wnl             Discharge planning took 50 minutes

## 2020-02-26 NOTE — PROGRESS NOTE ADULT - NSHPATTENDINGPLANDISCUSS_GEN_ALL_CORE
Medical team
Medical team, patient and family (via phone)
Medical team, patient and spouse
Patient RN cm sw ID
Patient wife son

## 2020-02-26 NOTE — DISCHARGE NOTE NURSING/CASE MANAGEMENT/SOCIAL WORK - NSDCPEEMAIL_GEN_ALL_CORE
[facility] for Tobacco Control email [email or web address]

## 2020-02-26 NOTE — PROGRESS NOTE ADULT - SUBJECTIVE AND OBJECTIVE BOX
[facility]  INFECTIOUS DISEASES AND INTERNAL MEDICINE at [place]  =======================================================  Lisandro Russell MD  Diplomates American Board of Internal Medicine and Infectious Diseases  Telephone [phone]  Fax            [phone]  =======================================================    [id number]  CLIFFORD DOSSE   follow up: enterococcus bacteremia    labs reviewed; 16/16 bottles positive blood cx for enterococcus faecalis  SS to ampicillin  most recent culture  in process, negative from 2/21 and 2/22/2020 thus far    s/p PICC 2/25/2020  reports diarrhea 4-5 times yesterday  this AM just 1 episode.   has rash on LEFT buttock. better per RN   =======================================================    REVIEW OF SYSTEMS:  CONSTITUTIONAL:  FATIGUE    HEENT:  No diplopia or blurred vision.  No earache, sore throat or runny nose.  CARDIOVASCULAR:  No pressure, squeezing, strangling, tightness, heaviness or aching about the chest, neck, axilla or epigastrium.  RESPIRATORY:  No cough, shortness of breath  GASTROINTESTINAL:  No nausea, vomiting or diarrhea.  GENITOURINARY:  No dysuria, frequency or urgency. No Blood in urine  MUSCULOSKELETAL:  BACK PAIN  SKIN:  No change in skin, hair or nails.  RASH  NEUROLOGIC:  No Headaches, seizures or weakness.  PSYCHIATRIC:  No disorder of thought or mood.  ENDOCRINE:  No heat or cold intolerance  HEMATOLOGICAL:  No easy bruising or bleeding.   =======================================================  Allergies  iodine (Swelling)    ======================================================  Physical Exam:  ============  (see vitals section below)    General:  No acute distress.  FRAIL  Eye: Pupils are equal, round and reactive to light, Extraocular movements are intact, Normal conjunctiva.  HENT: Normocephalic, Oral mucosa is moist, No pharyngeal erythema, No sinus tenderness.  CONJUNCTIVAL PALLOR, NO HEMORRHAGES.   POOR DENTITION, WITH MISSING TEETH, GINGIVAL INFLAMMATION  Neck: Supple, No lymphadenopathy.  Respiratory: Lungs WITH FAIR AIR ENTRY  Cardiovascular: Normal rate, Regular rhythm,  HARSH SYSTOLIC MURMUR  RIGHT ARM PICC  Gastrointestinal: Soft, Non-tender, Non-distended, Normal bowel sounds.  Genitourinary: No costovertebral angle tenderness.  MINER WITH CLEAR URINE  Lymphatics: No lymphadenopathy neck,   Musculoskeletal: Normal range of motion, Normal strength.  Integumentary: light color PINK rash on left buttock, barely noticeable.   Neurologic: Alert, Oriented, No focal deficits, Cranial Nerves II-XII are grossly intact.  Psychiatric: Appropriate mood & affect.    =======================================================      Vitals:  ============  T(F): 98 (26 Feb 2020 04:44), Max: 98.7 (25 Feb 2020 19:09)  HR: 73 (26 Feb 2020 04:44)  BP: 104/58 (26 Feb 2020 04:44)  RR: 18 (26 Feb 2020 04:44)  SpO2: 92% (26 Feb 2020 04:44) (92% - 97%)  temp max in last 48H T(F): , Max: 98.7 (02-25-20 @ 19:09)    =======================================================  Current Antibiotics:  ampicillin  IVPB 2 Gram(s) IV Intermittent every 4 hours  cefTRIAXone   IVPB 2000 milliGRAM(s) IV Intermittent every 12 hours    Other medications:  atorvastatin 20 milliGRAM(s) Oral at bedtime  bicalutamide 50 milliGRAM(s) Oral daily  chlorhexidine 2% Cloths 1 Application(s) Topical <User Schedule>  enoxaparin Injectable 40 milliGRAM(s) SubCutaneous daily  escitalopram 5 milliGRAM(s) Oral daily  gabapentin 100 milliGRAM(s) Oral three times a day  metoprolol succinate ER 12.5 milliGRAM(s) Oral daily  saccharomyces boulardii 250 milliGRAM(s) Oral two times a day  tamsulosin 0.4 milliGRAM(s) Oral at bedtime      =======================================================  Labs:                        8.5    8.50  )-----------( 179      ( 25 Feb 2020 09:01 )             28.7       WBC Count: 8.50 K/uL (02-25-20 @ 09:01)  WBC Count: 7.55 K/uL (02-23-20 @ 09:35)      02-25    137  |  100  |  5.0<L>  ----------------------------<  87  3.8   |  29.0  |  0.59    Ca    8.5<L>      25 Feb 2020 09:01  Phos  2.2     02-25  Mg     1.7     02-25        Culture - Blood (collected 02-22-20 @ 11:44)  Source: .Blood    Culture - Blood (collected 02-22-20 @ 11:44)  Source: .Blood    Culture - Blood (collected 02-21-20 @ 08:12)  Source: .Blood  Final Report (02-26-20 @ 09:00):    No growth at 5 days.    Culture - Blood (collected 02-21-20 @ 08:12)  Source: .Blood  Final Report (02-26-20 @ 09:00):    No growth at 5 days.    Culture - Blood (collected 02-20-20 @ 10:45)  Source: .Blood  Final Report (02-22-20 @ 11:30):    Growth in aerobic and anaerobic bottles: Enterococcus faecalis    Anaerobic Bottle: 23:02 Hours to positivity    Aerobic Bottle: 1 day; 03:13 Hours to positivity    ,    TYPE: (C=Critical, N=Notification, A=Abnormal) c    TESTS:  _ gs    DATE/TIME CALLED: _ 02/21/2020 10:47:55    CALLED TO: Venus white rn    READ BACK (2 Patient Identifiers)(Y/N): _ y    READ BACK VALUES (Y/N): _ y    CALLED BY: Venus boss  Organism: Enterococcus faecalis (02-22-20 @ 11:30)  Organism: Enterococcus faecalis (02-22-20 @ 11:30)    Sensitivities:      -  Ampicillin: S <=2 Predicts results to ampicillin/sulbactam, amoxacillin-clavulanate and  piperacillin-tazobactam.      -  Gentamicin synergy: S      -  Streptomycin synergy: S      -  Vancomycin: S 1      Method Type: STEPHANIE    Culture - Blood (collected 02-20-20 @ 10:45)  Source: .Blood  Final Report (02-22-20 @ 11:21):    Growth in aerobic and anaerobic bottles: Enterococcus faecalis    Anaerobic Bottle: 23:01 Hours to positivity    Aerobic Bottle: 1 day; 03:20 Hours to positivity    ,    TYPE: (C=Critical, N=Notification, A=Abnormal) c    TESTS:  _     DATE/TIME CALLED: _ 02/21/2020 10:50:22    CALLED TO: Venus white rn    READ BACK (2 Patient Identifiers)(Y/N): _ y    READ BACK VALUES (Y/N): _ y    CALLED BY: Venus boss  Organism: Enterococcus faecalis (02-22-20 @ 11:21)  Organism: Enterococcus faecalis (02-22-20 @ 11:21)    Sensitivities:      -  Ampicillin: S <=2 Predicts results to ampicillin/sulbactam, amoxacillin-clavulanate and  piperacillin-tazobactam.      -  Gentamicin synergy: S      -  Streptomycin synergy: S      -  Vancomycin: S 1      Method Type: STEPHANIE    Culture - Blood (collected 02-19-20 @ 14:22)  Source: .Blood  Final Report (02-21-20 @ 08:55):    Growth in aerobic and anaerobic bottles: Enterococcus faecalis    Aerobic Bottle: 20:26 Hours to positivity    Anaerobic Bottle: 24:26 Hours to positivity    .    TYPE: (C=Critical, N=Notification, A=Abnormal) C    TESTS:  _ Positive Blood GS    DATE/TIME CALLED: _ 02/20/2020 11:50    CALLED TO: _ 6TWR: Catalino Zhang RN    READ BACK (2 Patient Identifiers)(Y/N): _ Y    READ BACK VALUES (Y/N): _ Y    CALLED BY: Venus vincent  Organism: Enterococcus faecalis (02-21-20 @ 08:55)  Organism: Enterococcus faecalis (02-21-20 @ 08:55)    Sensitivities:      -  Ampicillin: S <=2 Predicts results to ampicillin/sulbactam, amoxacillin-clavulanate and  piperacillin-tazobactam.      -  Gentamicin synergy: S      -  Streptomycin synergy: S      -  Vancomycin: S 1      Method Type: STEPHANIE    Culture - Blood (collected 02-19-20 @ 14:22)  Source: .Blood  Final Report (02-21-20 @ 08:55):    Growth in aerobic and anaerobic bottles: Enterococcus faecalis    Anaerobic Bottle: 20:16 Hours to positivity    Aerobic Bottle: 21:16 Hours to positivity    .    TYPE: (C=Critical, N=Notification, A=Abnormal) C    TESTS:  _ Positive Blood GS    DATE/TIME CALLED: _ 02/20/2020 11:50    CALLED TO: _ 6TWR: Catalino Zhang RN    READ BACK (2 Patient Identifiers)(Y/N): _ Y    READ BACK VALUES (Y/N): _ Y    CALLED BY: Venus vincent  Organism: Enterococcus faecalis (02-21-20 @ 08:55)  Organism: Enterococcus faecalis (02-21-20 @ 08:55)    Sensitivities:      -  Ampicillin: S <=2 Predicts results to ampicillin/sulbactam, amoxacillin-clavulanate and  piperacillin-tazobactam.      -  Gentamicin synergy: S      -  Streptomycin synergy: S      -  Vancomycin: S 1      Method Type: STEPHANIE    Culture - Urine (collected 02-19-20 @ 01:35)  Source: .Urine  Final Report (02-19-20 @ 21:52):    <10,000 CFU/mL Normal Urogenital Mandie    Culture - Blood (collected 02-18-20 @ 18:39)  Source: .Blood  Final Report (02-20-20 @ 11:27):    Growth in aerobic and anaerobic bottles: Enterococcus faecalis    Aerobic Bottle: 12:18 Hours to positivity    Anaerobic Bottle: 12:28 Hours to positivity    .    TYPE: (C=Critical, N=Notification, A=Abnormal) C    TESTS:  _ Positive Blood GS    DATE/TIME CALLED: _ 02/19/2020 10:10    CALLED TO: _ ERHR: Marian Schulte RN    READ BACK (2 Patient Identifiers)(Y/N): _ Y    READ BACK VALUES (Y/N): _ Y    CALLED BY: Venus vincent  Organism: Enterococcus faecalis (02-20-20 @ 11:27)  Organism: Enterococcus faecalis (02-20-20 @ 11:27)    Sensitivities:      -  Ampicillin: S <=2 Predicts results to ampicillin/sulbactam, amoxacillin-clavulanate and  piperacillin-tazobactam.      -  Gentamicin synergy: S      -  Streptomycin synergy: S      -  Vancomycin: S 2      Method Type: STEPHANIE    Culture - Blood (collected 02-18-20 @ 18:39)  Source: .Blood  Final Report (02-20-20 @ 11:28):    Growth in aerobic and anaerobic bottles: Enterococcus faecalis    Aerobic Bottle: 12:08 Hours to positivity    Anaerobic Bottle: 12:28 Hours to positivity    .    TYPE: (C=Critical, N=Notification, A=Abnormal) C    TESTS:  _ Positive Blood GS    DATE/TIME CALLED: _ 02/19/2020 10:10    CALLED TO: _ ERHR: Marian Schulte RN    READ BACK (2 Patient Identifiers)(Y/N): _ Y    READ BACK VALUES (Y/N): _ Y    CALLED BY: Venus vincent  Organism: Enterococcus faecalis (02-20-20 @ 11:28)  Organism: Enterococcus faecalis (02-20-20 @ 11:28)    Sensitivities:      -  Ampicillin: S <=2 Predicts results to ampicillin/sulbactam, amoxacillin-clavulanate and  piperacillin-tazobactam.      -  Gentamicin synergy: S      -  Streptomycin synergy: S      -  Vancomycin: S 2      Method Type: STEPHANIE    Culture - Blood (collected 02-18-20 @ 00:50)  Source: .Blood  Final Report (02-19-20 @ 13:34):    Growth in aerobic and anaerobic bottles: Enterococcus faecalis    Anaerobic Bottle: 12:04 Hours to positivity    Aerobic Bottle: 13:08 Hours to positivity    ***Blood Panel PCR results on this specimen are available    approximately 3 hours after the Gram stain result.***    Gram stain, PCR, and/or culture results may not always    correspond due to difference in methodologies.    ************************************************************    This PCR assay was performed using [employer].    The following targets are tested for: Enterococcus,    vancomycin resistant enterococci, Listeria monocytogenes,    coagulase negative staphylococci, S. aureus,    methicillin resistant S. aureus, Streptococcus agalactiae    (Group B), S. pneumoniae, S. pyogenes (Group A),    Acinetobacter baumannii, Enterobacter cloacae, E. coli,    Klebsiella oxytoca, K. pneumoniae, Proteus sp.,    Serratia marcescens, Haemophilus influenzae,    Neisseria meningitidis, Pseudomonas aeruginosa, Candida    albicans, C. glabrata, C krusei, C parapsilosis,    C. tropicalis and the KPC resistance gene.    .    TYPE: (C=Critical, N=Notification, A=Abnormal)    C    TESTS:  _ bld     DATE/TIME CALLED: _ 02/18/2020 14:57:24    CALLED TO: Venus KINCAID    READBACK (2 Patient Identifiers)(Y/N): _ Y    READ BACK VALUES (Y/N): _ Y    CALLED BY: _ dcashin  Organism: Blood Culture PCR  Enterococcus faecalis (02-19-20 @ 13:34)  Organism: Enterococcus faecalis (02-19-20 @ 13:34)    Sensitivities:      -  Ampicillin: S <=2 Predicts results to ampicillin/sulbactam, amoxacillin-clavulanate and  piperacillin-tazobactam.      -  Gentamicin synergy: S      -  Streptomycin synergy: S      -  Vancomycin: S 1      Method Type: STEPHANIE  Organism: Blood Culture PCR (02-19-20 @ 13:34)    Sensitivities:      -  Enterococcus species: Detec      Method Type: PCR    Culture - Blood (collected 02-18-20 @ 00:49)  Source: .Blood  Final Report (02-19-20 @ 13:36):    Growth in aerobic and anaerobic bottles: Enterococcus faecalis    Anaerobic Bottle: 12:04 Hours to positivity    Aerobic Bottle: 13:18 Hours to positivity    .    TYPE: (C=Critical, N=Notification, A=Abnormal) C    TESTS:  _ Bld gs    DATE/TIME CALLED: _ 02/18/2020 15:16:55    CALLED TO: Venus KINCAID    READ BACK (2 Patient Identifiers)(Y/N): _ Y    READ BACK VALUES (Y/N): _ Y    CALLED BY: _ dcashin  Organism: Enterococcus faecalis (02-19-20 @ 13:36)  Organism: Enterococcus faecalis (02-19-20 @ 13:36)    Sensitivities:      -  Ampicillin: S <=2 Predicts results to ampicillin/sulbactam, amoxacillin-clavulanate and  piperacillin-tazobactam.      -  Gentamicin synergy: S      -  Streptomycin synergy: S      -  Vancomycin: S 1      Method Type: STEPHANIE    Culture - Urine (collected 02-17-20 @ 21:05)  Source: .Urine  Final Report (02-19-20 @ 22:11):    50,000 - 99,000 CFU/mL Enterococcus faecalis    10,000 - 49,000 CFU/mL Coag Negative Staphylococcus "Susceptibilities not    performed"  Organism: Enterococcus faecalis (02-19-20 @ 22:11)  Organism: Enterococcus faecalis (02-19-20 @ 22:11)    Sensitivities:      -  Ampicillin: S <=2 Predicts results to ampicillin/sulbactam, amoxacillin-clavulanate and  piperacillin-tazobactam.      -  Ciprofloxacin: S <=1      -  Levofloxacin: S <=1      -  Nitrofurantoin: S <=32 Should not be used to treat pyelonephritis.      -  Tetra/Doxy: R >8      -  Vancomycin: S 1      Method Type: STEPHANIE      Creatinine, Serum: 0.59 mg/dL (02-25-20 @ 09:01)  Creatinine, Serum: 0.65 mg/dL (02-23-20 @ 09:35)    C-Reactive Protein, Serum: 14.45 mg/dL (02-19-20 @ 05:51)    Sedimentation Rate, Erythrocyte: 55 mm/hr (02-19-20 @ 05:51)      =============        EXAM:  CT CHEST                        PROCEDURE DATE:  02/22/2020    INTERPRETATION:    PROCEDURE INFORMATION:   Exam: CT Chest Without Contrast   Exam date and time: 2/22/2020 8:39 PM   Age: 85 years old   Clinical indication: Abdominal pain; Chest pain   TECHNIQUE:   Imaging protocol: Computed tomography of the chest without contrast.   3D rendering: MIP and/or 3D reconstructed images were created by the   technologist.     COMPARISON:   DX XR CHEST PA AND LATERAL 2/19/2020 6:02 PM   FINDINGS:   Lungs: No pulmonary infiltrates or nodules. Peribronchial thickening involving the lower lobes.  Pleural space: Unremarkable. No pneumothorax. No pleural effusion.   Heart: Mild cardiomegaly. Trace pericardial fluid. Coronary artery calcification.  Aorta: Unremarkable. No aortic aneurysm. Mildly enlarged main pulmonary artery measuring 3.3 cm.  Lymph nodes: Unremarkable. No enlarged lymph nodes.   Bones/joints: Unremarkable. No acute fracture.   Soft tissues: Unremarkable.     IMPRESSION:   1. Mild cardiomegaly.   2. Trace pericardial effusion.   3. No pulmonary infiltrates or abscess.      =========================  PROCEDURE INFORMATION:   Exam: CT Abdomen And Pelvis Without Contrast   Exam date and time: 2/22/2020 8:39 PM   Age: 85 years old   Clinical indication: Abdominal pain; Chest pain     TECHNIQUE:   Imaging protocol: Computed tomography of the abdomen and pelvis without   contrast.   3D rendering: MIP and/or 3D reconstructed images were created by the   technologist.     COMPARISON: 02/17/2020.    FINDINGS:     Liver: Normal. No mass.   Gallbladder and bile ducts: Gallstone within the non distended noninflamed   gallbladder.   Pancreas: Normal. No ductal dilation.   Spleen: Normal. No splenomegaly.   Adrenals: Normal. No mass.   Kidneys and ureters: Bilateral renal cysts measuring up to 2.8 cm, otherwise   normal kidneys.   Stomach and bowel: Nonspecific distention. No obstruction. No mucosal thickening.   Appendix: Normal appendix.   Intraperitoneal space: Unremarkable. No free air. No significant fluid   collection.   Vasculature: Moderate atherosclerotic wall calcification abdominal aorta and   iliac vessels.   Lymph nodes: Unremarkable. No enlarged lymph nodes.     Bladder: Mild bladder wall thickening likely related to under distension though   underlying trabeculation or cystitis not excluded. Miner catheter in place. Mural calcification involving the urinary bladder. This could be related to chronic infection.  Reproductive: Mildly enlarged prostate measuring up to 5.3 cm in diameter   indenting bladder base.   Bones/joints: Right sacral insufficiency fracture, already reported on MR imaging of the spine. Multilevel degenerative disc disease and facet arthrosis with secondary spinal stenosis, most pronounced at L4-5 level.  Soft tissues: Unremarkable.     IMPRESSION:   1. No acute abdominal/pelvic CT findings.   2. Mildly enlarged prostate indenting bladder base.   3. Miner catheter within decompressed thick-walled bladder which may be   secondary to decompressed state or underlying trabeculation however is cystitis   not excluded. Please correlate with urinary analysis.      WICHO PADILLA M.D., ATTENDING RADIOLOGIST  This document has been electronically signed. Feb 23 2020  9:04AM

## 2020-02-26 NOTE — DISCHARGE NOTE NURSING/CASE MANAGEMENT/SOCIAL WORK - NSDCPEWEB_GEN_ALL_CORE
[facility] for Tobacco Control website --- [email or web address] website --- [email or web address]

## 2020-02-26 NOTE — DISCHARGE NOTE PROVIDER - NSDCFUADDAPPT_GEN_ALL_CORE_FT
Please follow up with the physician at the facility. Please follow up with the infectious disease physician Dr. Tran.

## 2020-02-26 NOTE — DISCHARGE NOTE PROVIDER - NSDCFUSCHEDAPPT_GEN_ALL_CORE_FT
CLIFFORD CARRILLO ; 03/13/2020 ; [facility]  CLIFFORD CARRILLO ; 03/13/2020 ; [facility]

## 2020-02-26 NOTE — DISCHARGE NOTE PROVIDER - CARE PROVIDER_API CALL
Blayne Tran)  Infectious Disease; Internal Medicine  [address] [place] [address], [place] [address]  Phone: [phone]  Fax: [phone]  Follow Up Time:

## 2020-02-26 NOTE — DISCHARGE NOTE NURSING/CASE MANAGEMENT/SOCIAL WORK - PATIENT PORTAL LINK FT
You can access the FollowMyHealth Patient Portal offered by [facility] by registering at the following website: [email or web address]. By joining [employer]’s FollowMyHealth portal, you will also be able to view your health information using other applications (apps) compatible with our system.

## 2020-02-26 NOTE — PROGRESS NOTE ADULT - ASSESSMENT
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     Impression:  High grade bacteremia  Enterococcus species bacteremia  Mitral regurgitation  lumbar pain  fevers at home    Plan:  MULTIPLE 16/16 culture bottles positive so far  repeat cultures in process from 2/21 and 2/22 negative    NO obvious vegetation on RONALD, flail mitral leaflet  with severe mitral regurg   - ESR and CRP  are BOTH ELEVATED    Clinical symptomatology favors endovascular infection, i.e. endocarditis.    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  - will r/o C diff  - if negative, will not treat      no contraindications to discharge to community from ID standpoint

## 2020-02-26 NOTE — DISCHARGE NOTE PROVIDER - NSDCMRMEDTOKEN_GEN_ALL_CORE_FT
acetaminophen 325 mg oral tablet: 2 tab(s) orally every 6 hours, As needed, Temp greater or equal to 38C (100.4F), Mild Pain (1 - 3), Moderate Pain (4 - 6)  ampicillin: 2 gram(s) intravenous every 4 hours    4/2/20     then rmeove picc line as per Dr. Tran   atorvastatin 20 mg oral tablet: 1 tab(s) orally once a day (at bedtime)  bicalutamide 50 mg oral tablet: 1 tab(s) orally once a day  cefTRIAXone 2 g intravenous injection: 2 gram(s) intravenous every 12 hours    end date 4/2/20   escitalopram 5 mg oral tablet: 1 tab(s) orally once a day  gabapentin 100 mg oral capsule: 1 cap(s) orally 3 times a day  metoprolol succinate 25 mg oral tablet, extended release: 0.5 tab(s) orally once a day  tamsulosin 0.4 mg oral capsule: 1 cap(s) orally once a day (at bedtime)

## 2020-03-04 NOTE — CDI QUERY NOTE - NSCDIOTHERTXTBX_GEN_ALL_CORE_HH
Further clarification is needed on the cause and effect relationship between the UTI and chronic morrison catheter.    Supporting Documentation:  2/19 Hospitalist note: 2- chronic morrison with UTI   h/o prostate cancer   2/25 Hospitalist note: Enteroccosus UTI and bacteremia in the setting of chronic indwelling morrison catheter    - afebrile   - no leukocytosis   - esr/ crp elevated   - Repeat B cx 2/21 and 2/22 thus far negative      - Ct of C/A/P ordered with po contrast : none acute, ?cystitis   - No vegetation noted on NESHA   - c/w ampicillin and rocephin   - As per ID, multiple prior B cx positive up until now, although nesha negative for vegetations, infection favoring clinical endovascular infection, will anticipate 4 weeks of IV antibiotics for 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 Tran the plan of care     Please clarify if there is a cause and effect relationship between the UTI and chronic indwelling morrison catheter  A) UTI related to/due to chronic indwelling morrison catheter  B) Other, please specify  C) Not clinically significant

## 2020-03-13 ENCOUNTER — APPOINTMENT (OUTPATIENT)
Dept: UROLOGY | Facility: CLINIC | Age: 85
End: 2020-03-13

## 2020-04-10 ENCOUNTER — APPOINTMENT (OUTPATIENT)
Dept: UROLOGY | Facility: CLINIC | Age: 85
End: 2020-04-10

## 2020-04-23 ENCOUNTER — APPOINTMENT (OUTPATIENT)
Dept: UROLOGY | Facility: CLINIC | Age: 85
End: 2020-04-23
Payer: MEDICAID

## 2020-04-23 VITALS — RESPIRATION RATE: 17 BRPM | SYSTOLIC BLOOD PRESSURE: 137 MMHG | HEART RATE: 102 BPM | DIASTOLIC BLOOD PRESSURE: 83 MMHG

## 2020-04-23 PROCEDURE — 51702 INSERT TEMP BLADDER CATH: CPT

## 2020-04-28 ENCOUNTER — EMERGENCY (EMERGENCY)
Facility: HOSPITAL | Age: 85
LOS: 1 days | Discharge: HOME | End: 2020-04-28
Attending: EMERGENCY MEDICINE
Payer: MEDICAID

## 2020-04-28 VITALS
DIASTOLIC BLOOD PRESSURE: 80 MMHG | HEART RATE: 71 BPM | RESPIRATION RATE: 16 BRPM | SYSTOLIC BLOOD PRESSURE: 112 MMHG | OXYGEN SATURATION: 98 %

## 2020-04-28 VITALS
HEART RATE: 72 BPM | TEMPERATURE: 98 F | DIASTOLIC BLOOD PRESSURE: 58 MMHG | WEIGHT: 100.09 LBS | OXYGEN SATURATION: 96 % | RESPIRATION RATE: 18 BRPM | SYSTOLIC BLOOD PRESSURE: 92 MMHG

## 2020-04-28 DIAGNOSIS — Z98.890 OTHER SPECIFIED POSTPROCEDURAL STATES: Chronic | ICD-10-CM

## 2020-04-28 LAB
ALBUMIN SERPL ELPH-MCNC: 3.2 G/DL — LOW (ref 3.3–5.2)
ALP SERPL-CCNC: 105 U/L — SIGNIFICANT CHANGE UP (ref 40–120)
ALT FLD-CCNC: 9 U/L — SIGNIFICANT CHANGE UP
ANION GAP SERPL CALC-SCNC: 17 MMOL/L — SIGNIFICANT CHANGE UP (ref 5–17)
AST SERPL-CCNC: 17 U/L — SIGNIFICANT CHANGE UP
BASOPHILS # BLD AUTO: 0.01 K/UL — SIGNIFICANT CHANGE UP (ref 0–0.2)
BASOPHILS NFR BLD AUTO: 0.1 % — SIGNIFICANT CHANGE UP (ref 0–2)
BILIRUB SERPL-MCNC: 0.7 MG/DL — SIGNIFICANT CHANGE UP (ref 0.4–2)
BUN SERPL-MCNC: 16 MG/DL — SIGNIFICANT CHANGE UP (ref 8–20)
CALCIUM SERPL-MCNC: 9.2 MG/DL — SIGNIFICANT CHANGE UP (ref 8.6–10.2)
CHLORIDE SERPL-SCNC: 96 MMOL/L — LOW (ref 98–107)
CO2 SERPL-SCNC: 22 MMOL/L — SIGNIFICANT CHANGE UP (ref 22–29)
CREAT SERPL-MCNC: 1.07 MG/DL — SIGNIFICANT CHANGE UP (ref 0.5–1.3)
EOSINOPHIL # BLD AUTO: 0.09 K/UL — SIGNIFICANT CHANGE UP (ref 0–0.5)
EOSINOPHIL NFR BLD AUTO: 0.9 % — SIGNIFICANT CHANGE UP (ref 0–6)
GLUCOSE SERPL-MCNC: 142 MG/DL — HIGH (ref 70–99)
HCT VFR BLD CALC: 35 % — LOW (ref 39–50)
HGB BLD-MCNC: 11.1 G/DL — LOW (ref 13–17)
IMM GRANULOCYTES NFR BLD AUTO: 0.4 % — SIGNIFICANT CHANGE UP (ref 0–1.5)
LIDOCAIN IGE QN: 65 U/L — HIGH (ref 22–51)
LYMPHOCYTES # BLD AUTO: 0.86 K/UL — LOW (ref 1–3.3)
LYMPHOCYTES # BLD AUTO: 8.6 % — LOW (ref 13–44)
MCHC RBC-ENTMCNC: 26.2 PG — LOW (ref 27–34)
MCHC RBC-ENTMCNC: 31.7 GM/DL — LOW (ref 32–36)
MCV RBC AUTO: 82.7 FL — SIGNIFICANT CHANGE UP (ref 80–100)
MONOCYTES # BLD AUTO: 0.55 K/UL — SIGNIFICANT CHANGE UP (ref 0–0.9)
MONOCYTES NFR BLD AUTO: 5.5 % — SIGNIFICANT CHANGE UP (ref 2–14)
NEUTROPHILS # BLD AUTO: 8.4 K/UL — HIGH (ref 1.8–7.4)
NEUTROPHILS NFR BLD AUTO: 84.5 % — HIGH (ref 43–77)
PLATELET # BLD AUTO: 118 K/UL — LOW (ref 150–400)
POTASSIUM SERPL-MCNC: 3.4 MMOL/L — LOW (ref 3.5–5.3)
POTASSIUM SERPL-SCNC: 3.4 MMOL/L — LOW (ref 3.5–5.3)
PROT SERPL-MCNC: 7 G/DL — SIGNIFICANT CHANGE UP (ref 6.6–8.7)
RBC # BLD: 4.23 M/UL — SIGNIFICANT CHANGE UP (ref 4.2–5.8)
RBC # FLD: 16.6 % — HIGH (ref 10.3–14.5)
SODIUM SERPL-SCNC: 134 MMOL/L — LOW (ref 135–145)
WBC # BLD: 9.95 K/UL — SIGNIFICANT CHANGE UP (ref 3.8–10.5)
WBC # FLD AUTO: 9.95 K/UL — SIGNIFICANT CHANGE UP (ref 3.8–10.5)

## 2020-04-28 PROCEDURE — 84443 ASSAY THYROID STIM HORMONE: CPT

## 2020-04-28 PROCEDURE — 99283 EMERGENCY DEPT VISIT LOW MDM: CPT | Mod: 25

## 2020-04-28 PROCEDURE — 85027 COMPLETE CBC AUTOMATED: CPT

## 2020-04-28 PROCEDURE — 83690 ASSAY OF LIPASE: CPT

## 2020-04-28 PROCEDURE — 80053 COMPREHEN METABOLIC PANEL: CPT

## 2020-04-28 PROCEDURE — 36415 COLL VENOUS BLD VENIPUNCTURE: CPT

## 2020-04-28 PROCEDURE — 99284 EMERGENCY DEPT VISIT MOD MDM: CPT

## 2020-04-28 PROCEDURE — 96360 HYDRATION IV INFUSION INIT: CPT

## 2020-04-28 RX ORDER — ONDANSETRON 8 MG/1
4 TABLET, FILM COATED ORAL ONCE
Refills: 0 | Status: COMPLETED | OUTPATIENT
Start: 2020-04-28 | End: 2020-04-28

## 2020-04-28 RX ORDER — SODIUM CHLORIDE 9 MG/ML
1000 INJECTION, SOLUTION INTRAVENOUS ONCE
Refills: 0 | Status: COMPLETED | OUTPATIENT
Start: 2020-04-28 | End: 2020-04-28

## 2020-04-28 RX ADMIN — SODIUM CHLORIDE 1000 MILLILITER(S): 9 INJECTION, SOLUTION INTRAVENOUS at 01:45

## 2020-04-28 RX ADMIN — SODIUM CHLORIDE 1000 MILLILITER(S): 9 INJECTION, SOLUTION INTRAVENOUS at 00:43

## 2020-04-28 NOTE — ED PROVIDER NOTE - PROGRESS NOTE DETAILS
Spoke with Patient, feeling improved, currently asymptomatic. Ready to return to home. Spoke with Son regarding father. Son states that he is not around to take patient home. Will speak with  to arrange transport.

## 2020-04-28 NOTE — ED ADULT TRIAGE NOTE - PAIN: PRESENCE, MLM
Wrist Watch/Other belongings/kiera; ipad/Cell Phone/PDA (specify)
Detail Level: Zone
Detail Level: Detailed
complains of pain/discomfort

## 2020-04-28 NOTE — ED PROVIDER NOTE - PATIENT PORTAL LINK FT
You can access the FollowMyHealth Patient Portal offered by [facility] by registering at the following website: [email or web address]. By joining [employer]’s FollowMyHealth portal, you will also be able to view your health information using other applications (apps) compatible with our system.

## 2020-04-28 NOTE — ED PROVIDER NOTE - OBJECTIVE STATEMENT
84 y/o male with PMHx of BCC, CVA, Graves Disease, HLD, HTN, Mitral regurgitation, and Prostate cancer presents to ED c/o nausea, vomiting, and diarrhea. Patient reports 1 day of N/V NBNB and diarrhea, tactile fevers. Recently discharged from [facility] for Enterococcus Bacteremia s/p PICC line and D/C to [facility]. Also reports poor appetite.     denies HA or neck pain. no chest pain or sob. no abd pain. no urinary f/u/d. no back pain. no motor or sensory deficits. denies illicit drug use. no recent travel. no rash. no other acute issues symptoms or concerns

## 2020-04-28 NOTE — ED PROVIDER NOTE - ATTENDING CONTRIBUTION TO CARE
I personally saw the patient with the resident, and completed the key components of the history and physical exam. I then discussed the management plan with the resident.   gen in nad resp clear cardiac no murmur abd soft neuro intact   I, Jay Alonzo, performed the initial face to face bedside interview with this patient regarding history of present illness, review of symptoms and relevant past medical, social and family history.  I completed an independent physical examination.  I was the initial provider who evaluated this patient.  The history, relevant review of systems, past medical and surgical history, medical decision making, and physical examination was documented by the scribe in my presence and I attest to the accuracy of the documentation.  I have signed out the follow up of any pending tests (i.e. labs, radiological studies) to the ACP.  I have communicated the patient’s plan of care and disposition with the ACP. I personally saw the patient with the resident, and completed the key components of the history and physical exam. I then discussed the management plan with the resident.   gen in nad resp clear cardiac no murmur abd soft neuro intact

## 2020-04-28 NOTE — ED ADULT NURSE NOTE - OBJECTIVE STATEMENT
Pt states that he began vomiting today and is nauseated and not feeling well. pt states he is also "very cold" Pt denies abd pain.

## 2020-04-28 NOTE — ED ADULT NURSE NOTE - NSIMPLEMENTINTERV_GEN_ALL_ED
Implemented All Universal Safety Interventions:  [place] to call system. Call bell, personal items and telephone within reach. Instruct patient to call for assistance. Room bathroom lighting operational. Non-slip footwear when patient is off stretcher. Physically safe environment: no spills, clutter or unnecessary equipment. Stretcher in lowest position, wheels locked, appropriate side rails in place.

## 2020-04-28 NOTE — ED ADULT TRIAGE NOTE - CHIEF COMPLAINT QUOTE
patient from home with complaints of nausea, vomiting and diarrhea. as per ems patient has been having multiple episodes at home which started today. ems states that patient was recently discharged from a NH, ems also states that patient had a fever yesterday did not receive any medication prior to arrival. patient also states that he has abdominal

## 2020-04-28 NOTE — ED ADULT NURSE NOTE - PMH
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

## 2020-04-28 NOTE — ED PROVIDER NOTE - CLINICAL SUMMARY MEDICAL DECISION MAKING FREE TEXT BOX
Will evaluate with lab work for infectious or metabolic processes, replete fluids, to treat nausea with medications. Plan to re-assess.

## 2020-04-30 RX ORDER — METHYLPREDNISOLONE ACETATE 40 MG/ML
40 INJECTION, SUSPENSION INTRA-ARTICULAR; INTRALESIONAL; INTRAMUSCULAR; SOFT TISSUE
Qty: 1 | Refills: 0 | Status: DISCONTINUED | COMMUNITY
Start: 2019-10-22 | End: 2020-04-30

## 2020-04-30 RX ORDER — DOXAZOSIN 2 MG/1
2 TABLET ORAL
Qty: 60 | Refills: 0 | Status: DISCONTINUED | COMMUNITY
Start: 2018-04-12 | End: 2020-04-30

## 2020-04-30 RX ORDER — METHYLPREDNISOLONE ACETATE 40 MG/ML
40 INJECTION, SUSPENSION INTRA-ARTICULAR; INTRALESIONAL; INTRAMUSCULAR; SOFT TISSUE
Qty: 1 | Refills: 0 | Status: DISCONTINUED | COMMUNITY
Start: 2019-04-01 | End: 2020-04-30

## 2020-05-08 ENCOUNTER — APPOINTMENT (OUTPATIENT)
Dept: UROLOGY | Facility: CLINIC | Age: 85
End: 2020-05-08
Payer: MEDICAID

## 2020-05-08 VITALS
RESPIRATION RATE: 16 BRPM | TEMPERATURE: 97.9 F | DIASTOLIC BLOOD PRESSURE: 80 MMHG | HEART RATE: 76 BPM | SYSTOLIC BLOOD PRESSURE: 133 MMHG

## 2020-05-08 PROCEDURE — 99214 OFFICE O/P EST MOD 30 MIN: CPT | Mod: 25

## 2020-05-08 PROCEDURE — 96402 CHEMO HORMON ANTINEOPL SQ/IM: CPT

## 2020-05-08 RX ORDER — LEUPROLIDE ACETATE 30 MG
30 KIT INTRAMUSCULAR
Qty: 1 | Refills: 0 | Status: COMPLETED | OUTPATIENT
Start: 2020-05-08

## 2020-05-08 RX ORDER — APIXABAN 2.5 MG/1
2.5 TABLET, FILM COATED ORAL
Qty: 60 | Refills: 0 | Status: DISCONTINUED | COMMUNITY
Start: 2018-11-05 | End: 2020-05-08

## 2020-05-08 RX ORDER — TAMSULOSIN HYDROCHLORIDE 0.4 MG/1
0.4 CAPSULE ORAL
Qty: 90 | Refills: 3 | Status: DISCONTINUED | COMMUNITY
Start: 2018-07-20 | End: 2020-05-08

## 2020-05-08 RX ADMIN — LEUPROLIDE ACETATE 0 MG: KIT at 00:00

## 2020-05-08 NOTE — HISTORY OF PRESENT ILLNESS
[FreeTextEntry1] : He is an 85 year-old man who is seen today with his family for prostate cancer, urinary retention and bacteremia. In February 2020, and was hospitalized at [facility] for enterococcus bacteremia. Subsequently he went to rehabilitation for a few months. Now he has an indwelling Silva catheter which is supposed to be changed monthly by a visiting nurse. He lost significant amount of weight. He has not had any more hematuria after he stopped anticoagulation therapy. He is receiving Lupron injection today and he is on Casodex. He does not seem to have had a PSA level in a while. He is still on Flomax.\par \par Cystoscopy in May 2019 showed enlarged prostate and trabeculated bladder. Urine cytology was benign. PSA level was 0.26 in March 2019.  \par \par Previous notes: He went to [facility] for a second opinion which agreed with our management of prostate cancer with Lupron and Casodex. In November 2018, he was admitted to [facility] for gross hematuria. He was placed on continuous bladder irrigation and received blood transfusion. \par PSA was 98. Prostate biopsy showed high-volume [place] 8 prostate cancer. Bone scan was normal. 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 According to the patient, around 2014, he underwent prostate biopsy in [place] and he was told was negative. In June 2018, urinalysis was negative and PSA level was 98.7. He had a stroke about 10 years ago. Lumbar MRI in the past had shown thickened bladder wall and renal cysts.

## 2020-05-08 NOTE — PHYSICAL EXAM
[Normal Appearance] : normal appearance [Well Groomed] : well groomed [Abdomen Soft] : soft [General Appearance - In No Acute Distress] : no acute distress [Abdomen Tenderness] : non-tender [Costovertebral Angle Tenderness] : no ~M costovertebral angle tenderness [Urethral Meatus] : meatus normal [Urinary Bladder Findings] : the bladder was normal on palpation [Scrotum] : the scrotum was normal [Testes Tenderness] : no tenderness of the testes [Testes Mass (___cm)] : there were no testicular masses [] : no respiratory distress [Respiration, Rhythm And Depth] : normal respiratory rhythm and effort [Exaggerated Use Of Accessory Muscles For Inspiration] : no accessory muscle use [Oriented To Time, Place, And Person] : oriented to person, place, and time [Mood] : the mood was normal [Affect] : the affect was normal [Not Anxious] : not anxious [FreeTextEntry1] : using a walker.

## 2020-05-08 NOTE — REVIEW OF SYSTEMS
[Recent Weight Loss (___ Lbs)] : recent [unfilled] ~Ulb weight loss [Diarrhea] : diarrhea [see HPI] : see HPI [Negative] : Respiratory

## 2020-05-08 NOTE — ASSESSMENT
[FreeTextEntry1] : Silva catheter is going to be changed on a monthly basis. He is no longer catheterizing. Stop Flomax. Continue multivitamins or vitamin D and calcium. Continue bicalutamide daily. Lupron 30 mg injection was given on the right side, loss number [id number], expiration date March 21, 2022. Also he is due for PSA level. He will follow up in 4 months for his next injection.\par \par Alireza Vinson MD, FACS\par [facility]\par  of Urology\par \par [address], [address]\[place] [address]\par \par [address], [address]\[place] [address]\par \par Tel: [phone]\par Fax: [phone]

## 2020-05-08 NOTE — LETTER BODY
[Dear  ___] : Dear  [unfilled], [Consult Closing:] : Thank you very much for allowing me to participate in the care of this patient.  If you have any questions, please do not hesitate to contact me. [Consult Letter:] : I had the pleasure of evaluating your patient, [unfilled]. [FreeTextEntry1] : \par \par Address: [address] [place] [id number]\par \par \par \par Phone: [phone]

## 2020-07-24 ENCOUNTER — APPOINTMENT (OUTPATIENT)
Dept: UROLOGY | Facility: CLINIC | Age: 85
End: 2020-07-24
Payer: MEDICAID

## 2020-07-24 VITALS
RESPIRATION RATE: 16 BRPM | SYSTOLIC BLOOD PRESSURE: 146 MMHG | DIASTOLIC BLOOD PRESSURE: 69 MMHG | HEART RATE: 74 BPM | TEMPERATURE: 98.5 F

## 2020-07-24 PROCEDURE — 51702 INSERT TEMP BLADDER CATH: CPT

## 2020-08-03 ENCOUNTER — APPOINTMENT (OUTPATIENT)
Dept: CARDIOLOGY | Facility: CLINIC | Age: 85
End: 2020-08-03

## 2020-08-13 ENCOUNTER — APPOINTMENT (OUTPATIENT)
Dept: CARDIOLOGY | Facility: CLINIC | Age: 85
End: 2020-08-13
Payer: MEDICAID

## 2020-08-13 ENCOUNTER — NON-APPOINTMENT (OUTPATIENT)
Age: 85
End: 2020-08-13

## 2020-08-13 VITALS
DIASTOLIC BLOOD PRESSURE: 74 MMHG | OXYGEN SATURATION: 97 % | TEMPERATURE: 98.1 F | HEIGHT: 63 IN | SYSTOLIC BLOOD PRESSURE: 128 MMHG | HEART RATE: 72 BPM | WEIGHT: 113 LBS | RESPIRATION RATE: 15 BRPM | BODY MASS INDEX: 20.02 KG/M2

## 2020-08-13 DIAGNOSIS — K21.9 GASTRO-ESOPHAGEAL REFLUX DISEASE W/OUT ESOPHAGITIS: ICD-10-CM

## 2020-08-13 DIAGNOSIS — K92.2 GASTROINTESTINAL HEMORRHAGE, UNSPECIFIED: ICD-10-CM

## 2020-08-13 DIAGNOSIS — R55 SYNCOPE AND COLLAPSE: ICD-10-CM

## 2020-08-13 DIAGNOSIS — U07.1 COVID-19: ICD-10-CM

## 2020-08-13 PROCEDURE — 93000 ELECTROCARDIOGRAM COMPLETE: CPT

## 2020-08-13 PROCEDURE — 99214 OFFICE O/P EST MOD 30 MIN: CPT

## 2020-08-13 RX ORDER — ASPIRIN ENTERIC COATED TABLETS 81 MG 81 MG/1
81 TABLET, DELAYED RELEASE ORAL
Qty: 30 | Refills: 0 | Status: DISCONTINUED | COMMUNITY
Start: 2018-03-27 | End: 2020-08-13

## 2020-08-13 RX ORDER — MULTIVIT-MIN/IRON/FOLIC ACID/K 18-600-40
500 CAPSULE ORAL
Refills: 0 | Status: ACTIVE | COMMUNITY

## 2020-08-13 RX ORDER — LEUPROLIDE ACETATE 11.25 MG
KIT INTRAMUSCULAR
Refills: 0 | Status: ACTIVE | COMMUNITY

## 2020-08-13 RX ORDER — THIAMINE HCL 50 MG
TABLET ORAL
Refills: 0 | Status: ACTIVE | COMMUNITY

## 2020-08-13 RX ORDER — BISOPROLOL FUMARATE 10 MG/1
10 TABLET, FILM COATED ORAL
Refills: 0 | Status: DISCONTINUED | COMMUNITY
End: 2020-08-13

## 2020-08-13 RX ORDER — METOPROLOL SUCCINATE 25 MG/1
25 TABLET, EXTENDED RELEASE ORAL
Qty: 15 | Refills: 0 | Status: DISCONTINUED | COMMUNITY
Start: 2018-07-25 | End: 2020-08-13

## 2020-08-24 NOTE — REASON FOR VISIT
[FreeTextEntry1] : Patient presents here for initial cardiac evaluation and to establish care. \par \par His cardiac history is that of:\par Paroxysmal atrial fibrillation as of about two years ago. \par Severe mitral valve insufficiency. \par History of a CVA 2008. \par History of hypertension. \par History of hyperlipidemia.\par \par Patient’s major medical problem at this point in time is advanced prostate cancer for which he is now being treated with hormonal therapy and has an indwelling Silva.  He has had several bouts of hematuria.  \par \par Most recent issues concerning the family include:\par Whether or not to continue anticoagulation therapy. \par The continued use of metoprolol therapy as blood pressures have been fairly low.\par Imbalance and falls. 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  He was in [place] when he had his stroke in 2008 and at that time was begun on Coumadin therapy which he had continued until just a year ago.  The bouts of hematuria lead to the discontinuation of Coumadin.  Frequent falls would seem to indicate that there is an additional hazard of that as well. \par \par He experiences exertional fatigability but moves very slowly with the use of a walker.  \par \par There is no chest pain, palpitations, PND, orthopnea, edema.  \par \par The falls all seem to be due to balance issues, not necessarily due to any loss of vision or consciousness.  \par

## 2020-08-24 NOTE — ASSESSMENT
[FreeTextEntry1] : \par  ECG shows normal sinus rhythm at 72.  First degree AV block.  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  \par \par There is no recent laboratory data available. \par \par Impression:\par 1.  The patient’s current weight of 113 pounds is down from 146 pounds November 2019.  The weight loss is likely multifactorial and includes several bouts of sepsis.  This probably contributes to the absence of hypertension at this point in time and possibly even relatively low diastolic blood pressures.  It is for this reason that the metoprolol was stopped.  \par \par 2.  Anticoagulation has been stopped again because of the risks caused by recurrent hematuria as well as the frequent falls.  Discussed with the family that this is a risk-benefit analysis and at this point in time, it seems like the appropriate decision, albeit with the risk of embolic CVA because of the a-fib.  Family understands that we cannot exclude the risk of an embolic event because of a-fib.\par \par 3.  There is a history of severe mitral regurgitation clearly heard on examination, and this is probably the underlying substrate that contributes to the atrial fibrillation. \par \par 4.  The last echo 9/2019 showed 3-4+ eccentric mitral regurgitation with severe pulmonary hypertension and LVH and normal left ventricular systolic function.  \par \par 5.  Atrial fibrillation which is not currently present but may be occurring paroxysmally and silently without any AV ava blocking agent on board.  I have suggested two-week event monitor to assess this issue and we will discuss that further on follow up.  \par

## 2020-08-24 NOTE — PHYSICAL EXAM
[Eyelids - No Xanthelasma] : the eyelids demonstrated no xanthelasmas [Normal Conjunctiva] : the conjunctiva exhibited no abnormalities [Normal Oral Mucosa] : normal oral mucosa [No Oral Cyanosis] : no oral cyanosis [No Oral Pallor] : no oral pallor [Normal Jugular Venous A Waves Present] : normal jugular venous A waves present [Normal Jugular Venous V Waves Present] : normal jugular venous V waves present [No Jugular Venous Schneider A Waves] : no jugular venous schneider A waves [Abdomen Soft] : soft [Abdomen Tenderness] : non-tender [Abdomen Mass (___ Cm)] : no abdominal mass palpated [Skin Turgor] : normal skin turgor [] : no rash [Impaired Insight] : insight and judgment were intact [Affect] : the affect was normal [No Anxiety] : not feeling anxious [FreeTextEntry1] : Unsteady gait despite the use of a walker

## 2020-08-28 ENCOUNTER — APPOINTMENT (OUTPATIENT)
Dept: UROLOGY | Facility: CLINIC | Age: 85
End: 2020-08-28
Payer: MEDICAID

## 2020-08-28 VITALS
HEART RATE: 80 BPM | TEMPERATURE: 98 F | RESPIRATION RATE: 16 BRPM | DIASTOLIC BLOOD PRESSURE: 81 MMHG | SYSTOLIC BLOOD PRESSURE: 135 MMHG

## 2020-08-28 PROCEDURE — 51702 INSERT TEMP BLADDER CATH: CPT

## 2020-08-29 LAB — PSA SERPL-MCNC: 0.05 NG/ML

## 2020-09-11 ENCOUNTER — APPOINTMENT (OUTPATIENT)
Dept: UROLOGY | Facility: CLINIC | Age: 85
End: 2020-09-11

## 2020-09-21 ENCOUNTER — RX RENEWAL (OUTPATIENT)
Age: 85
End: 2020-09-21

## 2020-09-25 ENCOUNTER — APPOINTMENT (OUTPATIENT)
Dept: UROLOGY | Facility: CLINIC | Age: 85
End: 2020-09-25
Payer: MEDICAID

## 2020-09-25 VITALS
HEIGHT: 63 IN | DIASTOLIC BLOOD PRESSURE: 77 MMHG | WEIGHT: 113 LBS | TEMPERATURE: 97.8 F | SYSTOLIC BLOOD PRESSURE: 144 MMHG | BODY MASS INDEX: 20.02 KG/M2 | HEART RATE: 80 BPM

## 2020-09-25 PROCEDURE — 96402 CHEMO HORMON ANTINEOPL SQ/IM: CPT

## 2020-09-25 PROCEDURE — 51702 INSERT TEMP BLADDER CATH: CPT

## 2020-09-25 RX ORDER — LEUPROLIDE ACETATE 30 MG
30 KIT INTRAMUSCULAR
Qty: 1 | Refills: 0 | Status: COMPLETED | OUTPATIENT
Start: 2020-09-25

## 2020-09-25 RX ADMIN — Medication 0 MG: at 00:00

## 2020-10-15 ENCOUNTER — APPOINTMENT (OUTPATIENT)
Dept: CARDIOLOGY | Facility: CLINIC | Age: 85
End: 2020-10-15
Payer: MEDICAID

## 2020-10-15 ENCOUNTER — NON-APPOINTMENT (OUTPATIENT)
Age: 85
End: 2020-10-15

## 2020-10-15 VITALS
RESPIRATION RATE: 16 BRPM | BODY MASS INDEX: 22.32 KG/M2 | HEART RATE: 57 BPM | OXYGEN SATURATION: 97 % | TEMPERATURE: 97.5 F | DIASTOLIC BLOOD PRESSURE: 60 MMHG | HEIGHT: 63 IN | SYSTOLIC BLOOD PRESSURE: 120 MMHG | WEIGHT: 126 LBS

## 2020-10-15 DIAGNOSIS — M47.812 SPONDYLOSIS W/OUT MYELOPATHY OR RADICULOPATHY, CERVICAL REGION: ICD-10-CM

## 2020-10-15 PROCEDURE — 93000 ELECTROCARDIOGRAM COMPLETE: CPT

## 2020-10-15 PROCEDURE — 99214 OFFICE O/P EST MOD 30 MIN: CPT

## 2020-10-15 RX ORDER — ATORVASTATIN CALCIUM 40 MG/1
40 TABLET, FILM COATED ORAL
Refills: 0 | Status: DISCONTINUED | COMMUNITY
Start: 2018-06-29 | End: 2020-10-15

## 2020-10-15 RX ORDER — PANTOPRAZOLE SODIUM 40 MG/1
40 TABLET, DELAYED RELEASE ORAL
Refills: 0 | Status: DISCONTINUED | COMMUNITY
End: 2020-10-15

## 2020-10-15 RX ORDER — SUCRALFATE 1 G/1
1 TABLET ORAL
Refills: 0 | Status: DISCONTINUED | COMMUNITY
End: 2020-10-15

## 2020-10-15 NOTE — PHYSICAL EXAM
[Eyelids - No Xanthelasma] : the eyelids demonstrated no xanthelasmas [Normal Conjunctiva] : the conjunctiva exhibited no abnormalities [No Oral Cyanosis] : no oral cyanosis [No Oral Pallor] : no oral pallor [Normal Oral Mucosa] : normal oral mucosa [Normal Jugular Venous A Waves Present] : normal jugular venous A waves present [Normal Jugular Venous V Waves Present] : normal jugular venous V waves present [No Jugular Venous Schneider A Waves] : no jugular venous schneider A waves [Abdomen Soft] : soft [Abdomen Tenderness] : non-tender [Abdomen Mass (___ Cm)] : no abdominal mass palpated [] : no rash [Skin Turgor] : normal skin turgor [Affect] : the affect was normal [No Anxiety] : not feeling anxious [Impaired Insight] : insight and judgment were intact [FreeTextEntry1] : Unsteady gait despite the use of a walker

## 2020-10-15 NOTE — REASON FOR VISIT
[FreeTextEntry1] : Patient presents here for  cardiac re- evaluation re management of PAF and MR \par \par His cardiac history is that of:\par Paroxysmal atrial fibrillation as of about two years ago. \par Severe mitral valve insufficiency. \par History of a CVA 2008. \par History of hypertension. \par History of hyperlipidemia.\par \par Patient’s major medical problem at this point in time is advanced prostate cancer for which he is now being treated with hormonal therapy and has an indwelling Silva.  He has had several bouts of hematuria.  \par \par Most recent issues concerning the family include:\par Whether or not to continue anticoagulation therapy. \par The continued use of metoprolol therapy as blood pressures have been fairly low.\par Imbalance and falls. 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  He was in [place] when he had his stroke in 2008 and at that time was begun on Coumadin therapy which he had continued until just a year ago.  The bouts of hematuria lead to the discontinuation of Coumadin.  Frequent falls would seem to indicate that there is an additional hazard of that as well. \par \par He experiences exertional fatigability but moves very slowly with the use of a walker.  \par \par There is no chest pain, palpitations, PND, orthopnea, edema.  \par \par The falls all seem to be due to balance issues, not necessarily due to any loss of vision or consciousness.  \par Begin metoprolol XL 50 mg p.o. daily after an event monitor showed bouts of recurrent paroxysmal atrial fibrillation.  He has seemingly tolerated this well and feels that he is less dyspneic with exertion.\par \par Only other complaint at this time is of sudden onset vertigo which began yesterday with a sudden turn of his head.  There was no associated nausea.  Symptoms today are better but have not resolved.

## 2020-10-15 NOTE — ASSESSMENT
[FreeTextEntry1] :  ECG shows normal sinus rhythm at 57 .  First degree AV block.  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  \par \par Laboratory data 9/16/2020:\par Cholesterol 255\par HDL 47\par \par Triglycerides 100\par Electrolytes and LFTs are normal\par Hemoglobin 11.2\par \par Event recorder 8/27/2022 9/9/2020\par Sinus rhythm with runs of paroxysmal atrial fibrillation accelerated rates sometimes as high as 150 to 160 bpm noted.  Occasional PVCs are noted.\par \par Impression:\par 1.  The patient’s current weight has increased by 13 pounds to 126.  .  \par \par 2.  Anticoagulation has been stopped again because of the risks caused by recurrent hematuria as well as the frequent falls.  Discussed with the family that this is a risk-benefit analysis and at this point in time, it seems like the appropriate decision, albeit with the risk of embolic CVA because of the a-fib.  Family understands that we cannot exclude the risk of an embolic event because of a-fib.\par Aspirin has not been used because of her recent problem with H. pylori and peptic ulcer disease.  Plavix has not been tried.\par \par 3.  There is a history of severe mitral regurgitation clearly heard on examination, and this is probably the underlying substrate that contributes to the atrial fibrillation. \par \par 4.  The last echo 9/2019 showed 3-4+ eccentric mitral regurgitation with severe pulmonary hypertension and LVH and normal left ventricular systolic function.  \par \par Plan:\par 1.  We will begin Plavix 75 mg p.o. daily to least have some form of anticoagulation in the face of PAF and history of CVA.\par Discussed at length the pros and cons of using full anticoagulation such as Eliquis or Xarelto even at reduced doses given the patient's history of hematuria and falls.\par \par 2.  We will change him from atorvastatin to rosuvastatin 40 mg p.o. daily given the marked elevation in his cholesterol/LDL\par \par 3.  Obtain follow-up blood work in 3 months\par \par 4.  Meclizine 25 mg p.o. as needed for the vertigo.\par \par 5.  Follow-up office visit in 4 months\par

## 2020-10-23 ENCOUNTER — APPOINTMENT (OUTPATIENT)
Dept: UROLOGY | Facility: CLINIC | Age: 85
End: 2020-10-23

## 2020-11-13 ENCOUNTER — APPOINTMENT (OUTPATIENT)
Dept: UROLOGY | Facility: CLINIC | Age: 85
End: 2020-11-13
Payer: MEDICAID

## 2020-11-13 VITALS
DIASTOLIC BLOOD PRESSURE: 82 MMHG | SYSTOLIC BLOOD PRESSURE: 130 MMHG | RESPIRATION RATE: 13 BRPM | HEART RATE: 86 BPM | HEIGHT: 63 IN | WEIGHT: 120 LBS | TEMPERATURE: 98 F | BODY MASS INDEX: 21.26 KG/M2

## 2020-11-13 PROCEDURE — 99072 ADDL SUPL MATRL&STAF TM PHE: CPT

## 2020-11-13 PROCEDURE — 51702 INSERT TEMP BLADDER CATH: CPT

## 2020-12-07 ENCOUNTER — NON-APPOINTMENT (OUTPATIENT)
Age: 85
End: 2020-12-07

## 2020-12-08 ENCOUNTER — APPOINTMENT (OUTPATIENT)
Dept: CARDIOLOGY | Facility: CLINIC | Age: 85
End: 2020-12-08

## 2020-12-10 ENCOUNTER — APPOINTMENT (OUTPATIENT)
Dept: CARDIOLOGY | Facility: CLINIC | Age: 85
End: 2020-12-10
Payer: MEDICAID

## 2020-12-10 PROCEDURE — 99072 ADDL SUPL MATRL&STAF TM PHE: CPT

## 2020-12-10 PROCEDURE — 93306 TTE W/DOPPLER COMPLETE: CPT

## 2020-12-11 ENCOUNTER — APPOINTMENT (OUTPATIENT)
Dept: UROLOGY | Facility: CLINIC | Age: 85
End: 2020-12-11

## 2020-12-14 ENCOUNTER — APPOINTMENT (OUTPATIENT)
Dept: UROLOGY | Facility: CLINIC | Age: 85
End: 2020-12-14

## 2020-12-15 ENCOUNTER — APPOINTMENT (OUTPATIENT)
Dept: UROLOGY | Facility: CLINIC | Age: 85
End: 2020-12-15
Payer: MEDICAID

## 2020-12-15 VITALS
HEART RATE: 72 BPM | TEMPERATURE: 98 F | SYSTOLIC BLOOD PRESSURE: 136 MMHG | DIASTOLIC BLOOD PRESSURE: 81 MMHG | RESPIRATION RATE: 16 BRPM

## 2020-12-15 PROCEDURE — 51702 INSERT TEMP BLADDER CATH: CPT

## 2020-12-15 PROCEDURE — 99072 ADDL SUPL MATRL&STAF TM PHE: CPT

## 2020-12-23 ENCOUNTER — APPOINTMENT (OUTPATIENT)
Dept: CARDIOLOGY | Facility: CLINIC | Age: 85
End: 2020-12-23
Payer: MEDICAID

## 2020-12-23 VITALS
RESPIRATION RATE: 16 BRPM | OXYGEN SATURATION: 98 % | BODY MASS INDEX: 20.73 KG/M2 | SYSTOLIC BLOOD PRESSURE: 115 MMHG | HEART RATE: 64 BPM | HEIGHT: 63 IN | WEIGHT: 117 LBS | DIASTOLIC BLOOD PRESSURE: 60 MMHG | TEMPERATURE: 97.5 F

## 2020-12-23 PROCEDURE — 99072 ADDL SUPL MATRL&STAF TM PHE: CPT

## 2020-12-23 PROCEDURE — 99214 OFFICE O/P EST MOD 30 MIN: CPT

## 2020-12-23 PROCEDURE — 93000 ELECTROCARDIOGRAM COMPLETE: CPT

## 2020-12-23 RX ORDER — POTASSIUM CHLORIDE 750 MG/1
10 TABLET, FILM COATED, EXTENDED RELEASE ORAL DAILY
Qty: 90 | Refills: 2 | Status: DISCONTINUED | COMMUNITY
Start: 2020-12-11 | End: 2020-12-23

## 2020-12-23 NOTE — REASON FOR VISIT
[FreeTextEntry1] : Patient presents here for  cardiac re- evaluation re management of PAF and MR \par \par His cardiac history is that of:\par Paroxysmal atrial fibrillation as of about two years ago. \par Severe mitral valve insufficiency. \par History of a CVA 2008. \par History of hypertension. \par History of hyperlipidemia.\par \par Patient’s major medical problem at this point in time is advanced prostate cancer for which he is now being treated with hormonal therapy and has an indwelling Silva.  He has had several bouts of hematuria.  \par \par Patient developed shortness of breath after his last visit here in October.  The shortness of breath not well defined but seems to be worse when he lays back and with activity.\par It was suggested to the patient that he begin furosemide but they have been reluctant to do so.\par It was suggested that they get a chest x-ray but this has not been obtained.  There is no change in diet.  There has been no increased sodium intake.  There is been no increasing lower extremity edema.\par \par Most recent issues concerning the family include:\par Whether or not to continue anticoagulation therapy. \par The continued use of metoprolol therapy as blood pressures have been fairly low.\par Imbalance and falls. 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  He was in [place] when he had his stroke in 2008 and at that time was begun on Coumadin therapy which he had continued until just a year ago.  The bouts of hematuria lead to the discontinuation of Coumadin.  Frequent falls would seem to indicate that there is an additional hazard of that as well. \par \par He experiences exertional fatigability but moves very slowly with the use of a walker.  \par \par There is no chest pain, palpitations, PND edema.  \par \par The falls all seem to be due to balance issues, not necessarily due to any loss of vision or consciousness.  \par Began metoprolol XL 50 mg p.o. daily after an event monitor showed bouts of recurrent paroxysmal atrial fibrillation.  He has seemingly tolerated this well and feels that he is less dyspneic with exertion.\par \par Only other complaint at this time is of  vertigo onset with a sudden turn of his head.  There was no associated nausea.  Symptoms today are better but have not resolved.

## 2020-12-23 NOTE — PHYSICAL EXAM
[Normal Conjunctiva] : the conjunctiva exhibited no abnormalities [Eyelids - No Xanthelasma] : the eyelids demonstrated no xanthelasmas [Normal Oral Mucosa] : normal oral mucosa [No Oral Pallor] : no oral pallor [No Oral Cyanosis] : no oral cyanosis [Normal Jugular Venous A Waves Present] : normal jugular venous A waves present [Normal Jugular Venous V Waves Present] : normal jugular venous V waves present [No Jugular Venous Schneider A Waves] : no jugular venous schneider A waves [Abdomen Soft] : soft [Abdomen Tenderness] : non-tender [Abdomen Mass (___ Cm)] : no abdominal mass palpated [Skin Turgor] : normal skin turgor [] : no rash [Impaired Insight] : insight and judgment were intact [Affect] : the affect was normal [No Anxiety] : not feeling anxious [FreeTextEntry1] : Unsteady gait despite the use of a walker

## 2020-12-23 NOTE — REVIEW OF SYSTEMS
[Recent Weight Loss (___ Lbs)] : recent [unfilled] ~Ulb weight loss [Shortness Of Breath] : shortness of breath [Dyspnea on exertion] : dyspnea during exertion [Chest  Pressure] : chest pressure [Lower Ext Edema] : no extremity edema [see HPI] : see HPI [Dizziness] : dizziness [Memory Lapses Or Loss] : memory lapses or loss [Negative] : Heme/Lymph

## 2020-12-23 NOTE — ASSESSMENT
[FreeTextEntry1] :  ECG shows normal sinus rhythm at 64 .  First degree AV block.  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  \par \par Laboratory data 9/16/2020:\par Cholesterol 255\par HDL 47\par \par Triglycerides 100\par Electrolytes and LFTs are normal\par Hemoglobin 11.2\par \par Echocardiogram 12/10/2020:\par Overall normal left ventricular systolic function.\par Partially flail posterior leaflet mitral valve with severe eccentric mitral valve regurgitation.\par Moderate tricuspid regurgitation\par Severe pulmonary artery hypertension\par \par Event recorder 8/27/2022 9/9/2020\par Sinus rhythm with runs of paroxysmal atrial fibrillation accelerated rates sometimes as high as 150 to 160 bpm noted.  Occasional PVCs are noted.\par \par Impression:\par 1.  Symptoms are most compatible with new onset of heart failure preserved ejection fraction.  Patient does not appear to be substantially fluid overloaded at this time but probably would benefit from diuretic therapy..  \par \par 2.  Anticoagulation has been stopped again because of the risks caused by recurrent hematuria as well as the frequent falls.  Discussed with the family that this is a risk-benefit analysis and at this point in time, it seems like the appropriate decision, albeit with the risk of embolic CVA because of the a-fib.  Family understands that we cannot exclude the risk of an embolic event because of a-fib.\par Aspirin has not been used because of her recent problem with H. pylori and peptic ulcer disease.  Plavix has not been tried.\par \par 3.  There is a history of severe mitral regurgitation clearly heard on examination, and this is probably the underlying substrate that contributes to the atrial fibrillation. \par \par 4.  The last echo 9/2019 showed 3-4+ eccentric mitral regurgitation with severe pulmonary hypertension and LVH and normal left ventricular systolic function.  Do not see any major changes on this echocardiogram but nonetheless the patient appears to be symptomatic of heart failure\par \par Plan:\par 1.  Encourage the patient to begin furosemide 20 mg p.o. daily.  Having trouble with potassium tablets and will give      him a powder form to give potassium 10 mEq p.o. daily.\par      Because of concerns about his relatively low blood pressure will cut metoprolol from 50 to 25 mg a day.\par \par     Metabolic panel in 2 weeks.\par \par 2.  We will change him from atorvastatin to rosuvastatin 40 mg p.o. daily given the marked elevation in his     cholesterol/LDL\par \par 3.  Obtain follow-up blood work in 3 months\par \par 4.  Meclizine 25 mg p.o. as needed for the vertigo.\par \par 5.  Continue Plavix 75 mg p.o. daily to least have some form of anticoagulation in the face of PAF and history of     CVA.\par     Discussed at length the pros and cons of using full anticoagulation such as Eliquis or Xarelto even at reduced     doses given the patient's history of hematuria and falls.\par

## 2021-01-08 ENCOUNTER — APPOINTMENT (OUTPATIENT)
Dept: UROLOGY | Facility: CLINIC | Age: 86
End: 2021-01-08

## 2021-01-22 ENCOUNTER — APPOINTMENT (OUTPATIENT)
Dept: UROLOGY | Facility: CLINIC | Age: 86
End: 2021-01-22
Payer: MEDICAID

## 2021-01-22 VITALS
HEART RATE: 74 BPM | TEMPERATURE: 96.5 F | SYSTOLIC BLOOD PRESSURE: 123 MMHG | DIASTOLIC BLOOD PRESSURE: 72 MMHG | WEIGHT: 115 LBS | BODY MASS INDEX: 20.38 KG/M2 | RESPIRATION RATE: 13 BRPM | HEIGHT: 63 IN

## 2021-01-22 PROCEDURE — 51703 INSERT BLADDER CATH COMPLEX: CPT

## 2021-01-22 PROCEDURE — 99072 ADDL SUPL MATRL&STAF TM PHE: CPT

## 2021-01-22 PROCEDURE — 96402 CHEMO HORMON ANTINEOPL SQ/IM: CPT

## 2021-01-22 RX ORDER — LEUPROLIDE ACETATE 30 MG/.5ML
30 INJECTION, SUSPENSION, EXTENDED RELEASE SUBCUTANEOUS
Refills: 0 | Status: COMPLETED | OUTPATIENT
Start: 2021-01-22

## 2021-01-22 RX ADMIN — LEUPROLIDE ACETATE 0 MG: KIT SUBCUTANEOUS at 00:00

## 2021-01-25 ENCOUNTER — NON-APPOINTMENT (OUTPATIENT)
Age: 86
End: 2021-01-25

## 2021-02-04 ENCOUNTER — APPOINTMENT (OUTPATIENT)
Dept: CARDIOLOGY | Facility: CLINIC | Age: 86
End: 2021-02-04

## 2021-02-26 ENCOUNTER — APPOINTMENT (OUTPATIENT)
Dept: UROLOGY | Facility: CLINIC | Age: 86
End: 2021-02-26

## 2021-03-01 ENCOUNTER — APPOINTMENT (OUTPATIENT)
Dept: CARDIOLOGY | Facility: CLINIC | Age: 86
End: 2021-03-01
Payer: MEDICAID

## 2021-03-01 VITALS
TEMPERATURE: 98.3 F | RESPIRATION RATE: 14 BRPM | SYSTOLIC BLOOD PRESSURE: 122 MMHG | OXYGEN SATURATION: 97 % | BODY MASS INDEX: 21.26 KG/M2 | HEART RATE: 61 BPM | DIASTOLIC BLOOD PRESSURE: 65 MMHG | WEIGHT: 120 LBS | HEIGHT: 63 IN

## 2021-03-01 PROCEDURE — 99072 ADDL SUPL MATRL&STAF TM PHE: CPT

## 2021-03-01 PROCEDURE — 99214 OFFICE O/P EST MOD 30 MIN: CPT

## 2021-03-01 PROCEDURE — 93000 ELECTROCARDIOGRAM COMPLETE: CPT

## 2021-03-01 RX ORDER — METOPROLOL SUCCINATE 50 MG/1
50 TABLET, EXTENDED RELEASE ORAL
Qty: 90 | Refills: 0 | Status: DISCONTINUED | COMMUNITY
Start: 2020-09-14 | End: 2021-03-01

## 2021-03-01 NOTE — REASON FOR VISIT
[FreeTextEntry1] : Patient presents here for  cardiac re- evaluation re management of PAF and MR, SOB/ HFpEF \par \par His cardiac history is that of:\par Paroxysmal atrial fibrillation as of about two years ago. \par Severe mitral valve insufficiency. \par History of a CVA 2008. \par History of hypertension. \par History of hyperlipidemia.\par \par Patient’s major medical problem at this point in time is advanced prostate cancer for which he is now being treated with hormonal therapy and has an indwelling Silva.  He has had several bouts of hematuria and recent UTI's\par \par Patient developed shortness of breath after his last visit here in October.  The shortness of breath not well defined but seems to be worse when he lays back and with activity.\par Patient was initially reluctant to begin furosemide therapy but eventually did after the last visit here in December with a substantial improvement in the baseline shortness of breath.\par \par We also elected to begin clopidogrel for the atrial fibrillation.  Issues of falling and hematuria prevented conventional anticoagulation and the patient peptic ulcer disease preempted use of aspirin. 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  He was in [place] when he had his stroke in 2008 and at that time was begun on Coumadin therapy which he had continued until just a year ago.  The bouts of hematuria lead to the discontinuation of Coumadin.  Frequent falls would seem to indicate that there is an additional hazard of that as well. \par \par He experiences exertional fatigability but moves very slowly with the use of a walker.  \par \par There is no chest pain, palpitations, PND edema.  \par \par The falls all seem to be due to balance issues, not necessarily due to any loss of vision or consciousness.  \par He has been tolerating 25 mg of metoprolol daily without any significant dizziness.  H\par \par Only other complaint at this time is of  vertigo onset with a sudden turn of his head.  There was no associated nausea.  Symptoms today are better but have not resolved.

## 2021-03-01 NOTE — ASSESSMENT
[FreeTextEntry1] :  ECG shows normal sinus rhythm at 61 .  First degree AV block.  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  \par \par Laboratory data \par      9/16/2020:     1/4/2021\par Chol    255             159\par HDL     47               48\par LDL    186              94\par Trigly 100               90\par Electrolytes and LFTs are normal\par Hemoglobin 11.2\par \par Echocardiogram 12/10/2020:\par Overall normal left ventricular systolic function.\par Partially flail posterior leaflet mitral valve with severe eccentric mitral valve regurgitation.\par Moderate tricuspid regurgitation\par Severe pulmonary artery hypertension\par \par Event recorder 8/27/2022 9/9/2020\par Sinus rhythm with runs of paroxysmal atrial fibrillation accelerated rates sometimes as high as 150 to 160 bpm noted.  Occasional PVCs are noted.\par \par Impression:\par 1.  Symptoms that were  most compatible with new onset of heart failure preserved ejection fraction. And while the patient did not appear to be substantially fluid overloaded, he clearly benefits from diuretic therapy..  \par \par 2.  Anticoagulation has been stopped again because of the risks caused by recurrent hematuria as well as the frequent falls.  Discussed with the family that this is a risk-benefit analysis and at this point in time, it seems like the appropriate decision, albeit with the risk of embolic CVA because of the a-fib.  Family understands that we cannot exclude the risk of an embolic event because of a-fib.\par Aspirin has not been used because of her recent problem with H. pylori and peptic ulcer disease.  Plavix has not been tried. and tolerated so far\par \par 3.  There is a history of severe mitral regurgitation clearly heard on examination, and this is probably the underlying substrate that contributes to the atrial fibrillation. \par \par 4.  The last echo 9/2019 showed 3-4+ eccentric mitral regurgitation with severe pulmonary hypertension and LVH and normal left ventricular systolic function.  Do not see any major changes on this echocardiogram but nonetheless the patient appears to be symptomatic of heart failure\par \par Plan:\par 1.  Encourage the patient to continue furosemide 20 mg p.o. daily with potassium powder form 10 mEq p.o. daily.\par      Because of concerns about his relatively low blood pressure will continue metoprolol  25 mg a day.\par \par \par 2.  We will change him from atorvastatin to rosuvastatin 40 mg p.o. daily given the marked elevation in his     cholesterol/LDL\par \par 3.  Obtain follow-up blood work in 3 months\par \par 4.  Meclizine 25 mg p.o. as needed for the vertigo.\par \par 5.  Continue Plavix 75 mg p.o. daily to least have some form of anticoagulation in the face of PAF and history of     CVA.\par 6.  Blood work and clinical follow-up in 3 months if there are no clinical changes\par

## 2021-03-01 NOTE — REVIEW OF SYSTEMS
[Recent Weight Gain (___ Lbs)] : recent [unfilled] ~Ulb weight gain [Dyspnea on exertion] : dyspnea during exertion [see HPI] : see HPI [Dizziness] : dizziness [Memory Lapses Or Loss] : memory lapses or loss [Negative] : Heme/Lymph [Recent Weight Loss (___ Lbs)] : no recent weight loss [Shortness Of Breath] : no shortness of breath [Chest  Pressure] : no chest pressure [Lower Ext Edema] : no extremity edema

## 2021-03-10 ENCOUNTER — RX RENEWAL (OUTPATIENT)
Age: 86
End: 2021-03-10

## 2021-03-16 ENCOUNTER — OUTPATIENT (OUTPATIENT)
Dept: OUTPATIENT SERVICES | Facility: HOSPITAL | Age: 86
LOS: 1 days | End: 2021-03-16
Payer: MEDICAID

## 2021-03-16 ENCOUNTER — APPOINTMENT (OUTPATIENT)
Dept: UROLOGY | Facility: CLINIC | Age: 86
End: 2021-03-16

## 2021-03-16 ENCOUNTER — APPOINTMENT (OUTPATIENT)
Dept: UROLOGY | Facility: CLINIC | Age: 86
End: 2021-03-16
Payer: MEDICAID

## 2021-03-16 ENCOUNTER — NON-APPOINTMENT (OUTPATIENT)
Age: 86
End: 2021-03-16

## 2021-03-16 VITALS — SYSTOLIC BLOOD PRESSURE: 126 MMHG | HEART RATE: 67 BPM | DIASTOLIC BLOOD PRESSURE: 85 MMHG

## 2021-03-16 DIAGNOSIS — Z98.890 OTHER SPECIFIED POSTPROCEDURAL STATES: Chronic | ICD-10-CM

## 2021-03-16 DIAGNOSIS — R35.0 FREQUENCY OF MICTURITION: ICD-10-CM

## 2021-03-16 PROCEDURE — 99212 OFFICE O/P EST SF 10 MIN: CPT | Mod: 25

## 2021-03-16 PROCEDURE — 51703 INSERT BLADDER CATH COMPLEX: CPT

## 2021-03-16 PROCEDURE — 99072 ADDL SUPL MATRL&STAF TM PHE: CPT

## 2021-03-16 NOTE — HISTORY OF PRESENT ILLNESS
[FreeTextEntry1] : Visiting nurse could not change catheter and significant bleeding from meatus.\par Our nurse also had difficulties resulting in further bleeding. \par I inserted 2 tubes of xylocaine and used a Coude [id number] ....successful

## 2021-03-17 DIAGNOSIS — R33.9 RETENTION OF URINE, UNSPECIFIED: ICD-10-CM

## 2021-03-30 ENCOUNTER — RX RENEWAL (OUTPATIENT)
Age: 86
End: 2021-03-30

## 2021-03-31 ENCOUNTER — RX RENEWAL (OUTPATIENT)
Age: 86
End: 2021-03-31

## 2021-05-14 ENCOUNTER — APPOINTMENT (OUTPATIENT)
Dept: UROLOGY | Facility: CLINIC | Age: 86
End: 2021-05-14
Payer: MEDICAID

## 2021-05-14 VITALS
HEIGHT: 63 IN | TEMPERATURE: 98.5 F | BODY MASS INDEX: 21.26 KG/M2 | WEIGHT: 120 LBS | SYSTOLIC BLOOD PRESSURE: 134 MMHG | DIASTOLIC BLOOD PRESSURE: 81 MMHG

## 2021-05-14 PROCEDURE — 96402 CHEMO HORMON ANTINEOPL SQ/IM: CPT

## 2021-05-14 PROCEDURE — 99072 ADDL SUPL MATRL&STAF TM PHE: CPT

## 2021-05-14 PROCEDURE — 99213 OFFICE O/P EST LOW 20 MIN: CPT | Mod: 25

## 2021-05-14 RX ORDER — LEUPROLIDE ACETATE 30 MG/.5ML
30 INJECTION, SUSPENSION, EXTENDED RELEASE SUBCUTANEOUS
Refills: 0 | Status: COMPLETED | OUTPATIENT
Start: 2021-05-14

## 2021-05-14 RX ADMIN — LEUPROLIDE ACETATE 0 MG: KIT SUBCUTANEOUS at 00:00

## 2021-05-14 NOTE — PHYSICAL EXAM
[General Appearance - Well Developed] : well developed [General Appearance - Well Nourished] : well nourished [Normal Appearance] : normal appearance [Well Groomed] : well groomed [General Appearance - In No Acute Distress] : no acute distress [Abdomen Soft] : soft [Abdomen Tenderness] : non-tender [Costovertebral Angle Tenderness] : no ~M costovertebral angle tenderness [Urethral Meatus] : meatus normal [Penis Abnormality] : normal circumcised penis [Urinary Bladder Findings] : the bladder was normal on palpation [Scrotum] : the scrotum was normal [Testes Tenderness] : no tenderness of the testes [Testes Mass (___cm)] : there were no testicular masses [FreeTextEntry1] : Silva with cloudy urine.

## 2021-05-14 NOTE — HISTORY OF PRESENT ILLNESS
[FreeTextEntry1] : He is an 86 year-old man who is seen today with his family for prostate cancer and urinary retention.  Now visiting nurse changes his catheter on a monthly basis.  He is here today for Eligard injection.  PSA level by his primary care physician in April 2021 was 0.04.  He is on bicalutamide.\par \par In February 2020, and was hospitalized at [facility] for enterococcus bacteremia. Subsequently he went to rehabilitation for a few months. Cystoscopy in May 2019 showed enlarged prostate and trabeculated bladder. Urine cytology was benign. \par \par Previous notes: He went to [facility] for a second opinion which agreed with our management of prostate cancer with Lupron and Casodex. In November 2018, he was admitted to [facility] for gross hematuria. He was placed on continuous bladder irrigation and received blood transfusion. \par PSA was 98. Prostate biopsy showed high-volume [place] 8 prostate cancer. Bone scan was normal. 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 According to the patient, around 2014, he underwent prostate biopsy in [place] and he was told was negative. In June 2018, urinalysis was negative and PSA level was 98.7. He had a stroke about 10 years ago. Lumbar MRI in the past had shown thickened bladder wall and renal cysts.

## 2021-05-14 NOTE — ASSESSMENT
[FreeTextEntry1] : He is due for Silva catheter change tomorrow by his nurse.  Eligard 30 mg injection was given on the right side.  PSA level remains low.  He can follow-up in 4 months for the next injection.  He will continue with bicalutamide.\par \par Alireza Vinson MD, FACS\par [facility]\par  of Urology\par \par [address], [address]\[place] [address]\par \par [address], [address]\[place] [address]\par \par Tel: [phone]\par Fax: [phone]

## 2021-05-14 NOTE — LETTER BODY
[Dear  ___] : Dear  [unfilled], [Consult Letter:] : I had the pleasure of evaluating your patient, [unfilled]. [Consult Closing:] : Thank you very much for allowing me to participate in the care of this patient.  If you have any questions, please do not hesitate to contact me. [FreeTextEntry1] : \par \par Address: [address] [place] [address]\par \par \par \par Phone: [phone]

## 2021-06-07 ENCOUNTER — APPOINTMENT (OUTPATIENT)
Dept: CARDIOLOGY | Facility: CLINIC | Age: 86
End: 2021-06-07
Payer: MEDICAID

## 2021-06-24 ENCOUNTER — APPOINTMENT (OUTPATIENT)
Dept: CARDIOLOGY | Facility: CLINIC | Age: 86
End: 2021-06-24
Payer: MEDICAID

## 2021-06-24 VITALS
HEART RATE: 70 BPM | RESPIRATION RATE: 14 BRPM | SYSTOLIC BLOOD PRESSURE: 125 MMHG | WEIGHT: 119 LBS | OXYGEN SATURATION: 96 % | HEIGHT: 63 IN | DIASTOLIC BLOOD PRESSURE: 55 MMHG | BODY MASS INDEX: 21.09 KG/M2

## 2021-06-24 PROCEDURE — 99214 OFFICE O/P EST MOD 30 MIN: CPT

## 2021-06-24 PROCEDURE — 93000 ELECTROCARDIOGRAM COMPLETE: CPT

## 2021-06-24 PROCEDURE — 99072 ADDL SUPL MATRL&STAF TM PHE: CPT

## 2021-06-24 RX ORDER — MECLIZINE HYDROCHLORIDE 25 MG/1
25 TABLET ORAL 3 TIMES DAILY
Qty: 90 | Refills: 1 | Status: DISCONTINUED | COMMUNITY
Start: 2020-10-15 | End: 2021-06-24

## 2021-06-24 NOTE — ASSESSMENT
[FreeTextEntry1] :  ECG shows normal sinus rhythm at 70  .  First degree AV block.  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  \par \par Laboratory data \par      9/16/2020:     1/4/2021\par Chol    255             159\par HDL     47               48\par LDL    186              94\par Trigly 100               90\par Electrolytes and LFTs are normal\par Hemoglobin 11.2\par \par Echocardiogram 6/14/2021 [facility]\par LVH with normal systolic function ejection fraction 60 to 65%\par Severe pulmonary hypertension 92 mmHg\par Severe left atrial enlargement\par Severe mitral regurgitation\par Moderately severe tricuspid regurgitation\par \par Echocardiogram 12/10/2020:\par Overall normal left ventricular systolic function.\par Partially flail posterior leaflet mitral valve with severe eccentric mitral valve regurgitation.\par Moderate tricuspid regurgitation\par Severe pulmonary artery hypertension\par \par Event recorder 8/27/2022 9/9/2020\par Sinus rhythm with runs of paroxysmal atrial fibrillation accelerated rates sometimes as high as 150 to 160 bpm noted.  Occasional PVCs are noted.\par \par Impression:\par 1.  Symptoms of anorexia and diarrhea status post urgent cholecystectomy for gangrenous cholecystitis.\par      History of congestive heart failure but currently appears quite euvolemic\par \par 2.  Anticoagulation has been stopped again because of the risks caused by recurrent hematuria as well as the frequent falls.  Discussed with the family that this is a risk-benefit analysis and at this point in time, it seems like the appropriate decision, albeit with the risk of embolic CVA because of the a-fib.  Family understands that we cannot exclude the risk of an embolic event because of a-fib.\par Aspirin has not been used because of her recent problem with H. pylori and peptic ulcer disease.  Plavix has not been tried. and tolerated so far\par \par 3.  There is a history of severe mitral regurgitation clearly heard on examination, and this is probably the underlying substrate that contributes to the atrial fibrillation. \[facility] echocardiogram similar to what we saw here in the office December 2020\par \par 4.  The last echo 9/2019 showed 3-4+ eccentric mitral regurgitation with severe pulmonary hypertension and LVH and normal left ventricular systolic function.  Do not see any major changes on this echocardiogram but nonetheless the patient appears to be symptomatic of heart failure\par \par Plan:\par 1.  Encourage the patient to continue furosemide 20 mg p.o. daily with potassium powder form 10 mEq p.o. daily.\par      Because of concerns about his relatively low blood pressure will continue metoprolol  25 mg a day.\par \par 2.  We will continue rosuvastatin 40 mg p.o. daily given the marked elevation in his  cholesterol/LDL\par \par 3.  Obtain follow-up blood work in 3 months\par \par 4.  Meclizine 25 mg p.o. as needed for the vertigo.\par \par 5.  Continue Plavix 75 mg p.o. daily to least have some form of anticoagulation in the face of PAF and history of     CVA.\par 6.  Blood work and clinical follow-up in 3 months if there are no clinical changes\par \par 7.  Encourage patient to follow-up with surgery and GI with regard to the symptoms of anorexia and diarrhea.  This should be sooner than later.\par Contact me if there are any new symptoms of shortness of breath, orthopnea or edema.

## 2021-06-24 NOTE — REASON FOR VISIT
[FreeTextEntry1] : Patient presents here for  cardiac re- evaluation re management of PAF and MR, SOB/ HFpEF \par \par Patient was hospitalized at [facility] 6/12/2021 to 6/17/2021 with acute gangrenous cholecystitis.\par Underwent an emergency laparoscopic cholecystectomy.\par Cardiology consultation performed may have been with another group.  We were not informed.\par Since he is home he is continued on antibiotics and had diarrhea.  He feels weakened and has anorexia.\par Denies any shortness of breath chest pain palpitations or edema.  \par \par His cardiac history is that of:\par Paroxysmal atrial fibrillation as of about two years ago. \par Severe mitral valve insufficiency. \par History of a CVA 2008. \par History of hypertension. \par History of hyperlipidemia.\par \par Patient’s major medical problem at this point in time is advanced prostate cancer for which he is now being treated with hormonal therapy and has an indwelling Silva.  He has had several bouts of hematuria and recent UTI's\par \par Patient developed shortness of breath after his last visit here in October.  The shortness of breath not well defined but seems to be worse when he lays back and with activity.\par Patient was initially reluctant to begin furosemide therapy but eventually did after the last visit here in December with a substantial improvement in the baseline shortness of breath.\par \par We also elected to begin clopidogrel for the atrial fibrillation.  Issues of falling and hematuria prevented conventional anticoagulation and the patient peptic ulcer disease preempted use of aspirin. 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  He was in [place] when he had his stroke in 2008 and at that time was begun on Coumadin therapy which he had continued until just a year ago.  The bouts of hematuria lead to the discontinuation of Coumadin.  Frequent falls would seem to indicate that there is an additional hazard of that as well. \par \par He has baseline exertional fatigability but moves very slowly with the use of a walker.  \par \par There is no chest pain, palpitations, PND edema.  \par \par The falls all seem to be due to balance issues, not necessarily due to any loss of vision or consciousness.  \par He has been tolerating 25 mg of metoprolol daily without any significant dizziness. \par \par

## 2021-06-24 NOTE — PHYSICAL EXAM
[Normal Conjunctiva] : the conjunctiva exhibited no abnormalities [Eyelids - No Xanthelasma] : the eyelids demonstrated no xanthelasmas [Normal Oral Mucosa] : normal oral mucosa [No Oral Pallor] : no oral pallor [No Oral Cyanosis] : no oral cyanosis [Normal Jugular Venous A Waves Present] : normal jugular venous A waves present [Normal Jugular Venous V Waves Present] : normal jugular venous V waves present [No Jugular Venous Schneider A Waves] : no jugular venous schneider A waves [Abdomen Soft] : soft [Abdomen Tenderness] : non-tender [Abdomen Mass (___ Cm)] : no abdominal mass palpated [FreeTextEntry1] : Unsteady gait despite the use of a walker [Skin Turgor] : normal skin turgor [] : no rash [Affect] : the affect was normal [Impaired Insight] : insight and judgment were intact [No Anxiety] : not feeling anxious

## 2021-07-09 NOTE — ADDENDUM
[FreeTextEntry1] : Documented by Surinder Ly acting as a scribe for CODI Michael on 03/08/2019\par All medical record entries made by the Scribe were at my, CODI Michael, direction and personally dictated by me on 03/08/2019 . I have reviewed the chart and agree that the record accurately reflects my personal performance of the history, physical exam, assessment and plan. I have also personally directed, reviewed, and agreed with the chart.  Patient

## 2021-09-10 ENCOUNTER — APPOINTMENT (OUTPATIENT)
Dept: UROLOGY | Facility: CLINIC | Age: 86
End: 2021-09-10
Payer: MEDICAID

## 2021-09-10 VITALS
WEIGHT: 119 LBS | SYSTOLIC BLOOD PRESSURE: 151 MMHG | HEIGHT: 63 IN | DIASTOLIC BLOOD PRESSURE: 82 MMHG | HEART RATE: 66 BPM | BODY MASS INDEX: 21.09 KG/M2

## 2021-09-10 PROCEDURE — 99213 OFFICE O/P EST LOW 20 MIN: CPT | Mod: 25

## 2021-09-10 PROCEDURE — 96402 CHEMO HORMON ANTINEOPL SQ/IM: CPT

## 2021-09-10 RX ORDER — LEUPROLIDE ACETATE 30 MG/.5ML
30 INJECTION, SUSPENSION, EXTENDED RELEASE SUBCUTANEOUS
Refills: 0 | Status: COMPLETED | OUTPATIENT
Start: 2021-09-10

## 2021-09-10 RX ADMIN — LEUPROLIDE ACETATE 0 MG: KIT SUBCUTANEOUS at 00:00

## 2021-09-10 NOTE — PHYSICAL EXAM
[General Appearance - Well Developed] : well developed [General Appearance - Well Nourished] : well nourished [Normal Appearance] : normal appearance [Well Groomed] : well groomed [General Appearance - In No Acute Distress] : no acute distress [Abdomen Soft] : soft [Abdomen Tenderness] : non-tender [Costovertebral Angle Tenderness] : no ~M costovertebral angle tenderness [Urethral Meatus] : meatus normal [Penis Abnormality] : normal circumcised penis [Urinary Bladder Findings] : the bladder was normal on palpation [Scrotum] : the scrotum was normal [Testes Tenderness] : no tenderness of the testes [Testes Mass (___cm)] : there were no testicular masses [FreeTextEntry1] : Using a walker

## 2021-09-10 NOTE — HISTORY OF PRESENT ILLNESS
[FreeTextEntry1] : He is an 86 year-old man who is seen today with his family for prostate cancer and urinary retention.  He has had no difficulty with the visiting nurse changing his Silva catheter on a monthly basis.  Catheter is draining clear yellow urine right now.  Most recent PSA level was 0.04 in April 2019.  He is on bicalutamide and also receiving an Eligard 30 mg subcutaneous injection today.  There is no hematuria.  There is no flank pain.\par \par Previous notes: In February 2020, and was hospitalized at [facility] for enterococcus bacteremia. Subsequently he went to rehabilitation for a few months. Cystoscopy in May 2019 showed enlarged prostate and trabeculated bladder. Urine cytology was benign. \par \par He went to [facility] for a second opinion which agreed with our management of prostate cancer with leuprolide and Casodex. In November 2018, he was admitted to [facility] for gross hematuria. He was placed on continuous bladder irrigation and received blood transfusion. \par PSA was 98. Prostate biopsy showed high-volume [place] 8 prostate cancer. Bone scan was normal. 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 In June 2018, urinalysis was negative and PSA level was 98.7. He had a stroke about 10 years ago. Lumbar MRI in the past had shown thickened bladder wall and renal cysts.

## 2021-09-10 NOTE — LETTER BODY
[Dear  ___] : Dear  [unfilled], [Consult Letter:] : I had the pleasure of evaluating your patient, [unfilled]. [Consult Closing:] : Thank you very much for allowing me to participate in the care of this patient.  If you have any questions, please do not hesitate to contact me. [FreeTextEntry1] : \par \par Address: [address] [place] [address]\par \par \par \par Phone: [phone]

## 2021-09-10 NOTE — ASSESSMENT
[FreeTextEntry1] : Eligard 30 mg junction was given on the right side.  He tolerated well.  He will go for PSA level to a local lab.  Silva catheter is being changed on a monthly basis.  He can follow-up in 4 months for the next injection.  Continue bicalutamide.\par \par Alireza Vinson MD, FACS\par [facility]\par  of Urology\par [address] [place] [address], [address]\[place] [address]\par Tel: [phone]\par Fax: [phone]\par

## 2021-09-29 ENCOUNTER — RX RENEWAL (OUTPATIENT)
Age: 86
End: 2021-09-29

## 2021-10-13 ENCOUNTER — APPOINTMENT (OUTPATIENT)
Dept: CARDIOLOGY | Facility: CLINIC | Age: 86
End: 2021-10-13
Payer: MEDICAID

## 2021-10-13 VITALS
HEART RATE: 63 BPM | BODY MASS INDEX: 22.29 KG/M2 | HEIGHT: 63 IN | SYSTOLIC BLOOD PRESSURE: 142 MMHG | DIASTOLIC BLOOD PRESSURE: 70 MMHG | OXYGEN SATURATION: 96 % | WEIGHT: 125.8 LBS | RESPIRATION RATE: 16 BRPM

## 2021-10-13 PROCEDURE — 93000 ELECTROCARDIOGRAM COMPLETE: CPT

## 2021-10-13 PROCEDURE — 99214 OFFICE O/P EST MOD 30 MIN: CPT

## 2021-10-13 RX ORDER — FUROSEMIDE 20 MG/1
20 TABLET ORAL DAILY
Qty: 90 | Refills: 3 | Status: DISCONTINUED | COMMUNITY
Start: 2020-12-23 | End: 2021-10-13

## 2021-10-13 NOTE — REASON FOR VISIT
[FreeTextEntry1] : Patient presents here for  cardiac re- evaluation re management of PAF and MR, SOB/ HFpEF \par \par Has been stable since the last visit.\par Rare palpitations.  Some poorly defined abdominal discomfort when supine.\par No edema.\par No increasing shortness of breath.\par \par In [facility] 6/12/2021 to 6/17/2021 with acute gangrenous cholecystitis.\par Underwent an emergency laparoscopic cholecystectomy.\par Cardiology consultation performed may have been with another group.  We were not informed.\par \par His cardiac history is that of:\par Paroxysmal atrial fibrillation as of about two years ago. \par Severe mitral valve insufficiency. \par History of a CVA 2008. \par History of hypertension. \par History of hyperlipidemia.\par \par Patient’s major medical problem at this point in time is advanced prostate cancer for which he is now being treated with hormonal therapy and has an indwelling Silva.  He has had several bouts of hematuria and recent UTI's\par \par Patient developed shortness of breath after his last visit here in October.  The shortness of breath not well defined but seems to be worse when he lays back and with activity.\par Patient was initially reluctant to begin furosemide therapy but eventually did after the last visit here in December with a substantial improvement in the baseline shortness of breath.\par He stopped taking potassium because tablets were too large\par \par We also elected to begin clopidogrel for the atrial fibrillation.  Issues of falling and hematuria prevented conventional anticoagulation and the patient peptic ulcer disease preempted use of aspirin. 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  He was in [place] when he had his stroke in 2008 and at that time was begun on Coumadin therapy which he had continued until just a year ago.  The bouts of hematuria lead to the discontinuation of Coumadin.  Frequent falls would seem to indicate that there is an additional hazard of that as well. \par \par He has baseline exertional fatigability but moves very slowly with the use of a walker.  \par \par There is no chest pain, palpitations, PND edema.  \par \par The falls all seem to be due to balance issues, not necessarily due to any loss of vision or consciousness.  \par He has been tolerating 25 mg of metoprolol daily without any significant dizziness. \par \par

## 2021-10-13 NOTE — ASSESSMENT
[FreeTextEntry1] :  ECG shows normal sinus rhythm at 63   .  First degree AV block. 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  \par \par Laboratory data \par      9/16/2020:     1/4/2021\par Chol    255             159\par HDL     47               48\par LDL    186              94\par Trigly 100               90\par Electrolytes and LFTs are normal\par Hemoglobin 11.2\par \par Echocardiogram 6/14/2021 [facility]\par LVH with normal systolic function ejection fraction 60 to 65%\par Severe pulmonary hypertension 92 mmHg\par Severe left atrial enlargement\par Severe mitral regurgitation\par Moderately severe tricuspid regurgitation\par \par Echocardiogram 12/10/2020:\par Overall normal left ventricular systolic function.\par Partially flail posterior leaflet mitral valve with severe eccentric mitral valve regurgitation.\par Moderate tricuspid regurgitation\par Severe pulmonary artery hypertension\par \par Event recorder 8/27/2022 9/9/2020\par Sinus rhythm with runs of paroxysmal atrial fibrillation accelerated rates sometimes as high as 150 to 160 bpm noted.  Occasional PVCs are noted.\par \par Impression:\par 1.  New right bundle branch block pattern noted on ECG.  In addition to first-degree and left anterior fascicular block.\par \par 2.  Anticoagulation has been stopped again because of the risks caused by recurrent hematuria as well as the frequent falls.  Discussed with the family that this is a risk-benefit analysis and at this point in time, it seems like the appropriate decision, albeit with the risk of embolic CVA because of the a-fib.  Family understands that we cannot exclude the risk of an embolic event because of a-fib.\par Aspirin has not been used because of  recent problem with H. pylori and peptic ulcer disease.  Plavix has not been tried. and tolerated so far\par \par 3.  There is a history of severe mitral regurgitation clearly heard on examination, and this is probably the underlying substrate that contributes to the atrial fibrillation. \par [facility] echocardiogram similar to what we saw here in the office December 2020\par \par 4.  The last echo 9/2019 showed 3-4+ eccentric mitral regurgitation with severe pulmonary hypertension and LVH and normal left ventricular systolic function.  Do not see any major changes on this echocardiogram but nonetheless the patient appears to be symptomatic of heart failure\par \par Plan:\par 1.  Encourage the patient to continue furosemide 20 mg p.o. daily will add powdered potassium\par      Because of concerns about his relatively low blood pressure will continue metoprolol  25 mg a day.\par \par 2.  We will continue rosuvastatin 40 mg p.o. daily given the marked elevation in his  cholesterol/LDL\par \par 3.  Obtain follow-up blood work in 3 months\par \par 4.  Meclizine 25 mg p.o. as needed for the vertigo.\par \par 5.  Continue Plavix 75 mg p.o. daily to least have some form of anticoagulation in the face of PAF and history of     CVA.\par 6.  Blood work and clinical follow-up in 3 months if there are no clinical changes\par \par 7.  Echocardiogram at next office visit\par 7.  Encourage patient to follow-up with surgery and GI with regard to the symptoms of anorexia and diarrhea.  This should be sooner than later.\par Contact me if there are any new symptoms of shortness of breath, orthopnea or edema.

## 2021-10-14 RX ORDER — POTASSIUM CHLORIDE 750 MG/1
10 TABLET, EXTENDED RELEASE ORAL DAILY
Qty: 90 | Refills: 3 | Status: DISCONTINUED | COMMUNITY
Start: 2020-12-24 | End: 2021-10-14

## 2021-10-15 NOTE — ED ADULT TRIAGE NOTE - ARRIVAL FROM
Pt here via EMS with c/o L knee pain.  Pt states she lives in independent living facility and bent down to  some crumbs while holding onto the counter and her legs went into a split like motion twisting her left knee.  Pt denies any other injury.  Pt denies hitting her head.  Pt only c/o pain with movement.     Home

## 2021-12-29 ENCOUNTER — APPOINTMENT (OUTPATIENT)
Dept: UROLOGY | Facility: CLINIC | Age: 86
End: 2021-12-29
Payer: MEDICAID

## 2021-12-29 LAB — PSA SERPL-MCNC: 0.36 NG/ML

## 2021-12-29 PROCEDURE — 96402 CHEMO HORMON ANTINEOPL SQ/IM: CPT

## 2021-12-29 PROCEDURE — 99213 OFFICE O/P EST LOW 20 MIN: CPT | Mod: 25

## 2021-12-29 NOTE — ASSESSMENT
[FreeTextEntry1] : Eligard 30 mg injection was given on the right side.  Increase in PSA level was noted.  He will continue to monitor in 4 months.  Continue bicalutamide for now.  Vitamin D and calcium should also be continued.  Silva catheter is getting changed on a monthly basis without difficulty.  He will follow up in 4 months for the next Eligard injection.\par \par Alireza Vinson MD, FACS\par [facility]\par  of Urology\par \par [address], [address]\[place] [address]\par \par [address], [address]\[place] [address]\par \par Tel: [phone]\par Fax: [phone]

## 2021-12-29 NOTE — HISTORY OF PRESENT ILLNESS
[FreeTextEntry1] : He is an 86 year-old man who is seen today with his wife for prostate cancer and urinary retention.  His visiting nurse is changing Silva catheter on a monthly basis without difficulty.  He is continuing to receive leuprolide/Eligard injections.  He is also on bicalutamide.  PSA level was 0.36 in December 2021.  There is no new weight loss or bone pain. PSA level was 0.04 in April 2021.\par \par Previous notes: In February 2020, and was hospitalized at [facility] for enterococcus bacteremia. Subsequently he went to rehabilitation for a few months. Cystoscopy in May 2019 showed enlarged prostate and trabeculated bladder. Urine cytology was benign. \par \par He went to [facility] for a second opinion which agreed with our management of prostate cancer with leuprolide and Casodex. In November 2018, he was admitted to [facility] for gross hematuria. He was placed on continuous bladder irrigation and received blood transfusion. \par PSA was 98. Prostate biopsy showed high-volume Mikael 8 prostate cancer. Bone scan was normal. 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 According to the patient, around 2014, he underwent prostate biopsy in [place] and he was told was negative. In June 2018, urinalysis was negative and PSA level was 98.7. He had a stroke about 10 years ago. Lumbar MRI in the past had shown thickened bladder wall and renal cysts.

## 2021-12-29 NOTE — LETTER BODY
[Dear  ___] : Dear  [unfilled], [Consult Letter:] : I had the pleasure of evaluating your patient, [unfilled]. [Consult Closing:] : Thank you very much for allowing me to participate in the care of this patient.  If you have any questions, please do not hesitate to contact me. [FreeTextEntry1] : \par \par Address: [address] [place] [id number]\par \par \par \par Phone: [phone]

## 2022-01-14 ENCOUNTER — APPOINTMENT (OUTPATIENT)
Dept: UROLOGY | Facility: CLINIC | Age: 87
End: 2022-01-14

## 2022-01-27 NOTE — ED ADULT NURSE NOTE - NS_ED_NURSE_TEACHING_TOPIC_ED_A_ED
SB   Pt still complaining of severe generalized pain.  Can we Increase PRN's?    Thanks  Jean-Pierre ANDREW   *[phone]    Orders Received: PRN Dilaudid increased    Digestive/Other specify/Return to the ED with new or worsening

## 2022-02-09 ENCOUNTER — APPOINTMENT (OUTPATIENT)
Dept: CARDIOLOGY | Facility: CLINIC | Age: 87
End: 2022-02-09
Payer: MEDICAID

## 2022-02-09 VITALS
WEIGHT: 128 LBS | BODY MASS INDEX: 22.68 KG/M2 | OXYGEN SATURATION: 96 % | RESPIRATION RATE: 16 BRPM | HEART RATE: 58 BPM | DIASTOLIC BLOOD PRESSURE: 62 MMHG | SYSTOLIC BLOOD PRESSURE: 110 MMHG | HEIGHT: 63 IN

## 2022-02-09 DIAGNOSIS — R00.2 PALPITATIONS: ICD-10-CM

## 2022-02-09 PROCEDURE — 99214 OFFICE O/P EST MOD 30 MIN: CPT

## 2022-02-09 PROCEDURE — 93000 ELECTROCARDIOGRAM COMPLETE: CPT

## 2022-02-09 NOTE — REASON FOR VISIT
[FreeTextEntry1] : Patient presents here for  cardiac re- evaluation re management of PAF and MR, SOB/ HFpEF \par \par Has been stable since the last visit.\par Still has complaints of chronic baseline exertional dyspnea and orthopnea.\par There has been no change in comparison to his last visit here 4 months ago\par Rare palpitations.  Some poorly defined abdominal discomfort when supine.\par No edema.\par No increasing shortness of breath.\par \par In [facility] 6/12/2021 to 6/17/2021 with acute gangrenous cholecystitis.\par Underwent an emergency laparoscopic cholecystectomy.\par Cardiology consultation performed may have been with another group.  We were not informed.\par \par His cardiac history is that of:\par Paroxysmal atrial fibrillation as of about two years ago. \par Severe mitral valve insufficiency. \par History of a CVA 2008. \par History of hypertension. \par History of hyperlipidemia.\par \par Patient’s major medical problem at this point in time is advanced prostate cancer for which he is now being treated with hormonal therapy and has an indwelling Silva.  He has had several bouts of hematuria and recent UTI's\par \par Patient developed shortness of breath after his last visit here in October.  The shortness of breath not well defined but seems to be worse when he lays back and with activity.\par \par He stopped taking potassium because tablets were too large\par \par We also elected to begin clopidogrel for the atrial fibrillation.  Issues of falling and hematuria prevented conventional anticoagulation and the patient peptic ulcer disease preempted use of aspirin. 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  He was in [place] when he had his stroke in 2008 and at that time was begun on Coumadin therapy which he had continued until just a year ago.  The bouts of hematuria lead to the discontinuation of Coumadin.  Frequent falls would seem to indicate that there is an additional hazard of that as well. \par \par He has baseline exertional fatigability but moves very slowly with the use of a walker.  \par \par There is no chest pain, palpitations, PND edema.  \par \par The falls all seem to be due to balance issues, not necessarily due to any loss of vision or consciousness.  \par He has been tolerating 25 mg of metoprolol daily without any significant dizziness. \par \par

## 2022-02-09 NOTE — ASSESSMENT
[FreeTextEntry1] :  ECG shows normal sinus rhythm at 58 .  First degree AV block. 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  \par \par Laboratory data \par ----9/16/20------1/4/2021\par Chol---255---------159\par HDL----47-----------48\par LDL---186-----------94\par Trig----100 ----------90\par Electrolytes and LFTs are normal\par Hemoglobin 11.2\par \par Echocardiogram 6/14/2021 [facility]\par LVH with normal systolic function ejection fraction 60 to 65%\par Severe pulmonary hypertension 92 mmHg\par Severe left atrial enlargement\par Severe mitral regurgitation\par Moderately severe tricuspid regurgitation\par \par Echocardiogram 12/10/2020:\par Overall normal left ventricular systolic function.\par Partially flail posterior leaflet mitral valve with severe eccentric mitral valve regurgitation.\par Moderate tricuspid regurgitation\par Severe pulmonary artery hypertension\par \par Event recorder 8/27/2022 9/9/2020\par Sinus rhythm with runs of paroxysmal atrial fibrillation accelerated rates sometimes as high as 150 to 160 bpm noted.  Occasional PVCs are noted.\par \par Impression:\par 1.  Conduction disease: New right bundle branch block pattern noted on ECG.  In addition to first-degree and left anterior fascicular block.  There is no change in there are no symptoms of dizziness.\par \par 2.  Anticoagulation: This has been stopped due to risks caused by recurrent hematuria and frequent falls.  Discussed with the family that this is a risk-benefit analysis and at this point in time, it seems like the appropriate decision, albeit with the risk of embolic CVA because of the a-fib.  Family understands that we cannot exclude the risk of an embolic event because of a-fib.\par Aspirin has not been used because of  recent problem with H. pylori and peptic ulcer disease.  Plavix has not been tried. and tolerated so far\par \par 3.  There is a history of severe mitral regurgitation clearly heard on examination, and this is probably the underlying substrate that contributes to the atrial fibrillation. \par Hospital echocardiogram similar to what we saw here in the office December 2020\par \par 4.  The last echo 9/2019 showed 3-4+ eccentric mitral regurgitation with severe pulmonary hypertension and LVH and normal left ventricular systolic function.  Do not see any major changes on this echocardiogram but nonetheless the patient appears to be symptomatic of heart failure\par \par Plan:\par 1.  Encourage the patient to continue furosemide 20 mg p.o. daily will add powdered potassium\par      Because of concerns about his relatively low blood pressure will continue metoprolol  25 mg a day.\par \par 2.  We will continue rosuvastatin 40 mg p.o. daily given the marked elevation in his  cholesterol/LDL\par \par 3.  Obtain follow-up blood work in 3 months\par \par 4.  Meclizine 25 mg p.o. as needed for the vertigo.\par \par 5.  Continue Plavix 75 mg p.o. daily to least have some form of anticoagulation in the face of PAF and history of     CVA.\par 6.  Blood work and clinical follow-up in 3 months if there are no clinical changes\par \par 7.  Echocardiogram at next office visit\par \par 8. Contact me if there are any new symptoms of shortness of breath, orthopnea or edema.

## 2022-05-13 ENCOUNTER — APPOINTMENT (OUTPATIENT)
Dept: UROLOGY | Facility: CLINIC | Age: 87
End: 2022-05-13

## 2022-05-13 ENCOUNTER — APPOINTMENT (OUTPATIENT)
Dept: UROLOGY | Facility: CLINIC | Age: 87
End: 2022-05-13
Payer: MEDICAID

## 2022-05-13 PROCEDURE — 96402 CHEMO HORMON ANTINEOPL SQ/IM: CPT

## 2022-05-13 RX ORDER — LEUPROLIDE ACETATE 30 MG/.5ML
30 INJECTION, SUSPENSION, EXTENDED RELEASE SUBCUTANEOUS
Refills: 0 | Status: COMPLETED | OUTPATIENT
Start: 2022-05-13

## 2022-05-13 RX ADMIN — Medication 0 MG: at 00:00

## 2022-06-02 ENCOUNTER — APPOINTMENT (OUTPATIENT)
Dept: CARDIOLOGY | Facility: CLINIC | Age: 87
End: 2022-06-02
Payer: MEDICAID

## 2022-06-02 VITALS
SYSTOLIC BLOOD PRESSURE: 110 MMHG | RESPIRATION RATE: 16 BRPM | OXYGEN SATURATION: 98 % | HEART RATE: 63 BPM | DIASTOLIC BLOOD PRESSURE: 74 MMHG | WEIGHT: 130 LBS | BODY MASS INDEX: 23.04 KG/M2 | HEIGHT: 63 IN

## 2022-06-02 DIAGNOSIS — E78.49 OTHER HYPERLIPIDEMIA: ICD-10-CM

## 2022-06-02 DIAGNOSIS — M48.02 SPINAL STENOSIS, CERVICAL REGION: ICD-10-CM

## 2022-06-02 PROCEDURE — 93000 ELECTROCARDIOGRAM COMPLETE: CPT

## 2022-06-02 PROCEDURE — 99214 OFFICE O/P EST MOD 30 MIN: CPT

## 2022-06-02 RX ORDER — ROSUVASTATIN CALCIUM 40 MG/1
40 TABLET, FILM COATED ORAL DAILY
Qty: 90 | Refills: 3 | Status: DISCONTINUED | COMMUNITY
Start: 2020-10-15 | End: 2022-06-02

## 2022-06-02 RX ORDER — POTASSIUM CHLORIDE 1.5 G/1.58G
20 POWDER, FOR SOLUTION ORAL DAILY
Qty: 45 | Refills: 0 | Status: DISCONTINUED | COMMUNITY
Start: 2021-10-14 | End: 2022-06-02

## 2022-06-02 NOTE — ASSESSMENT
[FreeTextEntry1] :  ECG shows normal sinus rhythm at 63  First degree AV block. 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  \par \par Laboratory data \par -----9/16/20-----1/4/2021\par Chol---255----------159\par HDL---- 47-----------48\par LDL----186---------- 94\par Trigl----100--------- 90\par Electrolytes and LFTs are normal\par Hemoglobin 11.2\par \par Echocardiogram 6/14/2021 [facility]\par LVH with normal systolic function ejection fraction 60 to 65%\par Severe pulmonary hypertension 92 mmHg\par Severe left atrial enlargement\par Severe mitral regurgitation\par Moderately severe tricuspid regurgitation\par \par Echocardiogram 12/10/2020:\par Overall normal left ventricular systolic function.\par Partially flail posterior leaflet mitral valve with severe eccentric mitral valve regurgitation.\par Moderate tricuspid regurgitation\par Severe pulmonary artery hypertension\par \par Event recorder 8/27/2022 9/9/2020\par Sinus rhythm with runs of paroxysmal atrial fibrillation accelerated rates sometimes as high as 150 to 160 bpm noted.  Occasional PVCs are noted.\par \par Impression:\par 1.  New right bundle branch block pattern noted on ECG.  In addition to first-degree and left anterior fascicular block.  Still no evidence of symptomatic conduction disease\par \par 2.  Anticoagulation has been stopped again because of the risks caused by recurrent hematuria as well as the frequent falls.  Discussed with the family that this is a risk-benefit analysis and at this point in time, it seems like the appropriate decision, albeit with the risk of embolic CVA because of the a-fib.  Family understands that we cannot exclude the risk of an embolic event because of a-fib.\par Aspirin has not been used because of  recent problem with H. pylori and peptic ulcer disease.  Plavix has not been tried. and tolerated so far\par \par 3.  There is a history of severe mitral regurgitation clearly heard on examination, and this is probably the underlying substrate that contributes to the atrial fibrillation. \par Hospital echocardiogram similar to what we saw here in the office December 2020\par Repeat echocardiogram has been postponed due to some technical scheduling issues\par \par 4.  The last echo 9/2019 showed 3-4+ eccentric mitral regurgitation with severe pulmonary hypertension and LVH and normal left ventricular systolic function.  Do not see any major changes on this echocardiogram but nonetheless the patient appears to be symptomatic of heart failure\par \par Plan:\par 1.  Encourage the patient to continue furosemide 20 mg p.o. daily and will add spironolactone 25 mg daily\par      Because of concerns re: relatively low blood pressure will continue metoprolol  25 mg a day.  No increase\par \par 2.  We will continue rosuvastatin 40 mg p.o. daily given the marked elevation in his  cholesterol/LDL\par \par 3.  Obtain follow-up blood work in 3 months\par \par 4.  Meclizine 25 mg p.o. as needed for the vertigo.\par \par 5.  Continue Plavix 75 mg p.o. daily to least have some form of anticoagulation in the face of PAF and history of     CVA.\par 6.  Blood work and clinical follow-up in 3 months if there are no clinical changes\par \par 7.  Echocardiogram at next office visit\par 7.  Encourage patient to follow-up with surgery and GI with regard to the symptoms of anorexia and diarrhea.  This should be sooner than later.\par Contact me if there are any new symptoms of shortness of breath, orthopnea or edema.

## 2022-06-02 NOTE — REASON FOR VISIT
[FreeTextEntry1] : Patient presents here for  cardiac re- evaluation re management of PAF and MR, SOB/ HFpEF \par Voices concerns about increasing shortness of breath even with minor exertion, possibly over the last 2 months.\par Similar to complaints of shortness of breath December 2020 at which time we started furosemide with improvement. The shortness of breath was not well defined but seems to be worse when he lays back and with activity.\par Patient was initially reluctant to begin furosemide therapy but eventually did December '20 with a substantial improvement in the baseline shortness of breath.\par He stopped taking potassium because tablets were too large\par \par Rare palpitations.  Some poorly defined abdominal discomfort when supine.\par No edema.\par \par In [facility] 6/12/2021 to 6/17/2021 with acute gangrenous cholecystitis.\par Underwent an emergency laparoscopic cholecystectomy.\par Cardiology consultation performed may have been with another group.  We were not informed.\par \par His cardiac history is that of:\par Paroxysmal atrial fibrillation as of about two years ago. \par Severe mitral valve insufficiency. \par History of a CVA 2008. \par History of hypertension. \par History of hyperlipidemia.\par \par Patient’s major medical problem at this point in time is advanced prostate cancer for which he is now being treated with hormonal therapy and has an indwelling Silva.  He has had several bouts of hematuria and recent UTI's\par \par We also elected to begin clopidogrel for the atrial fibrillation.  Issues of falling and hematuria prevented conventional anticoagulation and the patient peptic ulcer disease preempted use of aspirin. 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  He was in [place] when he had his stroke in 2008 and at that time was begun on Coumadin therapy which he had continued until just a year ago.  The bouts of hematuria lead to the discontinuation of Coumadin.  Frequent falls would seem to indicate that there is an additional hazard of that as well. \par \par He has baseline exertional fatigability but moves very slowly with the use of a walker.  \par \par There is no chest pain, palpitations, PND edema.  \par \par The falls all seem to be due to balance issues, not necessarily due to any loss of vision or consciousness.  \par He has been tolerating 25 mg of metoprolol daily without any significant dizziness. \par \par

## 2022-09-19 ENCOUNTER — NON-APPOINTMENT (OUTPATIENT)
Age: 87
End: 2022-09-19

## 2022-09-23 ENCOUNTER — APPOINTMENT (OUTPATIENT)
Dept: UROLOGY | Facility: CLINIC | Age: 87
End: 2022-09-23

## 2022-09-26 ENCOUNTER — RX CHANGE (OUTPATIENT)
Age: 87
End: 2022-09-26

## 2022-09-26 RX ORDER — BICALUTAMIDE 50 MG/1
50 TABLET ORAL DAILY
Qty: 90 | Refills: 3 | Status: DISCONTINUED | COMMUNITY
Start: 2018-08-31 | End: 2022-09-26

## 2022-09-28 ENCOUNTER — APPOINTMENT (OUTPATIENT)
Dept: UROLOGY | Facility: CLINIC | Age: 87
End: 2022-09-28

## 2022-09-28 PROCEDURE — 96402 CHEMO HORMON ANTINEOPL SQ/IM: CPT

## 2022-09-28 RX ORDER — LEUPROLIDE ACETATE 30 MG/.5ML
30 INJECTION, SUSPENSION, EXTENDED RELEASE SUBCUTANEOUS
Refills: 0 | Status: COMPLETED | OUTPATIENT
Start: 2022-09-28

## 2022-09-28 RX ADMIN — LEUPROLIDE ACETATE 0 MG: KIT SUBCUTANEOUS at 00:00

## 2022-10-13 ENCOUNTER — APPOINTMENT (OUTPATIENT)
Dept: CARDIOLOGY | Facility: CLINIC | Age: 87
End: 2022-10-13

## 2022-10-13 VITALS
HEART RATE: 61 BPM | DIASTOLIC BLOOD PRESSURE: 70 MMHG | HEIGHT: 62 IN | WEIGHT: 133 LBS | BODY MASS INDEX: 24.48 KG/M2 | RESPIRATION RATE: 14 BRPM | SYSTOLIC BLOOD PRESSURE: 122 MMHG

## 2022-10-13 PROCEDURE — 93306 TTE W/DOPPLER COMPLETE: CPT

## 2022-10-13 PROCEDURE — 99214 OFFICE O/P EST MOD 30 MIN: CPT | Mod: 25

## 2022-10-13 PROCEDURE — 93000 ELECTROCARDIOGRAM COMPLETE: CPT

## 2022-10-13 RX ORDER — LEUPROLIDE ACETATE 30 MG/.5ML
30 INJECTION, SUSPENSION, EXTENDED RELEASE SUBCUTANEOUS
Refills: 0 | Status: DISCONTINUED | COMMUNITY
Start: 2022-05-13 | End: 2022-10-13

## 2022-10-13 RX ORDER — LEUPROLIDE ACETATE 30 MG/.5ML
30 INJECTION, SUSPENSION, EXTENDED RELEASE SUBCUTANEOUS
Qty: 1 | Refills: 0 | Status: DISCONTINUED | COMMUNITY
Start: 2022-09-28 | End: 2022-10-13

## 2022-10-13 RX ORDER — SPIRONOLACTONE 25 MG/1
25 TABLET ORAL DAILY
Qty: 30 | Refills: 3 | Status: DISCONTINUED | COMMUNITY
Start: 2022-06-02 | End: 2022-10-13

## 2022-10-16 NOTE — REASON FOR VISIT
[FreeTextEntry1] : Patient presents here for  cardiac re- evaluation re management of PAF and MR, SOB/ HFpEF \par Voices concerns about increasing shortness of breath even with minor exertion, possibly over the last 2 months.\par Similar to complaints of shortness of breath December 2020 at which time we started furosemide with improvement. The shortness of breath was not well defined but seems to be worse when he lays back and with activity.\par Patient was initially reluctant to begin furosemide therapy but eventually did December '20 with a substantial improvement in the baseline shortness of breath.\par He stopped taking potassium because tablets were too large\par We added spironolactone 25 mg, over, the patient has yet to take this.\par \par In [facility] 6/12/2021 to 6/17/2021 with acute gangrenous cholecystitis.- emergency laparoscopic cholecystectomy.\par Cardiology consultation performed may have been with another group.  We were not informed.\par \par His cardiac history is that of:\par Paroxysmal atrial fibrillation \par Severe mitral valve insufficiency. \par History of a CVA 2008. \par History of hypertension. \par History of hyperlipidemia.\par \par Patient’s major medical problem at this point in time is advanced prostate cancer for which he is now being treated with hormonal therapy and has an indwelling Silva.  He has had several bouts of hematuria and recent UTI's\par \par We also elected to begin clopidogrel for the atrial fibrillation.  Issues of falling and hematuria prevented conventional anticoagulation and the patient peptic ulcer disease preempted use of aspirin. 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  Frequent falls created an additional anticoagulation has been\par \par He has baseline exertional fatigability but moves very slowly with the use of a walker.  \par \par There is no chest pain, palpitations, PND edema.  \par \par The falls all seem to be due to balance issues, not necessarily due to any loss of vision or consciousness.  \par He has been tolerating 25 mg of metoprolol daily without any significant dizziness. \par \par

## 2022-10-16 NOTE — PHYSICAL EXAM
[Normal Conjunctiva] : the conjunctiva exhibited no abnormalities [Eyelids - No Xanthelasma] : the eyelids demonstrated no xanthelasmas [Normal Oral Mucosa] : normal oral mucosa [No Oral Pallor] : no oral pallor [No Oral Cyanosis] : no oral cyanosis [Normal Jugular Venous A Waves Present] : normal jugular venous A waves present [Normal Jugular Venous V Waves Present] : normal jugular venous V waves present [No Jugular Venous Schneider A Waves] : no jugular venous schneider A waves [Abdomen Soft] : soft [Abdomen Tenderness] : non-tender [Abdomen Mass (___ Cm)] : no abdominal mass palpated [FreeTextEntry1] : Unsteady gait despite the use of a walker [Skin Turgor] : normal skin turgor [] : no rash [Impaired Insight] : insight and judgment were intact [Affect] : the affect was normal [No Anxiety] : not feeling anxious

## 2022-10-16 NOTE — ASSESSMENT
[FreeTextEntry1] :  ECG shows normal sinus rhythm at 61 First degree AV block. 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  \par \par Laboratory data \par -----9/16/20-----1/4/2021--7/22/22\par Chol---255----------159------160\par HDL---- 47-----------48-------41\par LDL----186---------- 94------101\par Hemoglobin 11.2\par \par Echocardiogram 10/13/2022:\par Borderline increase in wall thickness with normal function.\par For trigger ejection action 65 to 70%\par Severe left atrial dilatation\par Moderate tricuspid regurgitation\par Severe mitral valve prolapse and regurgitation\par Severe pulmonary hypertension PA systolic 97 mmHg\par \par Echocardiogram 6/14/2021 [facility]\par LVH with normal systolic function ejection fraction 60 to 65%\par Severe pulmonary hypertension 92 mmHg\par Severe left atrial enlargement\par Severe mitral regurgitation\par Moderately severe tricuspid regurgitation\par \par Echocardiogram 12/10/2020:\par Overall normal left ventricular systolic function.\par Partially flail posterior leaflet mitral valve with severe eccentric mitral valve regurgitation.\par Moderate tricuspid regurgitation\par Severe pulmonary artery hypertension\par \par Event recorder 8/27/2022 9/9/2020\par Sinus rhythm with runs of paroxysmal atrial fibrillation accelerated rates sometimes as high as 150 to 160 bpm noted.  Occasional PVCs are noted.\par \par Impression:\par 1.  Right bundle branch block pattern noted on ECG.  In addition to first-degree and left anterior fascicular block.      Still no evidence of symptomatic conduction disease\par \par 2.  Anticoagulation has been stopped again because of the risks caused by recurrent hematuria and frequent falls.  Discussed with the family that this is a risk-benefit analysis and at this point in time, it seems like the appropriate decision, albeit with the risk of embolic CVA because of the a-fib.  Family understands that we cannot exclude the risk of an embolic event because of a-fib.\par Aspirin has not been used because of  recent problem with H. pylori and peptic ulcer disease.  Plavix has been tolerated so far\par \par 3.  There is a history of severe mitral regurgitation clearly heard on examination, and this is probably the underlying substrate that contributes to the atrial fibrillation. \par \par 4.  The current echocardiogram continues to demonstrate 3-4+ eccentric mitral regurgitation with severe (nearly systemic) pulmonary hypertension and LVH and normal left ventricular systolic function.  Do not see any major changes on this echocardiogram but nonetheless the patient appears to be symptomatic of heart failure\par \par Plan:\par 1.  Encourage the patient to continue furosemide 20 mg p.o. daily and  spironolactone 25 mg q. OD\par      Because of concerns re: relatively low blood pressure will continue metoprolol  25 mg a day.  No increase\par \par 2.  We will continue rosuvastatin 40 mg p.o. daily given the marked elevation in his  cholesterol/LDL\par \par 3.  Obtain follow-up blood work in 3 months\par \par 4.  Meclizine 25 mg p.o. as needed for the vertigo.\par \par 5.  Continue Plavix 75 mg p.o. daily to least have some form of anticoagulation in the face of PAF and history of     CVA.\par 6.  Blood work and clinical follow-up in 3 months if there are no clinical changes\par \par 7.  Echocardiogram on an annual basis\par \par 7.  Encourage patient to follow-up with surgery and GI with regard to the symptoms of anorexia and diarrhea.  This should be sooner than later.\par Contact me if there are any new symptoms of shortness of breath, orthopnea or edema.

## 2022-10-24 LAB
ANION GAP SERPL CALC-SCNC: 10 MMOL/L
BUN SERPL-MCNC: 11 MG/DL
CALCIUM SERPL-MCNC: 9.5 MG/DL
CHLORIDE SERPL-SCNC: 100 MMOL/L
CO2 SERPL-SCNC: 29 MMOL/L
CREAT SERPL-MCNC: 0.99 MG/DL
EGFR: 75 ML/MIN/1.73M2
GLUCOSE SERPL-MCNC: 89 MG/DL
POTASSIUM SERPL-SCNC: 4.1 MMOL/L
SODIUM SERPL-SCNC: 139 MMOL/L

## 2022-10-28 ENCOUNTER — APPOINTMENT (OUTPATIENT)
Dept: UROLOGY | Facility: CLINIC | Age: 87
End: 2022-10-28

## 2022-11-08 ENCOUNTER — EMERGENCY (EMERGENCY)
Facility: HOSPITAL | Age: 87
LOS: 1 days | Discharge: HOME | End: 2022-11-08
Attending: EMERGENCY MEDICINE
Payer: MEDICAID

## 2022-11-08 ENCOUNTER — NON-APPOINTMENT (OUTPATIENT)
Age: 87
End: 2022-11-08

## 2022-11-08 VITALS
TEMPERATURE: 98 F | SYSTOLIC BLOOD PRESSURE: 149 MMHG | DIASTOLIC BLOOD PRESSURE: 74 MMHG | HEART RATE: 69 BPM | OXYGEN SATURATION: 95 % | RESPIRATION RATE: 18 BRPM

## 2022-11-08 VITALS
DIASTOLIC BLOOD PRESSURE: 68 MMHG | RESPIRATION RATE: 16 BRPM | OXYGEN SATURATION: 100 % | WEIGHT: 160.06 LBS | SYSTOLIC BLOOD PRESSURE: 134 MMHG | TEMPERATURE: 98 F | HEART RATE: 64 BPM | HEIGHT: 64 IN

## 2022-11-08 DIAGNOSIS — Z98.890 OTHER SPECIFIED POSTPROCEDURAL STATES: Chronic | ICD-10-CM

## 2022-11-08 LAB
ANION GAP SERPL CALC-SCNC: 13 MMOL/L — SIGNIFICANT CHANGE UP (ref 5–17)
APPEARANCE UR: ABNORMAL
BACTERIA # UR AUTO: ABNORMAL
BILIRUB UR-MCNC: NEGATIVE — SIGNIFICANT CHANGE UP
BUN SERPL-MCNC: 11 MG/DL — SIGNIFICANT CHANGE UP (ref 8–20)
CALCIUM SERPL-MCNC: 8.6 MG/DL — SIGNIFICANT CHANGE UP (ref 8.4–10.5)
CHLORIDE SERPL-SCNC: 95 MMOL/L — LOW (ref 96–108)
CO2 SERPL-SCNC: 22 MMOL/L — SIGNIFICANT CHANGE UP (ref 22–29)
COLOR SPEC: YELLOW — SIGNIFICANT CHANGE UP
CREAT SERPL-MCNC: 0.82 MG/DL — SIGNIFICANT CHANGE UP (ref 0.5–1.3)
DIFF PNL FLD: ABNORMAL
EGFR: 86 ML/MIN/1.73M2 — SIGNIFICANT CHANGE UP
EPI CELLS # UR: SIGNIFICANT CHANGE UP
GLUCOSE SERPL-MCNC: 108 MG/DL — HIGH (ref 70–99)
GLUCOSE UR QL: NEGATIVE — SIGNIFICANT CHANGE UP
KETONES UR-MCNC: NEGATIVE — SIGNIFICANT CHANGE UP
LEUKOCYTE ESTERASE UR-ACNC: ABNORMAL
NITRITE UR-MCNC: POSITIVE
PH UR: 7 — SIGNIFICANT CHANGE UP (ref 5–8)
POTASSIUM SERPL-MCNC: 4.5 MMOL/L — SIGNIFICANT CHANGE UP (ref 3.5–5.3)
POTASSIUM SERPL-SCNC: 4.5 MMOL/L — SIGNIFICANT CHANGE UP (ref 3.5–5.3)
PROT UR-MCNC: 15
RBC CASTS # UR COMP ASSIST: >50 /HPF (ref 0–4)
SODIUM SERPL-SCNC: 129 MMOL/L — LOW (ref 135–145)
SP GR SPEC: 1.01 — SIGNIFICANT CHANGE UP (ref 1.01–1.02)
UROBILINOGEN FLD QL: 1
WBC UR QL: >50 /HPF (ref 0–5)

## 2022-11-08 PROCEDURE — 99284 EMERGENCY DEPT VISIT MOD MDM: CPT

## 2022-11-08 PROCEDURE — 87086 URINE CULTURE/COLONY COUNT: CPT

## 2022-11-08 PROCEDURE — 87186 SC STD MICRODIL/AGAR DIL: CPT

## 2022-11-08 PROCEDURE — 80048 BASIC METABOLIC PNL TOTAL CA: CPT

## 2022-11-08 PROCEDURE — 51702 INSERT TEMP BLADDER CATH: CPT

## 2022-11-08 PROCEDURE — 81001 URINALYSIS AUTO W/SCOPE: CPT

## 2022-11-08 PROCEDURE — 99283 EMERGENCY DEPT VISIT LOW MDM: CPT

## 2022-11-08 PROCEDURE — 87077 CULTURE AEROBIC IDENTIFY: CPT

## 2022-11-08 PROCEDURE — 36415 COLL VENOUS BLD VENIPUNCTURE: CPT

## 2022-11-08 RX ORDER — CEFPODOXIME PROXETIL 100 MG
1 TABLET ORAL
Qty: 14 | Refills: 0
Start: 2022-11-08 | End: 2022-11-14

## 2022-11-08 RX ORDER — CEFPODOXIME PROXETIL 100 MG
200 TABLET ORAL ONCE
Refills: 0 | Status: COMPLETED | OUTPATIENT
Start: 2022-11-08 | End: 2022-11-08

## 2022-11-08 RX ADMIN — Medication 200 MILLIGRAM(S): at 21:00

## 2022-11-08 NOTE — ED PROVIDER NOTE - OBJECTIVE STATEMENT
MAGALY CARRILLO is a 88yo M with PMH chronic Silva s/p prostate CA, MVP, CVA who presents w/ decreased urine output via Silva catheter which started today. States Silva was changed at 9:30 per usual and there was no output. Islva was changed again at 10:30 but still w/ little output and also with blood. PT c/o bladder spasms. Denies fevers/chills, cp/sob, abdominal pain, n/v, blood in stool, numbness, tingling, weakness.

## 2022-11-08 NOTE — ED PROVIDER NOTE - PHYSICAL EXAMINATION
VITAL SIGNS: I have reviewed vital signs  CONSTITUTIONAL:  in no acute distress.  SKIN: Warm, dry, no rash.  HEAD: Normocephalic, atraumatic.  EYES: PERRL, conjunctiva and sclera clear.  ENT: pink & moist mucosa, no pharyngeal erythema  NECK: Supple, non tender. No cervical lymphadenopathy.  CARD: Regular rate and rhythm. No murmurs.   RESP: No wheezes, rales or rhonchi.   ABD:  soft, non-distended, non-tender.   : PT WITH MINER, BLOOD TINGE URINE NOTED IN BAG. BLOOD/CLOTS AROUND THE URETHRA. TTP SUPRAPUBICALLY.   MSK:  Good ROM in upper/lower extremities w/o pain.   NEURO: Alert, oriented. Grossly unremarkable. No focal deficits.   PSYCH: Cooperative, alert & oriented x3

## 2022-11-08 NOTE — ED ADULT TRIAGE NOTE - CHIEF COMPLAINT QUOTE
pt BIBA for pelvic pain, decreased morrison output with blood in tubing. pt states had chronic morrison changed 2x today and then symptoms began

## 2022-11-08 NOTE — ED PROVIDER NOTE - ATTENDING CONTRIBUTION TO CARE
Berenice: I performed a face to face evaluation of this patient and performed a full history and physical examination on the patient.  I agree with the resident's history, physical examination, and plan of the patient unless otherwise noted. My brief assessment is as follows: pmh as documented, 3 years chronic indwelling morrison changed monthly by visiting nurse. changed this morning, not draining much, replaced 2 hours after. having some blood around tip with minimal blood in bag since 10pm. +suprapubic pain. on plavix, no a/c. no flank pain. no vomiting. no other complaints. non toxic, nad, ctab, rrr, suprapubic ttp, no rebound/guarding. +blood around urethral tip.morrison bag with ~200ml orange  urine (not changed since 10am per family) no cvat. neuro intact. will replace morrison with three way, possible cbi pending findings. reassess

## 2022-11-08 NOTE — ED PROVIDER NOTE - NS ED ROS FT
Review of Systems  CONSTITUTIONAL: afebrile w/no diaphoresis or weight changes  SKIN: warm, dry w/ no rash or bleeding  EYES: no changes to vision  ENT: no changes in hearing, no sore throat  RESPIRATORY: no cough or SOB  CARDIAC: no chest pain & no palpitations  GI: no abd pain, nausea, vomiting, diarrhea, constipation, or blood in stool/patti blood  GENITO-URINARY: +DECREASE URINARY OUTPUT, HEMATURIA, PELVIC PAIN.   MUSCULOSKELETAL:  no joint pain, swelling or redness  NEUROLOGIC: no weakness, headache, anesthesia or paresthesias  PSYCH: no anxiety, non suicidal, non homicidal, without hallucinations or depression

## 2022-11-08 NOTE — ED PROVIDER NOTE - PATIENT PORTAL LINK FT
You can access the FollowMyHealth Patient Portal offered by [facility] by registering at the following website: [email or web address]. By joining [facility]’s FollowMyHealth portal, you will also be able to view your health information using other applications (apps) compatible with our system.

## 2022-11-08 NOTE — ED PROVIDER NOTE - NSFOLLOWUPINSTRUCTIONS_ED_ALL_ED_FT
the antibiotics that have been sent to your pharmacy and take them as prescribed.     Urinary Tract Infection    A urinary tract infection (UTI) is an infection of any part of the urinary tract, which includes the kidneys, ureters, bladder, and urethra. Risk factors include ignoring your need to urinate, wiping back to front if female, being an uncircumcised male, and having diabetes or a weak immune system. Symptoms include frequent urination, pain or burning with urination, foul smelling urine, cloudy urine, pain in the lower abdomen, blood in the urine, and fever. If you were prescribed an antibiotic medicine, take it as told by your health care provider. Do not stop taking the antibiotic even if you start to feel better.    SEEK IMMEDIATE MEDICAL CARE IF YOU HAVE ANY OF THE FOLLOWING SYMPTOMS: severe back or abdominal pain, fever, inability to keep fluids or medicine down, dizziness/lightheadedness, or a change in mental status.

## 2022-11-08 NOTE — ED PROVIDER NOTE - CLINICAL SUMMARY MEDICAL DECISION MAKING FREE TEXT BOX
ASSESSMENT:   CLIFFORD CARRILLO is a 86yo M who presented with morrison obstruction and hematuria.     PLAN: Three way placed and bladder irrigated w/ improvement in hematuria. Started on abx.

## 2022-11-09 ENCOUNTER — NON-APPOINTMENT (OUTPATIENT)
Age: 87
End: 2022-11-09

## 2022-11-09 ENCOUNTER — EMERGENCY (EMERGENCY)
Facility: HOSPITAL | Age: 87
LOS: 1 days | Discharge: HOME | End: 2022-11-09
Attending: EMERGENCY MEDICINE
Payer: MEDICAID

## 2022-11-09 VITALS
RESPIRATION RATE: 19 BRPM | HEART RATE: 63 BPM | DIASTOLIC BLOOD PRESSURE: 65 MMHG | OXYGEN SATURATION: 98 % | SYSTOLIC BLOOD PRESSURE: 114 MMHG | HEIGHT: 64 IN | TEMPERATURE: 98 F | WEIGHT: 114.64 LBS

## 2022-11-09 DIAGNOSIS — Z98.890 OTHER SPECIFIED POSTPROCEDURAL STATES: Chronic | ICD-10-CM

## 2022-11-09 LAB
ALBUMIN SERPL ELPH-MCNC: 4 G/DL — SIGNIFICANT CHANGE UP (ref 3.3–5.2)
ALP SERPL-CCNC: 100 U/L — SIGNIFICANT CHANGE UP (ref 40–120)
ALT FLD-CCNC: 8 U/L — SIGNIFICANT CHANGE UP
ANION GAP SERPL CALC-SCNC: 11 MMOL/L — SIGNIFICANT CHANGE UP (ref 5–17)
APPEARANCE UR: ABNORMAL
APTT BLD: 29.6 SEC — SIGNIFICANT CHANGE UP (ref 27.5–35.5)
AST SERPL-CCNC: 20 U/L — SIGNIFICANT CHANGE UP
BACTERIA # UR AUTO: ABNORMAL
BASOPHILS # BLD AUTO: 0.03 K/UL — SIGNIFICANT CHANGE UP (ref 0–0.2)
BASOPHILS NFR BLD AUTO: 0.3 % — SIGNIFICANT CHANGE UP (ref 0–2)
BILIRUB SERPL-MCNC: 1.2 MG/DL — SIGNIFICANT CHANGE UP (ref 0.4–2)
BILIRUB UR-MCNC: NEGATIVE — SIGNIFICANT CHANGE UP
BLD GP AB SCN SERPL QL: SIGNIFICANT CHANGE UP
BUN SERPL-MCNC: 12.1 MG/DL — SIGNIFICANT CHANGE UP (ref 8–20)
CALCIUM SERPL-MCNC: 9 MG/DL — SIGNIFICANT CHANGE UP (ref 8.4–10.5)
CHLORIDE SERPL-SCNC: 91 MMOL/L — LOW (ref 96–108)
CO2 SERPL-SCNC: 26 MMOL/L — SIGNIFICANT CHANGE UP (ref 22–29)
COLOR SPEC: ABNORMAL
CREAT SERPL-MCNC: 0.94 MG/DL — SIGNIFICANT CHANGE UP (ref 0.5–1.3)
DIFF PNL FLD: ABNORMAL
EGFR: 79 ML/MIN/1.73M2 — SIGNIFICANT CHANGE UP
EOSINOPHIL # BLD AUTO: 0.16 K/UL — SIGNIFICANT CHANGE UP (ref 0–0.5)
EOSINOPHIL NFR BLD AUTO: 1.9 % — SIGNIFICANT CHANGE UP (ref 0–6)
EPI CELLS # UR: SIGNIFICANT CHANGE UP
GLUCOSE SERPL-MCNC: 106 MG/DL — HIGH (ref 70–99)
GLUCOSE UR QL: NEGATIVE MG/DL — SIGNIFICANT CHANGE UP
HCT VFR BLD CALC: 34 % — LOW (ref 39–50)
HGB BLD-MCNC: 11.3 G/DL — LOW (ref 13–17)
IMM GRANULOCYTES NFR BLD AUTO: 0.5 % — SIGNIFICANT CHANGE UP (ref 0–0.9)
INR BLD: 1.14 RATIO — SIGNIFICANT CHANGE UP (ref 0.88–1.16)
KETONES UR-MCNC: NEGATIVE — SIGNIFICANT CHANGE UP
LEUKOCYTE ESTERASE UR-ACNC: NEGATIVE — SIGNIFICANT CHANGE UP
LYMPHOCYTES # BLD AUTO: 1.61 K/UL — SIGNIFICANT CHANGE UP (ref 1–3.3)
LYMPHOCYTES # BLD AUTO: 18.7 % — SIGNIFICANT CHANGE UP (ref 13–44)
MCHC RBC-ENTMCNC: 27.1 PG — SIGNIFICANT CHANGE UP (ref 27–34)
MCHC RBC-ENTMCNC: 33.2 GM/DL — SIGNIFICANT CHANGE UP (ref 32–36)
MCV RBC AUTO: 81.5 FL — SIGNIFICANT CHANGE UP (ref 80–100)
MONOCYTES # BLD AUTO: 0.84 K/UL — SIGNIFICANT CHANGE UP (ref 0–0.9)
MONOCYTES NFR BLD AUTO: 9.8 % — SIGNIFICANT CHANGE UP (ref 2–14)
NEUTROPHILS # BLD AUTO: 5.91 K/UL — SIGNIFICANT CHANGE UP (ref 1.8–7.4)
NEUTROPHILS NFR BLD AUTO: 68.8 % — SIGNIFICANT CHANGE UP (ref 43–77)
NITRITE UR-MCNC: NEGATIVE — SIGNIFICANT CHANGE UP
PH UR: 7 — SIGNIFICANT CHANGE UP (ref 5–8)
PLATELET # BLD AUTO: 126 K/UL — LOW (ref 150–400)
POTASSIUM SERPL-MCNC: 4.1 MMOL/L — SIGNIFICANT CHANGE UP (ref 3.5–5.3)
POTASSIUM SERPL-SCNC: 4.1 MMOL/L — SIGNIFICANT CHANGE UP (ref 3.5–5.3)
PROT SERPL-MCNC: 7.2 G/DL — SIGNIFICANT CHANGE UP (ref 6.6–8.7)
PROT UR-MCNC: 500 MG/DL
PROTHROM AB SERPL-ACNC: 13.2 SEC — SIGNIFICANT CHANGE UP (ref 10.5–13.4)
RBC # BLD: 4.17 M/UL — LOW (ref 4.2–5.8)
RBC # FLD: 14.6 % — HIGH (ref 10.3–14.5)
RBC CASTS # UR COMP ASSIST: ABNORMAL /HPF (ref 0–4)
SODIUM SERPL-SCNC: 127 MMOL/L — LOW (ref 135–145)
SP GR SPEC: 1.01 — SIGNIFICANT CHANGE UP (ref 1.01–1.02)
UROBILINOGEN FLD QL: NEGATIVE MG/DL — SIGNIFICANT CHANGE UP
WBC # BLD: 8.59 K/UL — SIGNIFICANT CHANGE UP (ref 3.8–10.5)
WBC # FLD AUTO: 8.59 K/UL — SIGNIFICANT CHANGE UP (ref 3.8–10.5)
WBC UR QL: SIGNIFICANT CHANGE UP /HPF (ref 0–5)

## 2022-11-09 PROCEDURE — 86901 BLOOD TYPING SEROLOGIC RH(D): CPT

## 2022-11-09 PROCEDURE — 85025 COMPLETE CBC W/AUTO DIFF WBC: CPT

## 2022-11-09 PROCEDURE — 36415 COLL VENOUS BLD VENIPUNCTURE: CPT

## 2022-11-09 PROCEDURE — 85730 THROMBOPLASTIN TIME PARTIAL: CPT

## 2022-11-09 PROCEDURE — 80053 COMPREHEN METABOLIC PANEL: CPT

## 2022-11-09 PROCEDURE — 74176 CT ABD & PELVIS W/O CONTRAST: CPT | Mod: 26,MG

## 2022-11-09 PROCEDURE — 86850 RBC ANTIBODY SCREEN: CPT

## 2022-11-09 PROCEDURE — 51700 IRRIGATION OF BLADDER: CPT

## 2022-11-09 PROCEDURE — G1004: CPT

## 2022-11-09 PROCEDURE — 86900 BLOOD TYPING SEROLOGIC ABO: CPT

## 2022-11-09 PROCEDURE — 74176 CT ABD & PELVIS W/O CONTRAST: CPT | Mod: MG

## 2022-11-09 PROCEDURE — 81001 URINALYSIS AUTO W/SCOPE: CPT

## 2022-11-09 PROCEDURE — 99284 EMERGENCY DEPT VISIT MOD MDM: CPT | Mod: 25

## 2022-11-09 PROCEDURE — 99285 EMERGENCY DEPT VISIT HI MDM: CPT

## 2022-11-09 PROCEDURE — 85610 PROTHROMBIN TIME: CPT

## 2022-11-09 NOTE — ED PROVIDER NOTE - OBJECTIVE STATEMENT
86 y/o male with PMHx of Prostate cancer presents to ED c/o hematuria since yesterday. Pt currently being treated for a UTI and now reports dark urine in his Silva bag.

## 2022-11-09 NOTE — ED ADULT TRIAGE NOTE - CHIEF COMPLAINT QUOTE
Pt BIBA sent in by visiting RN for dark blood noted to morrison catheter bag.  Pt currently being treated for UTI; seen at [facility] yesterday and had catheter bag changed due to urine retention.  Pt denies all complaints on arrival.  Pt on plavix

## 2022-11-09 NOTE — ED PROVIDER NOTE - NSFOLLOWUPINSTRUCTIONS_ED_ALL_ED_FT
- Follow up with your doctor within 2-3 days.   - Follow with urology.   - Bring results with you to the appointment.   - Return to the ED for any new or worsening symptoms.

## 2022-11-09 NOTE — ED PROVIDER NOTE - PROGRESS NOTE DETAILS
Keena MORENO: draining clear urine. Called family, someone will meet him at home. Arranged for ambulance.

## 2022-11-09 NOTE — ED PROVIDER NOTE - PATIENT PORTAL LINK FT
You can access the FollowMyHealth Patient Portal offered by [facility] by registering at the following website: [email or web address]. By joining [employer] [facility]’s FollowMyHealth portal, you will also be able to view your health information using other applications (apps) compatible with our system.

## 2022-11-10 ENCOUNTER — APPOINTMENT (OUTPATIENT)
Dept: CARDIOLOGY | Facility: CLINIC | Age: 87
End: 2022-11-10

## 2022-11-10 VITALS
SYSTOLIC BLOOD PRESSURE: 112 MMHG | RESPIRATION RATE: 18 BRPM | DIASTOLIC BLOOD PRESSURE: 71 MMHG | TEMPERATURE: 98 F | HEART RATE: 71 BPM | OXYGEN SATURATION: 98 %

## 2022-11-10 NOTE — ED ADULT NURSE NOTE - CHIEF COMPLAINT QUOTE
Pt BIBA sent in by visiting RN for dark blood noted to morrison catheter bag.  Pt currently being treated for UTI; seen at [facility] yesterday and had catheter bag changed due to urine retention.  Pt denies all complaints on arrival.  Pt on plavix

## 2022-11-11 ENCOUNTER — NON-APPOINTMENT (OUTPATIENT)
Age: 87
End: 2022-11-11

## 2022-11-11 LAB
-  AMIKACIN: SIGNIFICANT CHANGE UP
-  AMOXICILLIN/CLAVULANIC ACID: SIGNIFICANT CHANGE UP
-  AMPICILLIN/SULBACTAM: SIGNIFICANT CHANGE UP
-  AMPICILLIN: SIGNIFICANT CHANGE UP
-  AZTREONAM: SIGNIFICANT CHANGE UP
-  CEFAZOLIN: SIGNIFICANT CHANGE UP
-  CEFEPIME: SIGNIFICANT CHANGE UP
-  CEFOXITIN: SIGNIFICANT CHANGE UP
-  CEFTRIAXONE: SIGNIFICANT CHANGE UP
-  CIPROFLOXACIN: SIGNIFICANT CHANGE UP
-  ERTAPENEM: SIGNIFICANT CHANGE UP
-  GENTAMICIN: SIGNIFICANT CHANGE UP
-  IMIPENEM: SIGNIFICANT CHANGE UP
-  LEVOFLOXACIN: SIGNIFICANT CHANGE UP
-  MEROPENEM: SIGNIFICANT CHANGE UP
-  NITROFURANTOIN: SIGNIFICANT CHANGE UP
-  PIPERACILLIN/TAZOBACTAM: SIGNIFICANT CHANGE UP
-  TOBRAMYCIN: SIGNIFICANT CHANGE UP
-  TRIMETHOPRIM/SULFAMETHOXAZOLE: SIGNIFICANT CHANGE UP
CULTURE RESULTS: SIGNIFICANT CHANGE UP
METHOD TYPE: SIGNIFICANT CHANGE UP
ORGANISM # SPEC MICROSCOPIC CNT: SIGNIFICANT CHANGE UP
ORGANISM # SPEC MICROSCOPIC CNT: SIGNIFICANT CHANGE UP
SPECIMEN SOURCE: SIGNIFICANT CHANGE UP

## 2022-11-25 ENCOUNTER — APPOINTMENT (OUTPATIENT)
Dept: UROLOGY | Facility: CLINIC | Age: 87
End: 2022-11-25

## 2022-11-25 VITALS
BODY MASS INDEX: 24.48 KG/M2 | DIASTOLIC BLOOD PRESSURE: 70 MMHG | OXYGEN SATURATION: 95 % | HEART RATE: 84 BPM | SYSTOLIC BLOOD PRESSURE: 142 MMHG | HEIGHT: 62 IN | WEIGHT: 133 LBS | RESPIRATION RATE: 17 BRPM

## 2022-11-25 PROCEDURE — 99214 OFFICE O/P EST MOD 30 MIN: CPT

## 2022-11-25 NOTE — LETTER BODY
[Dear  ___] : Dear  [unfilled], [Consult Letter:] : I had the pleasure of evaluating your patient, [unfilled]. [Consult Closing:] : Thank you very much for allowing me to participate in the care of this patient.  If you have any questions, please do not hesitate to contact me. [FreeTextEntry1] : Address: [address] [place] [address]\par \par Phone: [phone]

## 2022-11-25 NOTE — HISTORY OF PRESENT ILLNESS
[FreeTextEntry1] : He is an 87 year-old man who is seen today with his wife for prostate cancer, rising PSA level, gross hematuria and urinary retention.  He recently after his visiting nurse change his Silva catheter which appeared to be difficult catheter change he developed gross hematuria and he was admitted to [facility] in the [facility]'s.  He required change of catheter and CBI.  He did not require any blood transfusion or procedures.  Now urine is clear yellow.  Labs showed hematocrit 34, creatinine 0.9, and urine culture showed Klebsiella.  CT scan in November 2022 showed stones or bladder wall calcifications, hyperdense material in the bladder most likely hemorrhage.  Also his PSA levels are increasing.  In July 2022 PSA level was 1.5.  PSA was 0.36 in December 2021.  His last Eligard injection was in September 2022.  He is on bicalutamide and Plavix.\par \par Previous notes: Cystoscopy in May 2019 showed enlarged prostate and trabeculated bladder. Urine cytology was benign. \par \par He went to [facility] for a second opinion which agreed with our management of prostate cancer with leuprolide and Casodex. In November 2018, he was admitted to [facility] for gross hematuria. He was placed on continuous bladder irrigation and received blood transfusion. \par PSA was 98. Prostate biopsy showed high-volume [place] 8 prostate cancer. Bone scan was normal. 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 According to the patient, around 2014, he underwent prostate biopsy in [place] and he was told was negative. In June 2018, urinalysis was negative and PSA level was 98.7. He had a stroke about 10 years ago. Lumbar MRI in the past had shown thickened bladder wall and renal cysts.

## 2022-11-25 NOTE — ASSESSMENT
[FreeTextEntry1] : Urine is yellow now in his Silva catheter and he will continue to change on a monthly basis.  We discussed the difference between microscopic and gross hematuria. Potential benign and malignant urological conditions that can cause hematuria were reviewed. Also, non-urologic causes of hematuria were reviewed. Workup including renal imaging (ultrasonography, CT scan, MRI), urine studies and cystoscopy were reviewed. Workup based on risk stratification including age, degree of hematuria and risk factors were discussed. Risks of cystoscopy were discussed. Patient was made aware that despite adequate workup, the cause for hematuria may not be discovered and continued follow-up is recommended. Patient's questions were answered.  He will undergo cystoscopy.\par PSA level is increasing.  PSA doubling time was discussed.  He is due for repeat PSA level which will be ordered.  If further increase is noted he should stop bicalutamide.  Also subsequently will consider further imaging studies for metastatic work-up.  However recent CT scan did not show any obvious metastasis.\par \par Alireza Vinson MD, FACS\par [facility]\par  of Urology\par \par [address], [address]\[place] [address]\par \par [address], [address]\[place] [address]\par \par Tel: [phone]\par Fax: [phone]

## 2022-11-25 NOTE — PHYSICAL EXAM
[General Appearance - Well Developed] : well developed [General Appearance - Well Nourished] : well nourished [Normal Appearance] : normal appearance [Well Groomed] : well groomed [General Appearance - In No Acute Distress] : no acute distress [Abdomen Soft] : soft [Abdomen Tenderness] : non-tender [Costovertebral Angle Tenderness] : no ~M costovertebral angle tenderness [Urethral Meatus] : meatus normal [Penis Abnormality] : normal circumcised penis [Urinary Bladder Findings] : the bladder was normal on palpation [Scrotum] : the scrotum was normal [Testes Tenderness] : no tenderness of the testes [Testes Mass (___cm)] : there were no testicular masses [] : no respiratory distress [Respiration, Rhythm And Depth] : normal respiratory rhythm and effort [Exaggerated Use Of Accessory Muscles For Inspiration] : no accessory muscle use [FreeTextEntry1] : Using a walker

## 2022-11-30 ENCOUNTER — APPOINTMENT (OUTPATIENT)
Dept: CARDIOLOGY | Facility: CLINIC | Age: 87
End: 2022-11-30

## 2022-11-30 VITALS
SYSTOLIC BLOOD PRESSURE: 120 MMHG | DIASTOLIC BLOOD PRESSURE: 68 MMHG | BODY MASS INDEX: 24.66 KG/M2 | RESPIRATION RATE: 16 BRPM | HEIGHT: 62 IN | WEIGHT: 134 LBS | HEART RATE: 75 BPM | OXYGEN SATURATION: 97 %

## 2022-11-30 DIAGNOSIS — F41.9 ANXIETY DISORDER, UNSPECIFIED: ICD-10-CM

## 2022-11-30 PROCEDURE — 93000 ELECTROCARDIOGRAM COMPLETE: CPT

## 2022-11-30 PROCEDURE — 99214 OFFICE O/P EST MOD 30 MIN: CPT | Mod: 25

## 2022-11-30 NOTE — REASON FOR VISIT
[FreeTextEntry1] : Patient presents here for  cardiac re- evaluation re management of PAF and MR, SOB/ HFpEF \par Recent hospitalization for hematuria.\par Concern during that hospitalization was regarding an elevated troponin of 150.  We were not called to see him.\par \par He has some mild baseline shortness of breath but this has been less concerning to him more recently.\par \par Concerned regarding some gastroesophageal reflux type symptoms that occur predictably after he takes his morning and his evening meds.\par \par In [facility] 6/12/2021 to 6/17/2021 with acute gangrenous cholecystitis.- emergency laparoscopic cholecystectomy.\par Cardiology consultation performed may have been with another group.  We were not informed.\par \par His cardiac history is that of:\par Paroxysmal atrial fibrillation \par Severe mitral valve insufficiency. \par History of a CVA 2008. \par History of hypertension. \par History of hyperlipidemia.\par \par Patient’s major medical problem at this point in time is advanced prostate cancer for which he is now being treated with hormonal therapy and has an indwelling Silva.  He has had several bouts of hematuria and recent UTI's\par \par We also elected to begin clopidogrel for the atrial fibrillation.  Issues of falling and hematuria prevented conventional anticoagulation and the patient peptic ulcer disease preempted use of aspirin. 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  Frequent falls created an additional anticoagulation has been\par \par He has baseline exertional fatigability but moves very slowly with the use of a walker.  \par \par There is no chest pain, palpitations, PND edema.  \par \par The falls all seem to be due to balance issues, not necessarily due to any loss of vision or consciousness.  \par He has been tolerating 25 mg of metoprolol daily without any significant dizziness. \par \par

## 2022-11-30 NOTE — ASSESSMENT
[FreeTextEntry1] :  ECG shows normal sinus rhythm at 75  First degree AV block. 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  \par \par Laboratory data \par -----9/16/20-----1/4/2021--7/22/22\par Chol---255----------159------160\par HDL---- 47-----------48-------41\par LDL----186---------- 94------101\par Hemoglobin 11.2\par \par Echocardiogram 10/13/2022:\par Borderline increase in wall thickness with normal function.\par LV ejection action 65 to 70%\par Severe left atrial dilatation\par Moderate tricuspid regurgitation\par Severe mitral valve prolapse and regurgitation\par Severe pulmonary hypertension PA systolic 97 mmHg\par \par Echocardiogram 6/14/2021 [facility]\par LVH with normal systolic function ejection fraction 60 to 65%\par Severe pulmonary hypertension 92 mmHg\par Severe left atrial enlargement\par Severe mitral regurgitation\par Moderately severe tricuspid regurgitation\par \par Echocardiogram 12/10/2020:\par Overall normal left ventricular systolic function.\par Partially flail posterior leaflet mitral valve with severe eccentric mitral valve regurgitation.\par Moderate tricuspid regurgitation\par Severe pulmonary artery hypertension\par \par Event recorder 8/27/2022 9/9/2020\par Sinus rhythm with runs of paroxysmal atrial fibrillation accelerated rates sometimes as high as 150 to 160 bpm noted.  Occasional PVCs are noted.\par \par Impression:\par 1.  Right bundle branch block pattern noted on ECG.  In addition to first-degree and left anterior fascicular block.           Still no evidence of symptomatic conduction disease\par \par 2.  Anticoagulation has been stopped again because of the risks caused by recurrent hematuria and frequent falls.  Discussed with the family that this is a risk-benefit analysis and at this point in time, it seems like the appropriate decision, albeit with the risk of embolic CVA because of the a-fib.  Family understands that we cannot exclude the risk of an embolic event because of a-fib.\par Aspirin has not been used because of  recent problem with H. pylori and peptic ulcer disease.  Plavix has been tolerated so far\par \par 3.  There is a history of severe mitral regurgitation clearly heard on examination, and this is probably the underlying substrate that contributes to the atrial fibrillation. \par \par 4.  The current echocardiogram continues to demonstrate 3-4+ eccentric mitral regurgitation with severe (nearly systemic) pulmonary hypertension and LVH and normal left ventricular systolic function.  Do not see any major changes on this echocardiogram but nonetheless the patient appears to be symptomatic of heart failure\par \par Plan:\par 1.  Encourage the patient to continue furosemide 20 mg p.o. daily and  spironolactone 25 mg q. OD\par      Because of concerns re: relatively low blood pressure will continue metoprolol  25 mg a day.  No increase\par \par 2.  We will continue rosuvastatin 40 mg p.o. daily given the marked elevation in his  cholesterol/LDL\par \par 3.  Obtain follow-up blood work in 3 months\par \par 4.  Meclizine 25 mg p.o. as needed for the vertigo.\par \par 5.  Continue Plavix 75 mg p.o. daily to least have some form of anticoagulation in the face of PAF and history of     CVA.\par 6.  Blood work and clinical follow-up in 3 months if there are no clinical changes\par \par 7.  Echocardiogram on an annual basis\par \par Contact me if there are any new symptoms of shortness of breath, orthopnea or edema.

## 2022-12-06 ENCOUNTER — NON-APPOINTMENT (OUTPATIENT)
Age: 87
End: 2022-12-06

## 2023-01-01 ENCOUNTER — RESULT REVIEW (OUTPATIENT)
Age: 88
End: 2023-01-01

## 2023-01-01 ENCOUNTER — APPOINTMENT (OUTPATIENT)
Dept: CARDIOLOGY | Facility: CLINIC | Age: 88
End: 2023-01-01

## 2023-01-01 ENCOUNTER — APPOINTMENT (OUTPATIENT)
Age: 88
End: 2023-01-01

## 2023-01-01 ENCOUNTER — APPOINTMENT (OUTPATIENT)
Dept: CARDIOLOGY | Facility: CLINIC | Age: 88
End: 2023-01-01
Payer: MEDICAID

## 2023-01-01 ENCOUNTER — APPOINTMENT (OUTPATIENT)
Dept: MRI IMAGING | Facility: CLINIC | Age: 88
End: 2023-01-01

## 2023-01-01 ENCOUNTER — OUTPATIENT (OUTPATIENT)
Dept: OUTPATIENT SERVICES | Facility: HOSPITAL | Age: 88
LOS: 1 days | Discharge: HOME | End: 2023-01-01

## 2023-01-01 ENCOUNTER — APPOINTMENT (OUTPATIENT)
Dept: MRI IMAGING | Facility: CLINIC | Age: 88
End: 2023-01-01
Payer: MEDICAID

## 2023-01-01 ENCOUNTER — APPOINTMENT (OUTPATIENT)
Dept: NEUROLOGY | Facility: CLINIC | Age: 88
End: 2023-01-01
Payer: MEDICAID

## 2023-01-01 ENCOUNTER — OUTPATIENT (OUTPATIENT)
Dept: OUTPATIENT SERVICES | Facility: HOSPITAL | Age: 88
LOS: 1 days | End: 2023-01-01

## 2023-01-01 ENCOUNTER — APPOINTMENT (OUTPATIENT)
Dept: HEMATOLOGY ONCOLOGY | Facility: CLINIC | Age: 88
End: 2023-01-01

## 2023-01-01 ENCOUNTER — APPOINTMENT (OUTPATIENT)
Dept: UROLOGY | Facility: CLINIC | Age: 88
End: 2023-01-01
Payer: MEDICAID

## 2023-01-01 ENCOUNTER — EMERGENCY (EMERGENCY)
Facility: HOSPITAL | Age: 88
LOS: 1 days | Discharge: HOME | End: 2023-01-01
Attending: STUDENT IN AN ORGANIZED HEALTH CARE EDUCATION/TRAINING PROGRAM
Payer: MEDICAID

## 2023-01-01 ENCOUNTER — APPOINTMENT (OUTPATIENT)
Dept: ORTHOPEDIC SURGERY | Facility: CLINIC | Age: 88
End: 2023-01-01
Payer: MEDICAID

## 2023-01-01 ENCOUNTER — APPOINTMENT (OUTPATIENT)
Dept: ORTHOPEDIC SURGERY | Facility: CLINIC | Age: 88
End: 2023-01-01

## 2023-01-01 ENCOUNTER — EMERGENCY (EMERGENCY)
Facility: HOSPITAL | Age: 88
LOS: 0 days | Discharge: HOME | End: 2023-10-20
Attending: STUDENT IN AN ORGANIZED HEALTH CARE EDUCATION/TRAINING PROGRAM
Payer: MEDICAID

## 2023-01-01 ENCOUNTER — APPOINTMENT (OUTPATIENT)
Dept: HEMATOLOGY ONCOLOGY | Facility: CLINIC | Age: 88
End: 2023-01-01
Payer: MEDICAID

## 2023-01-01 ENCOUNTER — EMERGENCY (EMERGENCY)
Facility: HOSPITAL | Age: 88
LOS: 0 days | Discharge: HOME | End: 2023-12-30
Attending: EMERGENCY MEDICINE
Payer: MEDICAID

## 2023-01-01 VITALS
BODY MASS INDEX: 22.26 KG/M2 | RESPIRATION RATE: 16 BRPM | DIASTOLIC BLOOD PRESSURE: 60 MMHG | SYSTOLIC BLOOD PRESSURE: 110 MMHG | WEIGHT: 121 LBS | OXYGEN SATURATION: 96 % | HEIGHT: 62 IN | HEART RATE: 79 BPM

## 2023-01-01 VITALS
DIASTOLIC BLOOD PRESSURE: 64 MMHG | SYSTOLIC BLOOD PRESSURE: 108 MMHG | WEIGHT: 122 LBS | HEIGHT: 62 IN | BODY MASS INDEX: 22.45 KG/M2

## 2023-01-01 VITALS
HEART RATE: 92 BPM | BODY MASS INDEX: 22.29 KG/M2 | DIASTOLIC BLOOD PRESSURE: 64 MMHG | TEMPERATURE: 98.2 F | WEIGHT: 121.14 LBS | SYSTOLIC BLOOD PRESSURE: 108 MMHG | HEIGHT: 62 IN | OXYGEN SATURATION: 95 %

## 2023-01-01 VITALS
SYSTOLIC BLOOD PRESSURE: 111 MMHG | DIASTOLIC BLOOD PRESSURE: 68 MMHG | OXYGEN SATURATION: 96 % | HEART RATE: 74 BPM | RESPIRATION RATE: 16 BRPM

## 2023-01-01 VITALS
SYSTOLIC BLOOD PRESSURE: 99 MMHG | DIASTOLIC BLOOD PRESSURE: 67 MMHG | TEMPERATURE: 98 F | HEART RATE: 89 BPM | RESPIRATION RATE: 17 BRPM | OXYGEN SATURATION: 99 %

## 2023-01-01 VITALS
TEMPERATURE: 98 F | HEIGHT: 66 IN | DIASTOLIC BLOOD PRESSURE: 61 MMHG | HEART RATE: 85 BPM | RESPIRATION RATE: 18 BRPM | WEIGHT: 125 LBS | SYSTOLIC BLOOD PRESSURE: 134 MMHG | OXYGEN SATURATION: 98 %

## 2023-01-01 VITALS
OXYGEN SATURATION: 96 % | SYSTOLIC BLOOD PRESSURE: 120 MMHG | WEIGHT: 122.7 LBS | DIASTOLIC BLOOD PRESSURE: 69 MMHG | HEIGHT: 62 IN | TEMPERATURE: 97.4 F | BODY MASS INDEX: 22.58 KG/M2

## 2023-01-01 VITALS
OXYGEN SATURATION: 97 % | DIASTOLIC BLOOD PRESSURE: 68 MMHG | SYSTOLIC BLOOD PRESSURE: 103 MMHG | RESPIRATION RATE: 18 BRPM | HEART RATE: 98 BPM

## 2023-01-01 VITALS
OXYGEN SATURATION: 97 % | TEMPERATURE: 98 F | HEART RATE: 81 BPM | SYSTOLIC BLOOD PRESSURE: 115 MMHG | RESPIRATION RATE: 18 BRPM | DIASTOLIC BLOOD PRESSURE: 68 MMHG

## 2023-01-01 VITALS
WEIGHT: 149.91 LBS | HEIGHT: 62.99 IN | TEMPERATURE: 99 F | RESPIRATION RATE: 18 BRPM | HEART RATE: 76 BPM | OXYGEN SATURATION: 96 % | SYSTOLIC BLOOD PRESSURE: 110 MMHG | DIASTOLIC BLOOD PRESSURE: 72 MMHG

## 2023-01-01 VITALS
HEIGHT: 62 IN | HEART RATE: 57 BPM | BODY MASS INDEX: 24.27 KG/M2 | OXYGEN SATURATION: 95 % | WEIGHT: 131.9 LBS | SYSTOLIC BLOOD PRESSURE: 144 MMHG | DIASTOLIC BLOOD PRESSURE: 83 MMHG | TEMPERATURE: 97.2 F

## 2023-01-01 VITALS — BODY MASS INDEX: 21.62 KG/M2 | HEIGHT: 63 IN | WEIGHT: 122 LBS

## 2023-01-01 DIAGNOSIS — C61 MALIGNANT NEOPLASM OF PROSTATE: ICD-10-CM

## 2023-01-01 DIAGNOSIS — Z88.8 ALLERGY STATUS TO OTHER DRUGS, MEDICAMENTS AND BIOLOGICAL SUBSTANCES: ICD-10-CM

## 2023-01-01 DIAGNOSIS — E78.5 HYPERLIPIDEMIA, UNSPECIFIED: ICD-10-CM

## 2023-01-01 DIAGNOSIS — I10 ESSENTIAL (PRIMARY) HYPERTENSION: ICD-10-CM

## 2023-01-01 DIAGNOSIS — Z98.890 OTHER SPECIFIED POSTPROCEDURAL STATES: Chronic | ICD-10-CM

## 2023-01-01 DIAGNOSIS — M21.371 FOOT DROP, RIGHT FOOT: ICD-10-CM

## 2023-01-01 DIAGNOSIS — H81.10 BENIGN PAROXYSMAL VERTIGO, UNSPECIFIED EAR: ICD-10-CM

## 2023-01-01 DIAGNOSIS — I69.359 HEMIPLEGIA AND HEMIPARESIS FOLLOWING CEREBRAL INFARCTION AFFECTING UNSPECIFIED SIDE: ICD-10-CM

## 2023-01-01 DIAGNOSIS — Z86.73 PERSONAL HISTORY OF TRANSIENT ISCHEMIC ATTACK (TIA), AND CEREBRAL INFARCTION WITHOUT RESIDUAL DEFICITS: ICD-10-CM

## 2023-01-01 DIAGNOSIS — F03.90 UNSPECIFIED DEMENTIA, UNSPECIFIED SEVERITY, WITHOUT BEHAVIORAL DISTURBANCE, PSYCHOTIC DISTURBANCE, MOOD DISTURBANCE, AND ANXIETY: ICD-10-CM

## 2023-01-01 DIAGNOSIS — T83.89XA OTHER SPECIFIED COMPLICATION OF GENITOURINARY PROSTHETIC DEVICES, IMPLANTS AND GRAFTS, INITIAL ENCOUNTER: ICD-10-CM

## 2023-01-01 DIAGNOSIS — E05.00 THYROTOXICOSIS WITH DIFFUSE GOITER WITHOUT THYROTOXIC CRISIS OR STORM: ICD-10-CM
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
## 2023-01-01 DIAGNOSIS — T83.9XXA UNSPECIFIED COMPLICATION OF GENITOURINARY PROSTHETIC DEVICE, IMPLANT AND GRAFT, INITIAL ENCOUNTER: ICD-10-CM

## 2023-01-01 DIAGNOSIS — Z79.02 LONG TERM (CURRENT) USE OF ANTITHROMBOTICS/ANTIPLATELETS: ICD-10-CM

## 2023-01-01 DIAGNOSIS — R39.198 OTHER DIFFICULTIES WITH MICTURITION: ICD-10-CM
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
## 2023-01-01 DIAGNOSIS — Z87.898 PERSONAL HISTORY OF OTHER SPECIFIED CONDITIONS: ICD-10-CM

## 2023-01-01 DIAGNOSIS — M48.061 SPINAL STENOSIS, LUMBAR REGION WITHOUT NEUROGENIC CLAUDICATION: ICD-10-CM
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
## 2023-01-01 DIAGNOSIS — G31.84 MILD COGNITIVE IMPAIRMENT, SO STATED: ICD-10-CM

## 2023-01-01 DIAGNOSIS — I95.0 IDIOPATHIC HYPOTENSION: ICD-10-CM

## 2023-01-01 DIAGNOSIS — I63.9 CEREBRAL INFARCTION, UNSPECIFIED: ICD-10-CM

## 2023-01-01 LAB
-  AMIKACIN: SIGNIFICANT CHANGE UP
-  AMIKACIN: SIGNIFICANT CHANGE UP
-  AMPICILLIN: SIGNIFICANT CHANGE UP
-  AMPICILLIN: SIGNIFICANT CHANGE UP
-  AZTREONAM: SIGNIFICANT CHANGE UP
-  AZTREONAM: SIGNIFICANT CHANGE UP
-  CEFEPIME: SIGNIFICANT CHANGE UP
-  CEFEPIME: SIGNIFICANT CHANGE UP
-  CEFTAZIDIME: SIGNIFICANT CHANGE UP
-  CEFTAZIDIME: SIGNIFICANT CHANGE UP
-  CIPROFLOXACIN: SIGNIFICANT CHANGE UP
-  GENTAMICIN: SIGNIFICANT CHANGE UP
-  GENTAMICIN: SIGNIFICANT CHANGE UP
-  IMIPENEM: SIGNIFICANT CHANGE UP
-  IMIPENEM: SIGNIFICANT CHANGE UP
-  LEVOFLOXACIN: SIGNIFICANT CHANGE UP
-  MEROPENEM: SIGNIFICANT CHANGE UP
-  MEROPENEM: SIGNIFICANT CHANGE UP
-  NITROFURANTOIN: SIGNIFICANT CHANGE UP
-  NITROFURANTOIN: SIGNIFICANT CHANGE UP
-  PIPERACILLIN/TAZOBACTAM: SIGNIFICANT CHANGE UP
-  PIPERACILLIN/TAZOBACTAM: SIGNIFICANT CHANGE UP
-  TETRACYCLINE: SIGNIFICANT CHANGE UP
-  TETRACYCLINE: SIGNIFICANT CHANGE UP
-  TOBRAMYCIN: SIGNIFICANT CHANGE UP
-  TOBRAMYCIN: SIGNIFICANT CHANGE UP
-  VANCOMYCIN: SIGNIFICANT CHANGE UP
-  VANCOMYCIN: SIGNIFICANT CHANGE UP
ALBUMIN SERPL ELPH-MCNC: 1.8 G/DL — LOW (ref 3.3–5)
ALBUMIN SERPL ELPH-MCNC: 1.8 G/DL — LOW (ref 3.3–5)
ALBUMIN SERPL ELPH-MCNC: 2.1 G/DL — LOW (ref 3.3–5)
ALBUMIN SERPL ELPH-MCNC: 2.1 G/DL — LOW (ref 3.3–5)
ALBUMIN SERPL ELPH-MCNC: 2.9 G/DL
ALBUMIN SERPL ELPH-MCNC: 2.9 G/DL
ALBUMIN SERPL ELPH-MCNC: 4 G/DL
ALP BLD-CCNC: 124 U/L
ALP BLD-CCNC: 129 U/L
ALP BLD-CCNC: 153 U/L
ALP SERPL-CCNC: 91 U/L — SIGNIFICANT CHANGE UP (ref 40–120)
ALP SERPL-CCNC: 91 U/L — SIGNIFICANT CHANGE UP (ref 40–120)
ALP SERPL-CCNC: 98 U/L — SIGNIFICANT CHANGE UP (ref 40–120)
ALP SERPL-CCNC: 98 U/L — SIGNIFICANT CHANGE UP (ref 40–120)
ALT FLD-CCNC: 13 U/L — SIGNIFICANT CHANGE UP (ref 12–78)
ALT FLD-CCNC: 13 U/L — SIGNIFICANT CHANGE UP (ref 12–78)
ALT FLD-CCNC: 18 U/L — SIGNIFICANT CHANGE UP (ref 12–78)
ALT FLD-CCNC: 18 U/L — SIGNIFICANT CHANGE UP (ref 12–78)
ALT SERPL-CCNC: 20 U/L
ALT SERPL-CCNC: 29 U/L
ALT SERPL-CCNC: 6 U/L
ANION GAP SERPL CALC-SCNC: 13 MMOL/L
ANION GAP SERPL CALC-SCNC: 3 MMOL/L — LOW (ref 5–17)
ANION GAP SERPL CALC-SCNC: 3 MMOL/L — LOW (ref 5–17)
ANION GAP SERPL CALC-SCNC: 6 MMOL/L — SIGNIFICANT CHANGE UP (ref 5–17)
ANION GAP SERPL CALC-SCNC: 6 MMOL/L — SIGNIFICANT CHANGE UP (ref 5–17)
APPEARANCE UR: ABNORMAL
APPEARANCE UR: ABNORMAL
APPEARANCE UR: CLEAR — SIGNIFICANT CHANGE UP
APPEARANCE UR: CLEAR — SIGNIFICANT CHANGE UP
AST SERPL-CCNC: 16 U/L
AST SERPL-CCNC: 18 U/L — SIGNIFICANT CHANGE UP (ref 15–37)
AST SERPL-CCNC: 18 U/L — SIGNIFICANT CHANGE UP (ref 15–37)
AST SERPL-CCNC: 19 U/L — SIGNIFICANT CHANGE UP (ref 15–37)
AST SERPL-CCNC: 19 U/L — SIGNIFICANT CHANGE UP (ref 15–37)
AST SERPL-CCNC: 33 U/L
AST SERPL-CCNC: 36 U/L
BACTERIA # UR AUTO: ABNORMAL
BACTERIA # UR AUTO: ABNORMAL
BACTERIA # UR AUTO: ABNORMAL /HPF
BACTERIA # UR AUTO: ABNORMAL /HPF
BASOPHILS # BLD AUTO: 0 K/UL — SIGNIFICANT CHANGE UP (ref 0–0.2)
BASOPHILS # BLD AUTO: 0.02 K/UL — SIGNIFICANT CHANGE UP (ref 0–0.2)
BASOPHILS # BLD AUTO: 0.02 K/UL — SIGNIFICANT CHANGE UP (ref 0–0.2)
BASOPHILS # BLD AUTO: 0.04 K/UL — SIGNIFICANT CHANGE UP (ref 0–0.2)
BASOPHILS # BLD AUTO: 0.04 K/UL — SIGNIFICANT CHANGE UP (ref 0–0.2)
BASOPHILS # BLD AUTO: 0.1 K/UL — SIGNIFICANT CHANGE UP (ref 0–0.2)
BASOPHILS # BLD AUTO: 0.1 K/UL — SIGNIFICANT CHANGE UP (ref 0–0.2)
BASOPHILS NFR BLD AUTO: 0.3 % — SIGNIFICANT CHANGE UP (ref 0–2)
BASOPHILS NFR BLD AUTO: 0.5 % — SIGNIFICANT CHANGE UP (ref 0–2)
BASOPHILS NFR BLD AUTO: 0.5 % — SIGNIFICANT CHANGE UP (ref 0–2)
BASOPHILS NFR BLD AUTO: 0.6 % — SIGNIFICANT CHANGE UP (ref 0–2)
BASOPHILS NFR BLD AUTO: 0.9 % — SIGNIFICANT CHANGE UP (ref 0–2)
BILIRUB SERPL-MCNC: 0.4 MG/DL
BILIRUB SERPL-MCNC: 0.4 MG/DL
BILIRUB SERPL-MCNC: 0.5 MG/DL — SIGNIFICANT CHANGE UP (ref 0.2–1.2)
BILIRUB SERPL-MCNC: 0.5 MG/DL — SIGNIFICANT CHANGE UP (ref 0.2–1.2)
BILIRUB SERPL-MCNC: 0.6 MG/DL
BILIRUB SERPL-MCNC: 0.6 MG/DL — SIGNIFICANT CHANGE UP (ref 0.2–1.2)
BILIRUB SERPL-MCNC: 0.6 MG/DL — SIGNIFICANT CHANGE UP (ref 0.2–1.2)
BILIRUB UR-MCNC: NEGATIVE — SIGNIFICANT CHANGE UP
BUN SERPL-MCNC: 11 MG/DL
BUN SERPL-MCNC: 13 MG/DL — SIGNIFICANT CHANGE UP (ref 7–23)
CALCIUM SERPL-MCNC: 8.3 MG/DL — LOW (ref 8.5–10.1)
CALCIUM SERPL-MCNC: 8.3 MG/DL — LOW (ref 8.5–10.1)
CALCIUM SERPL-MCNC: 8.7 MG/DL
CALCIUM SERPL-MCNC: 8.7 MG/DL — SIGNIFICANT CHANGE UP (ref 8.5–10.1)
CALCIUM SERPL-MCNC: 8.7 MG/DL — SIGNIFICANT CHANGE UP (ref 8.5–10.1)
CALCIUM SERPL-MCNC: 8.9 MG/DL
CALCIUM SERPL-MCNC: 9.7 MG/DL
CHLORIDE SERPL-SCNC: 100 MMOL/L — SIGNIFICANT CHANGE UP (ref 96–108)
CHLORIDE SERPL-SCNC: 93 MMOL/L
CHLORIDE SERPL-SCNC: 96 MMOL/L
CHLORIDE SERPL-SCNC: 96 MMOL/L
CO2 SERPL-SCNC: 24 MMOL/L
CO2 SERPL-SCNC: 25 MMOL/L
CO2 SERPL-SCNC: 26 MMOL/L
CO2 SERPL-SCNC: 26 MMOL/L — SIGNIFICANT CHANGE UP (ref 22–31)
CO2 SERPL-SCNC: 26 MMOL/L — SIGNIFICANT CHANGE UP (ref 22–31)
CO2 SERPL-SCNC: 31 MMOL/L — SIGNIFICANT CHANGE UP (ref 22–31)
CO2 SERPL-SCNC: 31 MMOL/L — SIGNIFICANT CHANGE UP (ref 22–31)
COLOR SPEC: ABNORMAL
COLOR SPEC: ABNORMAL
COLOR SPEC: YELLOW — SIGNIFICANT CHANGE UP
COLOR SPEC: YELLOW — SIGNIFICANT CHANGE UP
CREAT SERPL-MCNC: 0.65 MG/DL — SIGNIFICANT CHANGE UP (ref 0.5–1.3)
CREAT SERPL-MCNC: 0.65 MG/DL — SIGNIFICANT CHANGE UP (ref 0.5–1.3)
CREAT SERPL-MCNC: 0.71 MG/DL — SIGNIFICANT CHANGE UP (ref 0.5–1.3)
CREAT SERPL-MCNC: 0.71 MG/DL — SIGNIFICANT CHANGE UP (ref 0.5–1.3)
CREAT SERPL-MCNC: 0.77 MG/DL
CREAT SERPL-MCNC: 0.77 MG/DL
CREAT SERPL-MCNC: 0.98 MG/DL
CULTURE RESULTS: SIGNIFICANT CHANGE UP
CULTURE RESULTS: SIGNIFICANT CHANGE UP
DIFF PNL FLD: ABNORMAL
EGFR: 75 ML/MIN/1.73M2
EGFR: 87 ML/MIN/1.73M2
EGFR: 87 ML/MIN/1.73M2
EGFR: 89 ML/MIN/1.73M2 — SIGNIFICANT CHANGE UP
EGFR: 89 ML/MIN/1.73M2 — SIGNIFICANT CHANGE UP
EGFR: 92 ML/MIN/1.73M2 — SIGNIFICANT CHANGE UP
EGFR: 92 ML/MIN/1.73M2 — SIGNIFICANT CHANGE UP
EOSINOPHIL # BLD AUTO: 0.07 K/UL — SIGNIFICANT CHANGE UP (ref 0–0.5)
EOSINOPHIL # BLD AUTO: 0.07 K/UL — SIGNIFICANT CHANGE UP (ref 0–0.5)
EOSINOPHIL # BLD AUTO: 0.1 K/UL — SIGNIFICANT CHANGE UP (ref 0–0.5)
EOSINOPHIL # BLD AUTO: 0.14 K/UL — SIGNIFICANT CHANGE UP (ref 0–0.5)
EOSINOPHIL # BLD AUTO: 0.14 K/UL — SIGNIFICANT CHANGE UP (ref 0–0.5)
EOSINOPHIL # BLD AUTO: 0.2 K/UL — SIGNIFICANT CHANGE UP (ref 0–0.5)
EOSINOPHIL # BLD AUTO: 0.2 K/UL — SIGNIFICANT CHANGE UP (ref 0–0.5)
EOSINOPHIL NFR BLD AUTO: 0.5 % — SIGNIFICANT CHANGE UP (ref 0–6)
EOSINOPHIL NFR BLD AUTO: 0.9 % — SIGNIFICANT CHANGE UP (ref 0–6)
EOSINOPHIL NFR BLD AUTO: 0.9 % — SIGNIFICANT CHANGE UP (ref 0–6)
EOSINOPHIL NFR BLD AUTO: 2.2 % — SIGNIFICANT CHANGE UP (ref 0–6)
EOSINOPHIL NFR BLD AUTO: 2.3 % — SIGNIFICANT CHANGE UP (ref 0–6)
EOSINOPHIL NFR BLD AUTO: 2.3 % — SIGNIFICANT CHANGE UP (ref 0–6)
EOSINOPHIL NFR BLD AUTO: 2.5 % — SIGNIFICANT CHANGE UP (ref 0–6)
EPI CELLS # UR: PRESENT
EPI CELLS # UR: PRESENT
EPI CELLS # UR: SIGNIFICANT CHANGE UP
EPI CELLS # UR: SIGNIFICANT CHANGE UP
GLUCOSE SERPL-MCNC: 104 MG/DL
GLUCOSE SERPL-MCNC: 109 MG/DL
GLUCOSE SERPL-MCNC: 92 MG/DL
GLUCOSE SERPL-MCNC: 92 MG/DL — SIGNIFICANT CHANGE UP (ref 70–99)
GLUCOSE SERPL-MCNC: 92 MG/DL — SIGNIFICANT CHANGE UP (ref 70–99)
GLUCOSE SERPL-MCNC: 96 MG/DL — SIGNIFICANT CHANGE UP (ref 70–99)
GLUCOSE SERPL-MCNC: 96 MG/DL — SIGNIFICANT CHANGE UP (ref 70–99)
GLUCOSE UR QL: NEGATIVE MG/DL — SIGNIFICANT CHANGE UP
HCT VFR BLD CALC: 29.8 % — LOW (ref 39–50)
HCT VFR BLD CALC: 29.8 % — LOW (ref 39–50)
HCT VFR BLD CALC: 31.6 % — LOW (ref 39–50)
HCT VFR BLD CALC: 31.6 % — LOW (ref 39–50)
HCT VFR BLD CALC: 33.2 % — LOW (ref 39–50)
HCT VFR BLD CALC: 35.1 % — LOW (ref 39–50)
HCT VFR BLD CALC: 37.4 % — LOW (ref 39–50)
HGB BLD-MCNC: 10.9 G/DL — LOW (ref 13–17)
HGB BLD-MCNC: 11.1 G/DL — LOW (ref 13–17)
HGB BLD-MCNC: 12.2 G/DL — LOW (ref 13–17)
HGB BLD-MCNC: 9.3 G/DL — LOW (ref 13–17)
HGB BLD-MCNC: 9.3 G/DL — LOW (ref 13–17)
HGB BLD-MCNC: 9.4 G/DL — LOW (ref 13–17)
HGB BLD-MCNC: 9.4 G/DL — LOW (ref 13–17)
IMM GRANULOCYTES NFR BLD AUTO: 0.2 % — SIGNIFICANT CHANGE UP (ref 0–0.9)
IMM GRANULOCYTES NFR BLD AUTO: 0.2 % — SIGNIFICANT CHANGE UP (ref 0–0.9)
IMM GRANULOCYTES NFR BLD AUTO: 0.4 % — SIGNIFICANT CHANGE UP (ref 0–0.9)
IMM GRANULOCYTES NFR BLD AUTO: 0.4 % — SIGNIFICANT CHANGE UP (ref 0–0.9)
KETONES UR-MCNC: NEGATIVE MG/DL — SIGNIFICANT CHANGE UP
KETONES UR-MCNC: NEGATIVE MG/DL — SIGNIFICANT CHANGE UP
KETONES UR-MCNC: NEGATIVE — SIGNIFICANT CHANGE UP
KETONES UR-MCNC: NEGATIVE — SIGNIFICANT CHANGE UP
LEUKOCYTE ESTERASE UR-ACNC: ABNORMAL
LIDOCAIN IGE QN: 39 U/L — SIGNIFICANT CHANGE UP (ref 13–75)
LIDOCAIN IGE QN: 39 U/L — SIGNIFICANT CHANGE UP (ref 13–75)
LYMPHOCYTES # BLD AUTO: 0.9 K/UL — LOW (ref 1–3.3)
LYMPHOCYTES # BLD AUTO: 1.04 K/UL — SIGNIFICANT CHANGE UP (ref 1–3.3)
LYMPHOCYTES # BLD AUTO: 1.04 K/UL — SIGNIFICANT CHANGE UP (ref 1–3.3)
LYMPHOCYTES # BLD AUTO: 1.18 K/UL — SIGNIFICANT CHANGE UP (ref 1–3.3)
LYMPHOCYTES # BLD AUTO: 1.18 K/UL — SIGNIFICANT CHANGE UP (ref 1–3.3)
LYMPHOCYTES # BLD AUTO: 1.3 K/UL — SIGNIFICANT CHANGE UP (ref 1–3.3)
LYMPHOCYTES # BLD AUTO: 1.8 K/UL — SIGNIFICANT CHANGE UP (ref 1–3.3)
LYMPHOCYTES # BLD AUTO: 13.2 % — SIGNIFICANT CHANGE UP (ref 13–44)
LYMPHOCYTES # BLD AUTO: 13.2 % — SIGNIFICANT CHANGE UP (ref 13–44)
LYMPHOCYTES # BLD AUTO: 18.4 % — SIGNIFICANT CHANGE UP (ref 13–44)
LYMPHOCYTES # BLD AUTO: 19.7 % — SIGNIFICANT CHANGE UP (ref 13–44)
LYMPHOCYTES # BLD AUTO: 19.7 % — SIGNIFICANT CHANGE UP (ref 13–44)
LYMPHOCYTES # BLD AUTO: 21.3 % — SIGNIFICANT CHANGE UP (ref 13–44)
LYMPHOCYTES # BLD AUTO: 8.5 % — LOW (ref 13–44)
MCHC RBC-ENTMCNC: 22.4 PG — LOW (ref 27–34)
MCHC RBC-ENTMCNC: 22.4 PG — LOW (ref 27–34)
MCHC RBC-ENTMCNC: 23.6 PG — LOW (ref 27–34)
MCHC RBC-ENTMCNC: 23.6 PG — LOW (ref 27–34)
MCHC RBC-ENTMCNC: 24.3 PG — LOW (ref 27–34)
MCHC RBC-ENTMCNC: 26.7 PG — LOW (ref 27–34)
MCHC RBC-ENTMCNC: 27.2 PG — SIGNIFICANT CHANGE UP (ref 27–34)
MCHC RBC-ENTMCNC: 29.7 G/DL — LOW (ref 32–36)
MCHC RBC-ENTMCNC: 29.7 G/DL — LOW (ref 32–36)
MCHC RBC-ENTMCNC: 31.2 G/DL — LOW (ref 32–36)
MCHC RBC-ENTMCNC: 31.2 G/DL — LOW (ref 32–36)
MCHC RBC-ENTMCNC: 31.8 G/DL — LOW (ref 32–36)
MCHC RBC-ENTMCNC: 32.6 G/DL — SIGNIFICANT CHANGE UP (ref 32–36)
MCHC RBC-ENTMCNC: 32.8 G/DL — SIGNIFICANT CHANGE UP (ref 32–36)
MCV RBC AUTO: 75.4 FL — LOW (ref 80–100)
MCV RBC AUTO: 75.4 FL — LOW (ref 80–100)
MCV RBC AUTO: 75.6 FL — LOW (ref 80–100)
MCV RBC AUTO: 75.6 FL — LOW (ref 80–100)
MCV RBC AUTO: 76.4 FL — LOW (ref 80–100)
MCV RBC AUTO: 81.4 FL — SIGNIFICANT CHANGE UP (ref 80–100)
MCV RBC AUTO: 83.4 FL — SIGNIFICANT CHANGE UP (ref 80–100)
METHOD TYPE: SIGNIFICANT CHANGE UP
MONOCYTES # BLD AUTO: 0.58 K/UL — SIGNIFICANT CHANGE UP (ref 0–0.9)
MONOCYTES # BLD AUTO: 0.58 K/UL — SIGNIFICANT CHANGE UP (ref 0–0.9)
MONOCYTES # BLD AUTO: 0.6 K/UL — SIGNIFICANT CHANGE UP (ref 0–0.9)
MONOCYTES # BLD AUTO: 0.6 K/UL — SIGNIFICANT CHANGE UP (ref 0–0.9)
MONOCYTES # BLD AUTO: 0.65 K/UL — SIGNIFICANT CHANGE UP (ref 0–0.9)
MONOCYTES # BLD AUTO: 0.65 K/UL — SIGNIFICANT CHANGE UP (ref 0–0.9)
MONOCYTES # BLD AUTO: 0.7 K/UL — SIGNIFICANT CHANGE UP (ref 0–0.9)
MONOCYTES NFR BLD AUTO: 6.2 % — SIGNIFICANT CHANGE UP (ref 2–14)
MONOCYTES NFR BLD AUTO: 7.9 % — SIGNIFICANT CHANGE UP (ref 2–14)
MONOCYTES NFR BLD AUTO: 8.2 % — SIGNIFICANT CHANGE UP (ref 2–14)
MONOCYTES NFR BLD AUTO: 8.2 % — SIGNIFICANT CHANGE UP (ref 2–14)
MONOCYTES NFR BLD AUTO: 8.4 % — SIGNIFICANT CHANGE UP (ref 2–14)
MONOCYTES NFR BLD AUTO: 9.7 % — SIGNIFICANT CHANGE UP (ref 2–14)
MONOCYTES NFR BLD AUTO: 9.7 % — SIGNIFICANT CHANGE UP (ref 2–14)
NEUTROPHILS # BLD AUTO: 4.07 K/UL — SIGNIFICANT CHANGE UP (ref 1.8–7.4)
NEUTROPHILS # BLD AUTO: 4.07 K/UL — SIGNIFICANT CHANGE UP (ref 1.8–7.4)
NEUTROPHILS # BLD AUTO: 5 K/UL — SIGNIFICANT CHANGE UP (ref 1.8–7.4)
NEUTROPHILS # BLD AUTO: 5.9 K/UL — SIGNIFICANT CHANGE UP (ref 1.8–7.4)
NEUTROPHILS # BLD AUTO: 6.05 K/UL — SIGNIFICANT CHANGE UP (ref 1.8–7.4)
NEUTROPHILS # BLD AUTO: 6.05 K/UL — SIGNIFICANT CHANGE UP (ref 1.8–7.4)
NEUTROPHILS # BLD AUTO: 8.6 K/UL — HIGH (ref 1.8–7.4)
NEUTROPHILS NFR BLD AUTO: 67.7 % — SIGNIFICANT CHANGE UP (ref 43–77)
NEUTROPHILS NFR BLD AUTO: 67.8 % — SIGNIFICANT CHANGE UP (ref 43–77)
NEUTROPHILS NFR BLD AUTO: 67.8 % — SIGNIFICANT CHANGE UP (ref 43–77)
NEUTROPHILS NFR BLD AUTO: 70.1 % — SIGNIFICANT CHANGE UP (ref 43–77)
NEUTROPHILS NFR BLD AUTO: 76.8 % — SIGNIFICANT CHANGE UP (ref 43–77)
NEUTROPHILS NFR BLD AUTO: 76.8 % — SIGNIFICANT CHANGE UP (ref 43–77)
NEUTROPHILS NFR BLD AUTO: 84.4 % — HIGH (ref 43–77)
NITRITE UR-MCNC: NEGATIVE — SIGNIFICANT CHANGE UP
NRBC # BLD: 0 /100 WBCS — SIGNIFICANT CHANGE UP (ref 0–0)
ORGANISM # SPEC MICROSCOPIC CNT: SIGNIFICANT CHANGE UP
PH UR: 6 — SIGNIFICANT CHANGE UP (ref 5–8)
PH UR: 6 — SIGNIFICANT CHANGE UP (ref 5–8)
PH UR: 6.5 — SIGNIFICANT CHANGE UP (ref 5–8)
PH UR: 6.5 — SIGNIFICANT CHANGE UP (ref 5–8)
PLATELET # BLD AUTO: 185 K/UL — SIGNIFICANT CHANGE UP (ref 150–400)
PLATELET # BLD AUTO: 235 K/UL — SIGNIFICANT CHANGE UP (ref 150–400)
PLATELET # BLD AUTO: 235 K/UL — SIGNIFICANT CHANGE UP (ref 150–400)
PLATELET # BLD AUTO: 243 K/UL — SIGNIFICANT CHANGE UP (ref 150–400)
PLATELET # BLD AUTO: 299 K/UL — SIGNIFICANT CHANGE UP (ref 150–400)
PLATELET # BLD AUTO: 408 K/UL — HIGH (ref 150–400)
PLATELET # BLD AUTO: 408 K/UL — HIGH (ref 150–400)
POTASSIUM SERPL-MCNC: 4 MMOL/L — SIGNIFICANT CHANGE UP (ref 3.5–5.3)
POTASSIUM SERPL-MCNC: 4 MMOL/L — SIGNIFICANT CHANGE UP (ref 3.5–5.3)
POTASSIUM SERPL-MCNC: 4.2 MMOL/L — SIGNIFICANT CHANGE UP (ref 3.5–5.3)
POTASSIUM SERPL-MCNC: 4.2 MMOL/L — SIGNIFICANT CHANGE UP (ref 3.5–5.3)
POTASSIUM SERPL-SCNC: 4 MMOL/L — SIGNIFICANT CHANGE UP (ref 3.5–5.3)
POTASSIUM SERPL-SCNC: 4 MMOL/L — SIGNIFICANT CHANGE UP (ref 3.5–5.3)
POTASSIUM SERPL-SCNC: 4.2 MMOL/L — SIGNIFICANT CHANGE UP (ref 3.5–5.3)
POTASSIUM SERPL-SCNC: 4.2 MMOL/L — SIGNIFICANT CHANGE UP (ref 3.5–5.3)
POTASSIUM SERPL-SCNC: 4.3 MMOL/L
POTASSIUM SERPL-SCNC: 4.4 MMOL/L
POTASSIUM SERPL-SCNC: 4.7 MMOL/L
PROT SERPL-MCNC: 6.6 G/DL
PROT SERPL-MCNC: 6.9 G/DL
PROT SERPL-MCNC: 7.1 G/DL
PROT SERPL-MCNC: 7.3 GM/DL — SIGNIFICANT CHANGE UP (ref 6–8.3)
PROT SERPL-MCNC: 7.3 GM/DL — SIGNIFICANT CHANGE UP (ref 6–8.3)
PROT SERPL-MCNC: 7.5 GM/DL — SIGNIFICANT CHANGE UP (ref 6–8.3)
PROT SERPL-MCNC: 7.5 GM/DL — SIGNIFICANT CHANGE UP (ref 6–8.3)
PROT UR-MCNC: 30 MG/DL
PROT UR-MCNC: 30 MG/DL
PROT UR-MCNC: NEGATIVE MG/DL — SIGNIFICANT CHANGE UP
PROT UR-MCNC: NEGATIVE MG/DL — SIGNIFICANT CHANGE UP
PSA FLD-MCNC: 0.11 NG/ML — SIGNIFICANT CHANGE UP (ref 0–4)
PSA FLD-MCNC: 0.11 NG/ML — SIGNIFICANT CHANGE UP (ref 0–4)
PSA SERPL-MCNC: 0.18 NG/ML
PSA SERPL-MCNC: 0.32 NG/ML
PSA SERPL-MCNC: 0.46 NG/ML
RBC # BLD: 3.94 M/UL — LOW (ref 4.2–5.8)
RBC # BLD: 3.94 M/UL — LOW (ref 4.2–5.8)
RBC # BLD: 4.08 M/UL — LOW (ref 4.2–5.8)
RBC # BLD: 4.19 M/UL — LOW (ref 4.2–5.8)
RBC # BLD: 4.19 M/UL — LOW (ref 4.2–5.8)
RBC # BLD: 4.48 M/UL — SIGNIFICANT CHANGE UP (ref 4.2–5.8)
RBC # BLD: 4.59 M/UL — SIGNIFICANT CHANGE UP (ref 4.2–5.8)
RBC # FLD: 14.7 % — HIGH (ref 10.3–14.5)
RBC # FLD: 15.3 % — HIGH (ref 10.3–14.5)
RBC # FLD: 15.4 % — HIGH (ref 10.3–14.5)
RBC # FLD: 18.6 % — HIGH (ref 10.3–14.5)
RBC # FLD: 18.6 % — HIGH (ref 10.3–14.5)
RBC # FLD: 18.8 % — HIGH (ref 10.3–14.5)
RBC # FLD: 18.8 % — HIGH (ref 10.3–14.5)
RBC CASTS # UR COMP ASSIST: ABNORMAL /HPF (ref 0–4)
RBC CASTS # UR COMP ASSIST: ABNORMAL /HPF (ref 0–4)
RBC CASTS # UR COMP ASSIST: SIGNIFICANT CHANGE UP /HPF (ref 0–4)
RBC CASTS # UR COMP ASSIST: SIGNIFICANT CHANGE UP /HPF (ref 0–4)
SODIUM SERPL-SCNC: 131 MMOL/L
SODIUM SERPL-SCNC: 132 MMOL/L — LOW (ref 135–145)
SODIUM SERPL-SCNC: 132 MMOL/L — LOW (ref 135–145)
SODIUM SERPL-SCNC: 133 MMOL/L
SODIUM SERPL-SCNC: 134 MMOL/L — LOW (ref 135–145)
SODIUM SERPL-SCNC: 134 MMOL/L — LOW (ref 135–145)
SODIUM SERPL-SCNC: 135 MMOL/L
SP GR SPEC: 1 — LOW (ref 1.01–1.02)
SP GR SPEC: 1 — LOW (ref 1.01–1.02)
SP GR SPEC: 1 — SIGNIFICANT CHANGE UP (ref 1–1.03)
SP GR SPEC: 1 — SIGNIFICANT CHANGE UP (ref 1–1.03)
SPECIMEN SOURCE: SIGNIFICANT CHANGE UP
SPECIMEN SOURCE: SIGNIFICANT CHANGE UP
TESTOST FREE SERPL-MCNC: 1.5 PG/ML
TESTOST FREE SERPL-MCNC: 4.4 PG/ML
TESTOST SERPL-MCNC: 10.3 NG/DL
TESTOST SERPL-MCNC: 15.6 NG/DL
UROBILINOGEN FLD QL: 0.2 MG/DL — SIGNIFICANT CHANGE UP (ref 0.2–1)
UROBILINOGEN FLD QL: 0.2 MG/DL — SIGNIFICANT CHANGE UP (ref 0.2–1)
UROBILINOGEN FLD QL: NEGATIVE MG/DL — SIGNIFICANT CHANGE UP
UROBILINOGEN FLD QL: NEGATIVE MG/DL — SIGNIFICANT CHANGE UP
WBC # BLD: 10.2 K/UL — SIGNIFICANT CHANGE UP (ref 3.8–10.5)
WBC # BLD: 6 K/UL — SIGNIFICANT CHANGE UP (ref 3.8–10.5)
WBC # BLD: 6 K/UL — SIGNIFICANT CHANGE UP (ref 3.8–10.5)
WBC # BLD: 7.1 K/UL — SIGNIFICANT CHANGE UP (ref 3.8–10.5)
WBC # BLD: 7.88 K/UL — SIGNIFICANT CHANGE UP (ref 3.8–10.5)
WBC # BLD: 7.88 K/UL — SIGNIFICANT CHANGE UP (ref 3.8–10.5)
WBC # BLD: 8.7 K/UL — SIGNIFICANT CHANGE UP (ref 3.8–10.5)
WBC # FLD AUTO: 10.2 K/UL — SIGNIFICANT CHANGE UP (ref 3.8–10.5)
WBC # FLD AUTO: 6 K/UL — SIGNIFICANT CHANGE UP (ref 3.8–10.5)
WBC # FLD AUTO: 6 K/UL — SIGNIFICANT CHANGE UP (ref 3.8–10.5)
WBC # FLD AUTO: 7.1 K/UL — SIGNIFICANT CHANGE UP (ref 3.8–10.5)
WBC # FLD AUTO: 7.88 K/UL — SIGNIFICANT CHANGE UP (ref 3.8–10.5)
WBC # FLD AUTO: 7.88 K/UL — SIGNIFICANT CHANGE UP (ref 3.8–10.5)
WBC # FLD AUTO: 8.7 K/UL — SIGNIFICANT CHANGE UP (ref 3.8–10.5)
WBC UR QL: ABNORMAL
WBC UR QL: ABNORMAL
WBC UR QL: SIGNIFICANT CHANGE UP /HPF (ref 0–5)
WBC UR QL: SIGNIFICANT CHANGE UP /HPF (ref 0–5)

## 2023-01-01 PROCEDURE — 96402 CHEMO HORMON ANTINEOPL SQ/IM: CPT

## 2023-01-01 PROCEDURE — 99214 OFFICE O/P EST MOD 30 MIN: CPT

## 2023-01-01 PROCEDURE — 99215 OFFICE O/P EST HI 40 MIN: CPT

## 2023-01-01 PROCEDURE — 72100 X-RAY EXAM L-S SPINE 2/3 VWS: CPT

## 2023-01-01 PROCEDURE — 73610 X-RAY EXAM OF ANKLE: CPT | Mod: RT

## 2023-01-01 PROCEDURE — 72148 MRI LUMBAR SPINE W/O DYE: CPT

## 2023-01-01 PROCEDURE — 74176 CT ABD & PELVIS W/O CONTRAST: CPT | Mod: 26,MG

## 2023-01-01 PROCEDURE — G1004: CPT

## 2023-01-01 PROCEDURE — 99284 EMERGENCY DEPT VISIT MOD MDM: CPT

## 2023-01-01 PROCEDURE — 99214 OFFICE O/P EST MOD 30 MIN: CPT | Mod: 25

## 2023-01-01 PROCEDURE — 93000 ELECTROCARDIOGRAM COMPLETE: CPT

## 2023-01-01 PROCEDURE — 99213 OFFICE O/P EST LOW 20 MIN: CPT | Mod: 25

## 2023-01-01 PROCEDURE — 74176 CT ABD & PELVIS W/O CONTRAST: CPT | Mod: MG

## 2023-01-01 PROCEDURE — 72170 X-RAY EXAM OF PELVIS: CPT

## 2023-01-01 PROCEDURE — 99204 OFFICE O/P NEW MOD 45 MIN: CPT

## 2023-01-01 PROCEDURE — 70553 MRI BRAIN STEM W/O & W/DYE: CPT | Mod: 26

## 2023-01-01 PROCEDURE — 99284 EMERGENCY DEPT VISIT MOD MDM: CPT | Mod: 25

## 2023-01-01 PROCEDURE — 51702 INSERT TEMP BLADDER CATH: CPT

## 2023-01-01 PROCEDURE — 99285 EMERGENCY DEPT VISIT HI MDM: CPT | Mod: 25

## 2023-01-01 PROCEDURE — 99213 OFFICE O/P EST LOW 20 MIN: CPT | Mod: 95

## 2023-01-01 RX ORDER — OXYBUTYNIN CHLORIDE 10 MG/1
10 TABLET, EXTENDED RELEASE ORAL
Qty: 30 | Refills: 2 | Status: DISCONTINUED | COMMUNITY
Start: 2022-11-25 | End: 2023-01-01

## 2023-01-01 RX ORDER — ESCITALOPRAM OXALATE 10 MG/1
10 TABLET ORAL
Refills: 0 | Status: ACTIVE | COMMUNITY

## 2023-01-01 RX ORDER — METOPROLOL SUCCINATE 25 MG/1
25 TABLET, EXTENDED RELEASE ORAL DAILY
Qty: 90 | Refills: 3 | Status: ACTIVE | COMMUNITY
Start: 2020-09-04 | End: 1900-01-01

## 2023-01-01 RX ORDER — BICALUTAMIDE 50 MG/1
50 TABLET ORAL
Qty: 90 | Refills: 3 | Status: DISCONTINUED | COMMUNITY
Start: 2022-09-26 | End: 2023-01-01

## 2023-01-01 RX ORDER — LEUPROLIDE ACETATE 45 MG/.375ML
45 INJECTION, SUSPENSION, EXTENDED RELEASE SUBCUTANEOUS
Refills: 0 | Status: COMPLETED | OUTPATIENT
Start: 2023-01-01

## 2023-01-01 RX ORDER — SODIUM CHLORIDE 9 MG/ML
1000 INJECTION INTRAMUSCULAR; INTRAVENOUS; SUBCUTANEOUS ONCE
Refills: 0 | Status: DISCONTINUED | OUTPATIENT
Start: 2023-01-01 | End: 2023-01-01

## 2023-01-01 RX ORDER — LEUPROLIDE ACETATE 45 MG/.375ML
45 INJECTION, SUSPENSION, EXTENDED RELEASE SUBCUTANEOUS
Qty: 1 | Refills: 0 | Status: ACTIVE | COMMUNITY
Start: 2023-01-01

## 2023-01-01 RX ORDER — ENZALUTAMIDE 40 MG/1
40 CAPSULE ORAL DAILY
Qty: 120 | Refills: 6 | Status: DISCONTINUED | COMMUNITY
Start: 2023-03-08 | End: 2023-01-01

## 2023-01-01 RX ORDER — ESCITALOPRAM OXALATE 5 MG/1
5 TABLET ORAL DAILY
Qty: 90 | Refills: 3 | Status: DISCONTINUED | COMMUNITY
End: 2023-01-01

## 2023-01-01 RX ORDER — SPIRONOLACTONE 25 MG/1
25 TABLET ORAL
Qty: 45 | Refills: 0 | Status: DISCONTINUED | COMMUNITY
Start: 2022-10-13 | End: 2023-01-01

## 2023-01-01 RX ORDER — ATORVASTATIN CALCIUM 40 MG/1
40 TABLET, FILM COATED ORAL DAILY
Qty: 90 | Refills: 0 | Status: ACTIVE | COMMUNITY
Start: 1900-01-01 | End: 1900-01-01

## 2023-01-01 RX ORDER — CLOPIDOGREL BISULFATE 75 MG/1
75 TABLET, FILM COATED ORAL DAILY
Qty: 90 | Refills: 3 | Status: ACTIVE | COMMUNITY
Start: 2020-10-15 | End: 1900-01-01

## 2023-01-01 RX ORDER — SODIUM CHLORIDE 9 MG/ML
1000 INJECTION INTRAMUSCULAR; INTRAVENOUS; SUBCUTANEOUS ONCE
Refills: 0 | Status: COMPLETED | OUTPATIENT
Start: 2023-01-01 | End: 2023-01-01

## 2023-01-01 RX ADMIN — SODIUM CHLORIDE 1000 MILLILITER(S): 9 INJECTION INTRAMUSCULAR; INTRAVENOUS; SUBCUTANEOUS at 17:13

## 2023-01-01 RX ADMIN — LEUPROLIDE ACETATE 0 MG: KIT SUBCUTANEOUS at 00:00

## 2023-01-27 ENCOUNTER — APPOINTMENT (OUTPATIENT)
Dept: UROLOGY | Facility: CLINIC | Age: 88
End: 2023-01-27
Payer: MEDICAID

## 2023-01-27 VITALS — OXYGEN SATURATION: 96 % | DIASTOLIC BLOOD PRESSURE: 78 MMHG | HEART RATE: 83 BPM | SYSTOLIC BLOOD PRESSURE: 124 MMHG

## 2023-01-27 LAB — PSA SERPL-MCNC: 5.43 NG/ML

## 2023-01-27 PROCEDURE — 99213 OFFICE O/P EST LOW 20 MIN: CPT | Mod: 25

## 2023-01-27 PROCEDURE — 52000 CYSTOURETHROSCOPY: CPT

## 2023-01-27 PROCEDURE — 96402 CHEMO HORMON ANTINEOPL SQ/IM: CPT

## 2023-01-27 NOTE — HISTORY OF PRESENT ILLNESS
[FreeTextEntry1] : He is an 88 year-old man who is seen today with his wife for prostate cancer, rising PSA level, gross hematuria and urinary retention.  He is undergoing cystoscopy today for gross hematuria.  Urine in the Silva catheter is clear.  His daughter is also on the phone.  He is on Eligard and bicalutamide.  PSA level increased to 5.4.  Visiting nurse is changing his catheter on a monthly basis.  PSA was 1.5 in July 2022.\par \par Previous notes: Recently after catheter change, he developed gross hematuria and he was admitted to [facility] and the [facility]'s.  He required change of catheter and CBI.  He did not require any blood transfusion or procedures. Labs showed hematocrit 34, creatinine 0.9, and urine culture showed Klebsiella.  CT scan in November 2022 showed stones or bladder wall calcifications, hyperdense material in the bladder most likely hemorrhage.  Cystoscopy in May 2019 showed enlarged prostate and trabeculated bladder. Urine cytology was benign. \par \par He went to [facility] for a second opinion which agreed with our management of prostate cancer with leuprolide and Casodex. In November 2018, he was admitted to [facility] for gross hematuria. He was placed on continuous bladder irrigation and received blood transfusion. \par PSA was 98. Prostate biopsy showed high-volume [place] 8 prostate cancer. Bone scan was normal. 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 In June 2018, urinalysis was negative and PSA level was 98.7. He had a stroke about 10 years ago. Lumbar MRI in the past had shown thickened bladder wall and renal cysts.

## 2023-01-27 NOTE — ASSESSMENT
[FreeTextEntry1] : Cystoscopy today did not show any obvious bladder mucosal lesions.  Silva catheter was replaced.  PSA level increased to 5.3.  Eligard 45 mg, 6-month injection was given on the right side.  Side effects reviewed.  Vitamin D and calcium are recommended.  Stop bicalutamide.  He should see oncology for further management rising PSA level.  He will follow-up in 6 months for the next Eligard injection.\par \par Alireza Vinson MD, FACS\par [facility]\par  of Urology\par \par [address], [address]\[place] [address]\par \par [address], [address]\[place] [address]\par \par Tel: [phone]\par Fax: [phone]

## 2023-01-27 NOTE — PHYSICAL EXAM
[General Appearance - Well Developed] : well developed [General Appearance - Well Nourished] : well nourished [Normal Appearance] : normal appearance [Well Groomed] : well groomed [General Appearance - In No Acute Distress] : no acute distress [Abdomen Soft] : soft [Abdomen Tenderness] : non-tender [Costovertebral Angle Tenderness] : no ~M costovertebral angle tenderness [Urethral Meatus] : meatus normal [Penis Abnormality] : normal circumcised penis [Urinary Bladder Findings] : the bladder was normal on palpation [Scrotum] : the scrotum was normal [Testes Tenderness] : no tenderness of the testes [Testes Mass (___cm)] : there were no testicular masses [FreeTextEntry1] : Silva with yellow urine draining [] : no respiratory distress [Respiration, Rhythm And Depth] : normal respiratory rhythm and effort [Exaggerated Use Of Accessory Muscles For Inspiration] : no accessory muscle use

## 2023-01-27 NOTE — LETTER BODY
[Dear  ___] : Dear  [unfilled], [Consult Letter:] : I had the pleasure of evaluating your patient, [unfilled]. [Consult Closing:] : Thank you very much for allowing me to participate in the care of this patient.  If you have any questions, please do not hesitate to contact me. [FreeTextEntry1] : Address: [address] [place] [address]\par \par Phone: [phone]

## 2023-02-01 ENCOUNTER — OUTPATIENT (OUTPATIENT)
Dept: OUTPATIENT SERVICES | Facility: HOSPITAL | Age: 88
LOS: 1 days | Discharge: HOME | End: 2023-02-01

## 2023-02-01 DIAGNOSIS — Z98.890 OTHER SPECIFIED POSTPROCEDURAL STATES: Chronic | ICD-10-CM

## 2023-02-01 DIAGNOSIS — C61 MALIGNANT NEOPLASM OF PROSTATE: ICD-10-CM

## 2023-02-06 ENCOUNTER — RESULT REVIEW (OUTPATIENT)
Age: 88
End: 2023-02-06

## 2023-02-06 ENCOUNTER — APPOINTMENT (OUTPATIENT)
Dept: HEMATOLOGY ONCOLOGY | Facility: CLINIC | Age: 88
End: 2023-02-06
Payer: MEDICAID

## 2023-02-06 VITALS
HEART RATE: 64 BPM | HEIGHT: 62 IN | TEMPERATURE: 96.3 F | DIASTOLIC BLOOD PRESSURE: 75 MMHG | OXYGEN SATURATION: 96 % | BODY MASS INDEX: 24.29 KG/M2 | SYSTOLIC BLOOD PRESSURE: 140 MMHG | WEIGHT: 132 LBS

## 2023-02-06 LAB
BASOPHILS # BLD AUTO: 0.1 K/UL — SIGNIFICANT CHANGE UP (ref 0–0.2)
BASOPHILS NFR BLD AUTO: 1.2 % — SIGNIFICANT CHANGE UP (ref 0–2)
EOSINOPHIL # BLD AUTO: 0.2 K/UL — SIGNIFICANT CHANGE UP (ref 0–0.5)
EOSINOPHIL NFR BLD AUTO: 3.2 % — SIGNIFICANT CHANGE UP (ref 0–6)
HCT VFR BLD CALC: 37.6 % — LOW (ref 39–50)
HGB BLD-MCNC: 12.8 G/DL — LOW (ref 13–17)
LYMPHOCYTES # BLD AUTO: 1.8 K/UL — SIGNIFICANT CHANGE UP (ref 1–3.3)
LYMPHOCYTES # BLD AUTO: 26 % — SIGNIFICANT CHANGE UP (ref 13–44)
MCHC RBC-ENTMCNC: 27.8 PG — SIGNIFICANT CHANGE UP (ref 27–34)
MCHC RBC-ENTMCNC: 34.1 G/DL — SIGNIFICANT CHANGE UP (ref 32–36)
MCV RBC AUTO: 81.6 FL — SIGNIFICANT CHANGE UP (ref 80–100)
MONOCYTES # BLD AUTO: 0.6 K/UL — SIGNIFICANT CHANGE UP (ref 0–0.9)
MONOCYTES NFR BLD AUTO: 8.8 % — SIGNIFICANT CHANGE UP (ref 2–14)
NEUTROPHILS # BLD AUTO: 4.2 K/UL — SIGNIFICANT CHANGE UP (ref 1.8–7.4)
NEUTROPHILS NFR BLD AUTO: 60.8 % — SIGNIFICANT CHANGE UP (ref 43–77)
PLATELET # BLD AUTO: 143 K/UL — LOW (ref 150–400)
RBC # BLD: 4.61 M/UL — SIGNIFICANT CHANGE UP (ref 4.2–5.8)
RBC # FLD: 14 % — SIGNIFICANT CHANGE UP (ref 10.3–14.5)
WBC # BLD: 6.8 K/UL — SIGNIFICANT CHANGE UP (ref 3.8–10.5)
WBC # FLD AUTO: 6.8 K/UL — SIGNIFICANT CHANGE UP (ref 3.8–10.5)

## 2023-02-06 PROCEDURE — 99205 OFFICE O/P NEW HI 60 MIN: CPT

## 2023-02-07 LAB
ALBUMIN SERPL ELPH-MCNC: 4.2 G/DL
ALP BLD-CCNC: 126 U/L
ALT SERPL-CCNC: 11 U/L
ANION GAP SERPL CALC-SCNC: 13 MMOL/L
AST SERPL-CCNC: 19 U/L
BILIRUB SERPL-MCNC: 0.6 MG/DL
BUN SERPL-MCNC: 11 MG/DL
CALCIUM SERPL-MCNC: 9.6 MG/DL
CHLORIDE SERPL-SCNC: 100 MMOL/L
CO2 SERPL-SCNC: 25 MMOL/L
CREAT SERPL-MCNC: 1.04 MG/DL
EGFR: 70 ML/MIN/1.73M2
GLUCOSE SERPL-MCNC: 92 MG/DL
POTASSIUM SERPL-SCNC: 4.8 MMOL/L
PROT SERPL-MCNC: 7 G/DL
SODIUM SERPL-SCNC: 139 MMOL/L

## 2023-02-09 NOTE — RESULTS/DATA
[FreeTextEntry1] : 8/13/18 Pathology\par  Final Diagnosis\par           1. Prostate, right base, biopsy\par -Benign prostate tissue.\par \par           2. Prostate, right mid, biopsy\par -Adenocarcinoma of the prostate, Prognostic Grade Group 4\par           (Mikael score 4+4=8) involving 50% (4 mm in length) of 1 of 2\par           core(s).\par -Adenocarcinoma of the prostate, Prognostic Grade Group 1\par           (Mikael score 3+3=6) involving 10% (2 mm in length) of 1 of 2\par           core(s).\par \par           3. Prostate, right apex, biopsy\par -Adenocarcinoma of the prostate, Prognostic Grade Group 4\par           (Mikael score 4+4=8) involving 90% (12 mm in length) of 2 of 2\par           core(s).\par - Perineural invasion seen\par \par           4. Prostate, left base, biopsy\par -Adenocarcinoma of the prostate, Prognostic Grade Group 1\par           ([place] score 3+3=6) involving 30% and 100% (12 mm and 5 mm in\par           length) of 2 of 3 core(s).\par \par           5. Prostate, left mid, biopsy\par -Adenocarcinoma of the prostate, Prognostic Grade Group 1\par           ([place] score 3+3=6) involving 15 % (3 mm in length) of 1 of 2\par           core(s).\par \par           6. Prostate, left apex, biopsy\par -Adenocarcinoma of the prostate, Prognostic Grade Group 4\par           ([place] score 4+4=8) involving 10% and 30% (2 mm and 10 mm in\par           length) of 1 of 2 core(s).\par

## 2023-02-09 NOTE — ADDENDUM
[FreeTextEntry1] : Documented by Nisreen England acting as scribe for Dr. Albrecht on 02/06/2023 \par \par All Medical record entries made by the Scribe were at my, Dr. Albrecht's, direction and personally dictated by me on 02/06/2023 . I have reviewed the chart and agree that the record accurately reflects my personal performance of the history, physical exam, assessment and plan. I have also personally directed, reviewed, and agreed with the discharge instructions.\par

## 2023-02-09 NOTE — HISTORY OF PRESENT ILLNESS
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 Pathology report showed high-volume [place] 8 prostate cancer. Bone scan was normal 2018. 8/2018 CT scan showed iliac chain adenopathy up to 2 cm.\par Patient started on Lupron q 4 months on 8/31/2018 and Casodex \par Patient h/o hospitalization 11/2018 for gross hematuria requiring blood transfusions. he has had frequent hospitalizations for gross hematuria/recurrent UTIs. Due the gross hematuria, anticoagulation has been held due to symptoms and h/o frequent falls. \par Patient has previously seen Dr. Karen Hemphill on 12/18/2019 who recommended to continue current regimen, did not feel Xtandi/Zytiga/chemotherapy was needed at that time \par Cystoscopy in May 2019 showed enlarged prostate and trabeculated bladder. \par Patient currently receives Eligard, 6 month dose was given on 1/27/23. 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 \par Patient tolerating lupron well\par Patient currently on Lexapro for depression\par

## 2023-02-09 NOTE — ASSESSMENT
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 \par \par Pathology report showed high-volume [place] 8 prostate cancer. Bone scan was normal 2018. 8/2018 CT scan showed iliac chain adenopathy up to 2 cm.\par Patient h/o hospitalization 11/2018 for gross hematuria requiring blood transfusions. He has had frequent hospitalizations for gross hematuria/recurrent UTIs. Due the gross hematuria, anticoagulation has been held due to symptoms and h/o frequent falls. \par Patient has previously seen Dr. Karen Hemphill on 12/18/2019 who recommended to continue current regimen, did not feel Xtandi/Zytiga/chemotherapy was needed at that time \par Cystoscopy in May 2019 showed enlarged prostate and trabeculated bladder. \par \par \par PSA:\par 06/2018 98.7\par 3/15/2019 0.26\par 8/28/2020 0.05\par 12/29/2021 0.36\par 7/22/22 1.61\par 1/8/23: PSA 5.43\par \par \par Plan: \par -Patient started on q 4 month Lupron and Casodex in 8/31/2018. \par -Patient advised to stop Casodex on 1/27/23 per URO. \par -Patient was given q 6 month Eligard on 1/27/23\par -Will consider addition of apalutamide to regimen depending on results of PSMA PET scan. \par -Schedule PSMA PET\par -Labs today, CBC, CMP, PSA, testosterone\par -F/u in 3-4 weeks\par

## 2023-02-27 ENCOUNTER — OUTPATIENT (OUTPATIENT)
Dept: OUTPATIENT SERVICES | Facility: HOSPITAL | Age: 88
LOS: 1 days | End: 2023-02-27

## 2023-02-27 ENCOUNTER — APPOINTMENT (OUTPATIENT)
Dept: NUCLEAR MEDICINE | Facility: CLINIC | Age: 88
End: 2023-02-27
Payer: MEDICAID

## 2023-02-27 DIAGNOSIS — C61 MALIGNANT NEOPLASM OF PROSTATE: ICD-10-CM

## 2023-02-27 DIAGNOSIS — Z98.890 OTHER SPECIFIED POSTPROCEDURAL STATES: Chronic | ICD-10-CM

## 2023-02-27 PROCEDURE — 78816 PET IMAGE W/CT FULL BODY: CPT | Mod: 26

## 2023-03-01 ENCOUNTER — APPOINTMENT (OUTPATIENT)
Dept: CARDIOLOGY | Facility: CLINIC | Age: 88
End: 2023-03-01
Payer: MEDICAID

## 2023-03-01 VITALS
OXYGEN SATURATION: 98 % | WEIGHT: 135 LBS | RESPIRATION RATE: 16 BRPM | DIASTOLIC BLOOD PRESSURE: 62 MMHG | BODY MASS INDEX: 24.84 KG/M2 | HEIGHT: 62 IN | SYSTOLIC BLOOD PRESSURE: 118 MMHG | HEART RATE: 68 BPM

## 2023-03-01 DIAGNOSIS — M47.816 SPONDYLOSIS W/OUT MYELOPATHY OR RADICULOPATHY, LUMBAR REGION: ICD-10-CM

## 2023-03-01 PROCEDURE — 93000 ELECTROCARDIOGRAM COMPLETE: CPT

## 2023-03-01 PROCEDURE — 99214 OFFICE O/P EST MOD 30 MIN: CPT | Mod: 25

## 2023-03-01 NOTE — REASON FOR VISIT
[FreeTextEntry1] : Patient presents here for  cardiac re- evaluation re management of PAF and MR, SOB/ HFpEF \par \par He has some mild baseline shortness of breath but this has been less concerning to him more recently.\par \par Concerned regarding some gastroesophageal reflux type symptoms that occur predictably after he takes his morning and his evening meds.\par \par In [facility] 6/12/2021 to 6/17/2021 with acute gangrenous cholecystitis.- emergency laparoscopic cholecystectomy.\par Cardiology consultation performed may have been with another group.  We were not informed.\par \par His cardiac history is that of:\par Paroxysmal atrial fibrillation \par Severe mitral valve insufficiency. \par History of a CVA 2008. \par History of hypertension. \par History of hyperlipidemia.\par \par Patient’s major medical problem at this point in time is advanced prostate cancer for which he is now being treated with hormonal therapy and has an indwelling Silva.  He has had several bouts of hematuria and recent UTI's\par \par We also elected to begin clopidogrel for the atrial fibrillation.  Issues of falling and hematuria prevented conventional anticoagulation and the patient peptic ulcer disease preempted use of aspirin. 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  Frequent falls created an additional anticoagulation concern.\par \par He has baseline exertional fatigability but moves very slowly with the use of a walker.  \par \par There is no chest pain, palpitations, PND edema.  \par \par The falls all seem to be due to balance issues, not necessarily due to any loss of vision or consciousness.  \par He has been tolerating 25 mg of metoprolol daily without any significant dizziness. \par \par

## 2023-03-01 NOTE — ASSESSMENT
[FreeTextEntry1] :  ECG shows normal sinus rhythm at 69  First degree AV block. 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  \par \par Laboratory data \par -----9/16/20-----1/4/2021--7/22/22\par Chol---255----------159------160\par HDL---- 47-----------48-------41\par LDL----186---------- 94------101\par Hemoglobin 11.2\par \par Echocardiogram 10/13/2022:\par Borderline increase in wall thickness\par LV ejection action 65 to 70%\par Severe left atrial dilatation\par Moderate tricuspid regurgitation\par Severe mitral valve prolapse and regurgitation\par Severe pulmonary hypertension PA systolic 97 mmHg\par \par Echocardiogram 6/14/2021 [facility]\par LVH with normal systolic function ejection fraction 60 to 65%\par Severe pulmonary hypertension 92 mmHg\par Severe left atrial enlargement\par Severe mitral regurgitation\par Moderately severe tricuspid regurgitation\par \par Echocardiogram 12/10/2020:\par Overall normal left ventricular systolic function.\par Partially flail posterior leaflet mitral valve with severe eccentric mitral valve regurgitation.\par Moderate tricuspid regurgitation\par Severe pulmonary artery hypertension\par \par Event recorder 8/27/2022 9/9/2020\par Sinus rhythm with runs of paroxysmal atrial fibrillation accelerated rates sometimes as high as 150 to 160 bpm noted.  Occasional PVCs are noted.\par \par Impression:\par 1.  RBBB, first-degree and LAFB,  Still no evidence of symptomatic conduction disease\par \par 2.  Anticoagulation has been stopped again because of the risks caused by recurrent hematuria and frequent falls.      Discussed with the family that this is a risk-benefit analysis and at this point in time, it seems like the appropriate     decision, albeit with the risk of embolic CVA because of the a-fib. \par     Family understands that we cannot exclude the risk of an embolic event because of a-fib.\par     Aspirin has not been used because of  recent problem with H. pylori and peptic ulcer disease.  Plavix has been      tolerated so far\par \par 3.  There is a history of severe mitral regurgitation clearly heard on examination, and this is probably the underlying substrate that contributes to the atrial fibrillation. \par \par 4.  The current echocardiogram continues to demonstrate 3-4+ eccentric mitral regurgitation with severe (nearly systemic) pulmonary hypertension and LVH and normal left ventricular systolic function.  Do not see any major changes on this echocardiogram but nonetheless the patient appears to be asymptomatic of heart failure\par \par Plan:\par 1.  Encourage the patient to continue furosemide 20 mg p.o. daily and  spironolactone 25 mg QOD\par      Because of concerns re: relatively low blood pressure will continue metoprolol  25 mg a day.  No increase\par \par 2.  We will continue rosuvastatin 40 mg p.o. daily given the marked elevation in his  cholesterol/LDL\par \par 3.  Obtain follow-up blood work in 3 months\par \par 4.  Meclizine 25 mg p.o. as needed for the vertigo.\par \par 5.  Continue Plavix 75 mg p.o. daily to least have some form of anticoagulation in the face of PAF and history of           CVA.\par 6.  Blood work and clinical follow-up in 3 months if there are no clinical changes\par \par 7.  Echocardiogram on an annual basis\par \par Contact me if there are any new symptoms of shortness of breath, orthopnea or edema.

## 2023-03-07 LAB
TESTOST FREE SERPL-MCNC: 0.2 PG/ML
TESTOST SERPL-MCNC: <2.5 NG/DL

## 2023-03-08 ENCOUNTER — APPOINTMENT (OUTPATIENT)
Dept: HEMATOLOGY ONCOLOGY | Facility: CLINIC | Age: 88
End: 2023-03-08
Payer: MEDICAID

## 2023-03-08 ENCOUNTER — RESULT REVIEW (OUTPATIENT)
Age: 88
End: 2023-03-08

## 2023-03-08 VITALS
WEIGHT: 135.01 LBS | SYSTOLIC BLOOD PRESSURE: 142 MMHG | HEIGHT: 62 IN | OXYGEN SATURATION: 96 % | DIASTOLIC BLOOD PRESSURE: 81 MMHG | BODY MASS INDEX: 24.84 KG/M2 | HEART RATE: 74 BPM

## 2023-03-08 LAB
BASOPHILS # BLD AUTO: 0.1 K/UL — SIGNIFICANT CHANGE UP (ref 0–0.2)
BASOPHILS NFR BLD AUTO: 0.7 % — SIGNIFICANT CHANGE UP (ref 0–2)
EOSINOPHIL # BLD AUTO: 0.2 K/UL — SIGNIFICANT CHANGE UP (ref 0–0.5)
EOSINOPHIL NFR BLD AUTO: 2 % — SIGNIFICANT CHANGE UP (ref 0–6)
HCT VFR BLD CALC: 38.7 % — LOW (ref 39–50)
HGB BLD-MCNC: 12.7 G/DL — LOW (ref 13–17)
LYMPHOCYTES # BLD AUTO: 1.8 K/UL — SIGNIFICANT CHANGE UP (ref 1–3.3)
LYMPHOCYTES # BLD AUTO: 23 % — SIGNIFICANT CHANGE UP (ref 13–44)
MCHC RBC-ENTMCNC: 27 PG — SIGNIFICANT CHANGE UP (ref 27–34)
MCHC RBC-ENTMCNC: 32.9 G/DL — SIGNIFICANT CHANGE UP (ref 32–36)
MCV RBC AUTO: 82 FL — SIGNIFICANT CHANGE UP (ref 80–100)
MONOCYTES # BLD AUTO: 0.7 K/UL — SIGNIFICANT CHANGE UP (ref 0–0.9)
MONOCYTES NFR BLD AUTO: 8.6 % — SIGNIFICANT CHANGE UP (ref 2–14)
NEUTROPHILS # BLD AUTO: 5.1 K/UL — SIGNIFICANT CHANGE UP (ref 1.8–7.4)
NEUTROPHILS NFR BLD AUTO: 65.7 % — SIGNIFICANT CHANGE UP (ref 43–77)
PLATELET # BLD AUTO: 140 K/UL — LOW (ref 150–400)
PSA SERPL-MCNC: 5.51 NG/ML
RBC # BLD: 4.72 M/UL — SIGNIFICANT CHANGE UP (ref 4.2–5.8)
RBC # FLD: 15.2 % — HIGH (ref 10.3–14.5)
WBC # BLD: 7.7 K/UL — SIGNIFICANT CHANGE UP (ref 3.8–10.5)
WBC # FLD AUTO: 7.7 K/UL — SIGNIFICANT CHANGE UP (ref 3.8–10.5)

## 2023-03-08 PROCEDURE — 99215 OFFICE O/P EST HI 40 MIN: CPT

## 2023-03-08 NOTE — HISTORY OF PRESENT ILLNESS
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 Pathology report showed high-volume [place] 8 prostate cancer. Bone scan was normal 2018. 8/2018 CT scan showed iliac chain adenopathy up to 2 cm.\par Patient started on Lupron q 4 months on 8/31/2018 and Casodex \par Patient h/o hospitalization 11/2018 for gross hematuria requiring blood transfusions. he has had frequent hospitalizations for gross hematuria/recurrent UTIs. Due the gross hematuria, anticoagulation has been held due to symptoms and h/o frequent falls. \par Patient has previously seen Dr. Karen Hemphill on 12/18/2019 who recommended to continue current regimen, did not feel Xtandi/Zytiga/chemotherapy was needed at that time \par Cystoscopy in May 2019 showed enlarged prostate and trabeculated bladder. \par Patient currently receives Eligard, 6 month dose was given on 1/27/23. 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 PSMA-positive lesion in right posterior base of prostate gland, extending into right seminal vesicle, corresponds to patient's known prostate carcinoma. The intensity of radiotracer activity indicates high PSMA expression.\par 2. Several subcentimeter PSMA positive lymph nodes in perirectal, presacral, and right external iliac regions are compatible with metastatic disease.\par \par PSA increasing 5.5 in Feb 2023\par Indwelling Silva catheter in place for 3-4 years. \par Patient tolerating lupron well\par Patient currently on Lexapro for depression\par Discussed enzalutamide with patient/ wife and daughter \par

## 2023-03-08 NOTE — RESULTS/DATA
[FreeTextEntry1] : 8/13/18 Pathology\par  Final Diagnosis\par           1. Prostate, right base, biopsy\par -Benign prostate tissue.\par \par           2. Prostate, right mid, biopsy\par -Adenocarcinoma of the prostate, Prognostic Grade Group 4\par           (Mikael score 4+4=8) involving 50% (4 mm in length) of 1 of 2\par           core(s).\par -Adenocarcinoma of the prostate, Prognostic Grade Group 1\par           (Mikael score 3+3=6) involving 10% (2 mm in length) of 1 of 2\par           core(s).\par \par           3. Prostate, right apex, biopsy\par -Adenocarcinoma of the prostate, Prognostic Grade Group 4\par           (Mikael score 4+4=8) involving 90% (12 mm in length) of 2 of 2\par           core(s).\par - Perineural invasion seen\par \par           4. Prostate, left base, biopsy\par -Adenocarcinoma of the prostate, Prognostic Grade Group 1\par           ([place] score 3+3=6) involving 30% and 100% (12 mm and 5 mm in\par           length) of 2 of 3 core(s).\par \par           5. Prostate, left mid, biopsy\par -Adenocarcinoma of the prostate, Prognostic Grade Group 1\par           ([place] score 3+3=6) involving 15 % (3 mm in length) of 1 of 2\par           core(s).\par \par           6. Prostate, left apex, biopsy\par -Adenocarcinoma of the prostate, Prognostic Grade Group 4\par           ([place] score 4+4=8) involving 10% and 30% (2 mm and 10 mm in\par           length) of 1 of 2 core(s).\par

## 2023-03-08 NOTE — ASSESSMENT
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 \par \par Pathology report showed high-volume [place] 8 prostate cancer. Bone scan was normal 2018. 8/2018 CT scan showed iliac chain adenopathy up to 2 cm.\par Patient h/o hospitalization 11/2018 for gross hematuria requiring blood transfusions. He has had frequent hospitalizations for gross hematuria/recurrent UTIs. Due the gross hematuria, anticoagulation has been held due to symptoms and h/o frequent falls. \par Patient has previously seen Dr. Karen Hemphill on 12/18/2019 who recommended to continue current regimen, did not feel Xtandi/Zytiga/chemotherapy was needed at that time \par Cystoscopy in May 2019 showed enlarged prostate and trabeculated bladder. \par \par \par PSA:\par 06/2018 98.7\par 3/15/2019 0.26\par 8/28/2020 0.05\par 12/29/2021 0.36\par 7/22/22 1.61\par 1/8/23: PSA 5.43\par \par PSMA Pet scan 2/24/23: \par 1. PSMA-positive lesion in right posterior base of prostate gland, extending into right seminal vesicle, corresponds to patient's known prostate carcinoma. The intensity of radiotracer activity indicates high PSMA expression.\par 2. Several subcentimeter PSMA positive lymph nodes in perirectal, presacral, and right external iliac regions are compatible with metastatic disease.\par \par \par Plan: \par -Patient started on q 4 month Lupron and Casodex in 8/31/2018. \par -Patient advised to stop Casodex on 1/27/23 per URO. \par -Patient was given q 6 month Eligard on 1/27/23\par - Discussed enzalutamide with patient/ wife and daughter AE of medication discussed \par - Prescription sent to [facility] \par -F/u in 4-5 weeks with marisol\par

## 2023-03-13 LAB
ALBUMIN SERPL ELPH-MCNC: 4.2 G/DL
ALP BLD-CCNC: 133 U/L
ALT SERPL-CCNC: 11 U/L
ANION GAP SERPL CALC-SCNC: 12 MMOL/L
AST SERPL-CCNC: 19 U/L
BILIRUB SERPL-MCNC: 0.6 MG/DL
BUN SERPL-MCNC: 14 MG/DL
CALCIUM SERPL-MCNC: 9.5 MG/DL
CHLORIDE SERPL-SCNC: 99 MMOL/L
CO2 SERPL-SCNC: 25 MMOL/L
CREAT SERPL-MCNC: 0.94 MG/DL
EGFR: 79 ML/MIN/1.73M2
GLUCOSE SERPL-MCNC: 86 MG/DL
POTASSIUM SERPL-SCNC: 4.6 MMOL/L
PROT SERPL-MCNC: 7.2 G/DL
PSA SERPL-MCNC: 5.34 NG/ML
SODIUM SERPL-SCNC: 136 MMOL/L

## 2023-04-14 NOTE — RESULTS/DATA
[FreeTextEntry1] : 8/13/18 Pathology\par  Final Diagnosis\par           1. Prostate, right base, biopsy\par -Benign prostate tissue.\par \par           2. Prostate, right mid, biopsy\par -Adenocarcinoma of the prostate, Prognostic Grade Group 4\par           (Mikael score 4+4=8) involving 50% (4 mm in length) of 1 of 2\par           core(s).\par -Adenocarcinoma of the prostate, Prognostic Grade Group 1\par           (Mikael score 3+3=6) involving 10% (2 mm in length) of 1 of 2\par           core(s).\par \par           3. Prostate, right apex, biopsy\par -Adenocarcinoma of the prostate, Prognostic Grade Group 4\par           (Mikael score 4+4=8) involving 90% (12 mm in length) of 2 of 2\par           core(s).\par - Perineural invasion seen\par \par           4. Prostate, left base, biopsy\par -Adenocarcinoma of the prostate, Prognostic Grade Group 1\par           ([place] score 3+3=6) involving 30% and 100% (12 mm and 5 mm in\par           length) of 2 of 3 core(s).\par \par           5. Prostate, left mid, biopsy\par -Adenocarcinoma of the prostate, Prognostic Grade Group 1\par           ([place] score 3+3=6) involving 15 % (3 mm in length) of 1 of 2\par           core(s).\par \par           6. Prostate, left apex, biopsy\par -Adenocarcinoma of the prostate, Prognostic Grade Group 4\par           ([place] score 4+4=8) involving 10% and 30% (2 mm and 10 mm in\par           length) of 1 of 2 core(s).\par

## 2023-04-14 NOTE — ASSESSMENT
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 \par \par Pathology report showed high-volume [place] 8 prostate cancer. Bone scan was normal 2018. 8/2018 CT scan showed iliac chain adenopathy up to 2 cm.\par Patient h/o hospitalization 11/2018 for gross hematuria requiring blood transfusions. He has had frequent hospitalizations for gross hematuria/recurrent UTIs. Due the gross hematuria, anticoagulation has been held due to symptoms and h/o frequent falls. \par Patient has previously seen Dr. Karen Hemphill on 12/18/2019 who recommended to continue current regimen, did not feel Xtandi/Zytiga/chemotherapy was needed at that time \par Cystoscopy in May 2019 showed enlarged prostate and trabeculated bladder. \par \par \par PSA:\par 06/2018 98.7\par 3/15/2019 0.26\par 8/28/2020 0.05\par 12/29/2021 0.36\par 7/22/22 1.61\par 1/8/23: PSA 5.43\par \par PSMA Pet scan 2/24/23: \par 1. PSMA-positive lesion in right posterior base of prostate gland, extending into right seminal vesicle, corresponds to patient's known prostate carcinoma. The intensity of radiotracer activity indicates high PSMA expression.\par 2. Several subcentimeter PSMA positive lymph nodes in perirectal, presacral, and right external iliac regions are compatible with metastatic disease.\par \par \par Plan: \par -Patient started on q 4 month Lupron and Casodex in 8/31/2018. \par -Patient advised to stop Casodex on 1/27/23 per URO. \par -Patient was given q 6 month Eligard on 1/27/23\par - Discussed Enzalutamide with patient/ wife and daughter AE of medication discussed \par - Patient started Xtandi on 3/14/23\par - Repeat CMP/PSA today \par - F/u in 6 weeks with PA\par - F/U in 12 weeks with MD\par

## 2023-04-14 NOTE — HISTORY OF PRESENT ILLNESS
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 Pathology report showed high-volume [place] 8 prostate cancer. Bone scan was normal 2018. 8/2018 CT scan showed iliac chain adenopathy up to 2 cm.\par Patient started on Lupron q 4 months on 8/31/2018 and Casodex \par Patient h/o hospitalization 11/2018 for gross hematuria requiring blood transfusions. he has had frequent hospitalizations for gross hematuria/recurrent UTIs. Due the gross hematuria, anticoagulation has been held due to symptoms and h/o frequent falls. \par Patient has previously seen Dr. Karen Hemphill on 12/18/2019 who recommended to continue current regimen, did not feel Xtandi/Zytiga/chemotherapy was needed at that time \par Cystoscopy in May 2019 showed enlarged prostate and trabeculated bladder. \par Patient currently receives Eligard, 6 month dose was given on 1/27/23. 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 PSMA-positive lesion in right posterior base of prostate gland, extending into right seminal vesicle, corresponds to patient's known prostate carcinoma. The intensity of radiotracer activity indicates high PSMA expression.\par 2. Several subcentimeter PSMA positive lymph nodes in perirectal, presacral, and right external iliac regions are compatible with metastatic disease.\par \par PSA increasing 5.5 in Feb 2023\par Indwelling Silva catheter in place for 3-4 years. \par Patient tolerating lupron well. Patient started Xtandi on 3/14/23\par Reports distance vision is blurry, he feels it may be worsening. He follows eye doctor with macular degeneration \par Admits hot flashes for the past 3 years, stable \par Patient otherwise is clinically stable \par Patient currently on Lexapro for depression\par \par

## 2023-06-02 NOTE — ASSESSMENT
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 \par \par Pathology report showed high-volume [place] 8 prostate cancer. Bone scan was normal 2018. 8/2018 CT scan showed iliac chain adenopathy up to 2 cm.\par Patient h/o hospitalization 11/2018 for gross hematuria requiring blood transfusions. He has had frequent hospitalizations for gross hematuria/recurrent UTIs. Due the gross hematuria, anticoagulation has been held due to symptoms and h/o frequent falls. \par Patient has previously seen Dr. Karen Hemphill on 12/18/2019 who recommended to continue current regimen, did not feel Xtandi/Zytiga/chemotherapy was needed at that time \par Cystoscopy in May 2019 showed enlarged prostate and trabeculated bladder. \par \par \par PSA:\par 06/2018 98.7\par 3/15/2019 0.26\par 8/28/2020 0.05\par 12/29/2021 0.36\par 7/22/22 1.61\par 1/8/23: PSA 5.43\par 4/14/23: PSA 0.46\par \par PSMA Pet scan 2/24/23: \par 1. PSMA-positive lesion in right posterior base of prostate gland, extending into right seminal vesicle, corresponds to patient's known prostate carcinoma. The intensity of radiotracer activity indicates high PSMA expression.\par 2. Several subcentimeter PSMA positive lymph nodes in perirectal, presacral, and right external iliac regions are compatible with metastatic disease.\par \par \par Plan: \par -Patient started on q 4 month Lupron and Casodex in 8/31/2018. \par -Patient advised to stop Casodex on 1/27/23 per URO. \par -Patient was given q 6 month Eligard on 1/27/23 Eligard due in July 2023\par - Discussed Enzalutamide with patient/ wife and daughter AE of medication discussed \par - Patient started Xtandi on 3/14/23. Dose reduced to 3 tabs daily on 5/25/23. Advised patient to stop Xtandi given appetite loss and confusion. Patient not tolerating medication \par - Repeat CMP/PSA today \par - F/u in 6 weeks with MD\par

## 2023-06-02 NOTE — RESULTS/DATA
[FreeTextEntry1] : 8/13/18 Pathology\par  Final Diagnosis\par           1. Prostate, right base, biopsy\par -Benign prostate tissue.\par \par           2. Prostate, right mid, biopsy\par -Adenocarcinoma of the prostate, Prognostic Grade Group 4\par           (Mikael score 4+4=8) involving 50% (4 mm in length) of 1 of 2\par           core(s).\par -Adenocarcinoma of the prostate, Prognostic Grade Group 1\par           (Mikael score 3+3=6) involving 10% (2 mm in length) of 1 of 2\par           core(s).\par \par           3. Prostate, right apex, biopsy\par -Adenocarcinoma of the prostate, Prognostic Grade Group 4\par           (Mikael score 4+4=8) involving 90% (12 mm in length) of 2 of 2\par           core(s).\par - Perineural invasion seen\par \par           4. Prostate, left base, biopsy\par -Adenocarcinoma of the prostate, Prognostic Grade Group 1\par           (Menan score 3+3=6) involving 30% and 100% (12 mm and 5 mm in\par           length) of 2 of 3 core(s).\par \par           5. Prostate, left mid, biopsy\par -Adenocarcinoma of the prostate, Prognostic Grade Group 1\par           (Menan score 3+3=6) involving 15 % (3 mm in length) of 1 of 2\par           core(s).\par \par           6. Prostate, left apex, biopsy\par -Adenocarcinoma of the prostate, Prognostic Grade Group 4\par           (Menan score 4+4=8) involving 10% and 30% (2 mm and 10 mm in\par           length) of 1 of 2 core(s).\par

## 2023-06-02 NOTE — HISTORY OF PRESENT ILLNESS
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 Pathology report showed high-volume [place] 8 prostate cancer. Bone scan was normal 2018. 8/2018 CT scan showed iliac chain adenopathy up to 2 cm.\par Patient started on Lupron q 4 months on 8/31/2018 and Casodex \par Patient h/o hospitalization 11/2018 for gross hematuria requiring blood transfusions. he has had frequent hospitalizations for gross hematuria/recurrent UTIs. Due the gross hematuria, anticoagulation has been held due to symptoms and h/o frequent falls. \par Patient has previously seen Dr. Karen Hemphill on 12/18/2019 who recommended to continue current regimen, did not feel Xtandi/Zytiga/chemotherapy was needed at that time \par Cystoscopy in May 2019 showed enlarged prostate and trabeculated bladder. \par Patient currently receives Eligard, 6 month dose was given on 1/27/23. 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 PSMA-positive lesion in right posterior base of prostate gland, extending into right seminal vesicle, corresponds to patient's known prostate carcinoma. The intensity of radiotracer activity indicates high PSMA expression.\par 2. Several subcentimeter PSMA positive lymph nodes in perirectal, presacral, and right external iliac regions are compatible with metastatic disease.\par \par PSA down to 0.46 in April 2023\par Patient stopped Xstandi 4 tabs daily as recommend for 3 days and restarted at 3 tabs a day on 5/25/23 \par Wife and daughter report patient with memory troubles, confusion, anxiety, fatigue, loss of appetite ( 2 ensures a day) not drinking enough water \par Patient present to urgent care on 5/25/23 patient found to have a UTI, Sodium 131, potassium 441, chloride 47, creatinine 0.76, alk phos 113, ast 16, alt 10. Completed abx on 6/1/23. \par Indwelling Silva catheter in place for 3-4 years. \par Patient tolerating lupron well. Patient started Xtandi on 3/14/23\par Patient currently on Lexapro for depression\par \par

## 2023-06-02 NOTE — ADDENDUM
[FreeTextEntry1] : Documented by Nisreen England acting as scribe for Dr. Albrecht on 06/02/2023 \par \par All Medical record entries made by the Scribe were at my, Dr. Albrecht's, direction and personally dictated by me on 06/02/2023 . I have reviewed the chart and agree that the record accurately reflects my personal performance of the history, physical exam, assessment and plan. I have also personally directed, reviewed, and agreed with the discharge instructions.\par

## 2023-06-07 PROBLEM — H81.10 BENIGN PAROXYSMAL POSITIONAL VERTIGO: Status: ACTIVE | Noted: 2020-10-15

## 2023-06-07 PROBLEM — I10 ESSENTIAL HYPERTENSION: Status: ACTIVE | Noted: 2020-08-13

## 2023-06-07 NOTE — REASON FOR VISIT
[FreeTextEntry1] : Patient presents here for  cardiac re- evaluation re management of PAF and MR, SOB/ HFpEF \par \par \par Patient’s major medical problem at this point in time is advanced prostate cancer for which he is now being treated with hormonal therapy and has an indwelling Silva.  He has had several bouts of hematuria and recent UTI's\par \par Intercurrent medical issues have regarded treatment of prostate cancer with Xtandi which resulted in memory loss weakness anorexia.  Patient subsequently has been unfocused and had poor mentation.  There has been anorexia and weight loss.  He is now going for fluid infusions at the chemo center.  Uncertain whether this represents normal saline?.\par \par He has some mild baseline shortness of breath but this has been less concerning to him more recently.\par \par In [facility] 6/12/2021 to 6/17/2021 with acute gangrenous cholecystitis.- emergency laparoscopic cholecystectomy.\par Cardiology consultation performed may have been with another group.  We were not informed.\par \par His cardiac history is that of:\par Paroxysmal atrial fibrillation \par Severe mitral valve insufficiency. \par History of a CVA 2008. \par History of hypertension. \par History of hyperlipidemia.\par \par We also elected to begin clopidogrel for the atrial fibrillation.  Issues of falling and hematuria prevented conventional anticoagulation and the patient peptic ulcer disease preempted use of aspirin. \par He did sustain a recent presumed mechanical fall per the family.  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  Frequent falls created an additional anticoagulation concern.\par \par He has baseline exertional fatigability but moves very slowly with the use of a walker.  \par \par There is no chest pain, palpitations, PND edema.  \par \par The falls all seem to be due to balance issues, not necessarily due to any loss of vision or consciousness.  \par He has been tolerating 25 mg of metoprolol daily without any significant dizziness. \par \par

## 2023-06-07 NOTE — PHYSICAL EXAM
[Normal Conjunctiva] : the conjunctiva exhibited no abnormalities [Eyelids - No Xanthelasma] : the eyelids demonstrated no xanthelasmas [Normal Oral Mucosa] : normal oral mucosa [No Oral Pallor] : no oral pallor [No Oral Cyanosis] : no oral cyanosis [Normal Jugular Venous A Waves Present] : normal jugular venous A waves present [Normal Jugular Venous V Waves Present] : normal jugular venous V waves present [No Jugular Venous Schneider A Waves] : no jugular venous schneider A waves [Abdomen Soft] : soft [Abdomen Tenderness] : non-tender [Abdomen Mass (___ Cm)] : no abdominal mass palpated [Skin Turgor] : normal skin turgor [] : no rash [Impaired Insight] : insight and judgment were intact [No Anxiety] : not feeling anxious [FreeTextEntry1] : Blunted affect

## 2023-06-07 NOTE — ASSESSMENT
[FreeTextEntry1] :  ECG normal sinus rhythm at 79 beats minute with occasional APCs, PVCs, left anterior fascicular block, right bundle branch block pattern LVH\par \par Laboratory data \par -----9/16/20-----1/4/2021--7/22/22\par Chol---255----------159------160\par HDL---- 47-----------48-------41\par LDL----186---------- 94------101\par Hemoglobin 11.2\par \par 6/5/2023\par Na+ --131\par Alb - 2.9\par APhos- 153\par \par Echocardiogram 10/13/2022:\par Borderline increase in wall thickness\par LV ejection action 65 to 70%\par Severe left atrial dilatation\par Moderate tricuspid regurgitation\par Severe mitral valve prolapse and regurgitation\par Severe pulmonary hypertension PA systolic 97 mmHg\par \par Echocardiogram 6/14/2021 [facility]\par LVH with normal systolic function ejection fraction 60 to 65%\par Severe pulmonary hypertension 92 mmHg\par Severe left atrial enlargement\par Severe mitral regurgitation\par Moderately severe tricuspid regurgitation\par \par Echocardiogram 12/10/2020:\par Overall normal left ventricular systolic function.\par Partially flail posterior leaflet mitral valve with severe eccentric mitral valve regurgitation.\par Moderate tricuspid regurgitation\par Severe pulmonary artery hypertension\par \par Event recorder 8/27/2022 9/9/2020\par Sinus rhythm with runs of paroxysmal atrial fibrillation accelerated rates sometimes as high as 150 to 160 bpm noted.  Occasional PVCs are noted.\par \par Impression:\par 1.  RBBB, first-degree and LAFB,  Still no evidence of symptomatic conduction disease\par \par 2.  Anticoagulation has been stopped again because of the risks caused by recurrent hematuria and frequent falls.\par     There has been another fall since last visit further cementing this decision.  Discussed with the family that this is     a risk-benefit analysis and at this point in time, it seems like the appropriate decision\par     The change in mentation may be due to a TIA/CVA and the family understands that absent anticoagulation in the     face of valvular heart disease with a large left atrium there is a significant risk of embolic events. . \par     Family understands that we cannot exclude the risk of an embolic event because of a-fib.\par     Aspirin has not been used because of  recent problem with H. pylori and peptic ulcer disease.  Plavix has been      tolerated so far\par \par 3.  There is a history of severe mitral regurgitation clearly heard on examination, and this is probably the underlying substrate that contributes to the atrial fibrillation. \par \par 4.  The current echocardiogram continues to demonstrate 3-4+ eccentric mitral regurgitation with severe (nearly systemic) pulmonary hypertension and LVH and normal left ventricular systolic function.  Do not see any major changes on this echocardiogram but nonetheless the patient appears to be asymptomatic of heart failure\par \par Plan:\par 1.  Spironolactone has been stopped and furosemide will be changed to every other day.\par     A BMP should be obtained in 2 weeks\par \par 2.  Have contacted heme oncology and the infusion center.  Would like to further discuss the use of saline infusions at this point.\par \par 3.  Neuro evaluation was emphasized.  Patient has seen Dr. Rodo Berumen in the past\par \par 4.  Meclizine 25 mg p.o. as needed for the vertigo.\par \par 5.  Continue Plavix 75 mg p.o. daily to least have some form of anticoagulation in the face of PAF and history of           CVA.\par 6.  Blood work and clinical follow-up in 3 months if there are no clinical changes\par \par 7.  Echocardiogram on an annual basis\par \par Contact me if there are any new symptoms of shortness of breath, orthopnea or edema.

## 2023-07-26 NOTE — RESULTS/DATA
[FreeTextEntry1] : 8/13/18 Pathology\par  Final Diagnosis\par           1. Prostate, right base, biopsy\par -Benign prostate tissue.\par \par           2. Prostate, right mid, biopsy\par -Adenocarcinoma of the prostate, Prognostic Grade Group 4\par           (Mikael score 4+4=8) involving 50% (4 mm in length) of 1 of 2\par           core(s).\par -Adenocarcinoma of the prostate, Prognostic Grade Group 1\par           (Mikael score 3+3=6) involving 10% (2 mm in length) of 1 of 2\par           core(s).\par \par           3. Prostate, right apex, biopsy\par -Adenocarcinoma of the prostate, Prognostic Grade Group 4\par           (Mikael score 4+4=8) involving 90% (12 mm in length) of 2 of 2\par           core(s).\par - Perineural invasion seen\par \par           4. Prostate, left base, biopsy\par -Adenocarcinoma of the prostate, Prognostic Grade Group 1\par           (Outlook score 3+3=6) involving 30% and 100% (12 mm and 5 mm in\par           length) of 2 of 3 core(s).\par \par           5. Prostate, left mid, biopsy\par -Adenocarcinoma of the prostate, Prognostic Grade Group 1\par           (Outlook score 3+3=6) involving 15 % (3 mm in length) of 1 of 2\par           core(s).\par \par           6. Prostate, left apex, biopsy\par -Adenocarcinoma of the prostate, Prognostic Grade Group 4\par           (Outlook score 4+4=8) involving 10% and 30% (2 mm and 10 mm in\par           length) of 1 of 2 core(s).\par

## 2023-07-26 NOTE — ASSESSMENT
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 \par \par Pathology report showed high-volume [place] 8 prostate cancer. Bone scan was normal 2018. 8/2018 CT scan showed iliac chain adenopathy up to 2 cm.\par Patient h/o hospitalization 11/2018 for gross hematuria requiring blood transfusions. He has had frequent hospitalizations for gross hematuria/recurrent UTIs. Due the gross hematuria, anticoagulation has been held due to symptoms and h/o frequent falls. \par Patient has previously seen Dr. Karen Hemphill on 12/18/2019 who recommended to continue current regimen, did not feel Xtandi/Zytiga/chemotherapy was needed at that time \par Cystoscopy in May 2019 showed enlarged prostate and trabeculated bladder. \par \par \par PSA:\par 06/2018 98.7\par 3/15/2019 0.26\par 8/28/2020 0.05\par 12/29/2021 0.36\par 7/22/22 1.61\par 1/8/23: PSA 5.43\par 4/14/23: PSA 0.46\par 6/2/23; 0.32\par \par PSMA Pet scan 2/24/23: \par 1. PSMA-positive lesion in right posterior base of prostate gland, extending into right seminal vesicle, corresponds to patient's known prostate carcinoma. The intensity of radiotracer activity indicates high PSMA expression.\par 2. Several subcentimeter PSMA positive lymph nodes in perirectal, presacral, and right external iliac regions are compatible with metastatic disease.\par \par \par Plan: \par -Patient started on q 4 month Lupron and Casodex in 8/31/2018. \par -Patient advised to stop Casodex on 1/27/23 per URO. \par -Patient was given q 6 month Eligard on 1/27/23 Eligard due in July 2023\par - Discussed Enzalutamide with patient/ wife and daughter AE of medication discussed \par - Patient started Xtandi on 3/14/23. Dose reduced to 3 tabs daily on 5/25/23. Advised patient to stop Xtandi given appetite loss and confusion. Patient not tolerating medication \par - Repeat CMP/PSA today \par - MRI BRAIN for confusion \par - f/u with me in 6weeks\par \par

## 2023-07-26 NOTE — HISTORY OF PRESENT ILLNESS
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 Pathology report showed high-volume [place] 8 prostate cancer. Bone scan was normal 2018. 8/2018 CT scan showed iliac chain adenopathy up to 2 cm.\par Patient started on Lupron q 4 months on 8/31/2018 and Casodex \par Patient h/o hospitalization 11/2018 for gross hematuria requiring blood transfusions. he has had frequent hospitalizations for gross hematuria/recurrent UTIs. Due the gross hematuria, anticoagulation has been held due to symptoms and h/o frequent falls. \par Patient has previously seen Dr. Karen Hemphill on 12/18/2019 who recommended to continue current regimen, did not feel Xtandi/Zytiga/chemotherapy was needed at that time \par Cystoscopy in May 2019 showed enlarged prostate and trabeculated bladder. \par Patient currently receives Eligard, 6 month dose was given on 1/27/23. 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 PSMA-positive lesion in right posterior base of prostate gland, extending into right seminal vesicle, corresponds to patient's known prostate carcinoma. The intensity of radiotracer activity indicates high PSMA expression.\par 2. Several subcentimeter PSMA positive lymph nodes in perirectal, presacral, and right external iliac regions are compatible with metastatic disease.\par \par PSA down to 0.46 in April 2023\par Patient stopped Xstandi 4 tabs daily as recommend for 3 days and restarted at 3 tabs a day on 5/25/23 \par Wife and daughter report patient with memory troubles, confusion, anxiety, fatigue, loss of appetite ( 2 ensures a day) not drinking enough water \par Patient present to urgent care on 5/25/23 patient found to have a UTI, Sodium 131, potassium 441, chloride 47, creatinine 0.76, alk phos 113, ast 16, alt 10. Completed abx on 6/1/23. \par Indwelling Silva catheter in place for 3-4 years. \par Patient tolerating lupron well. Patient started Xtandi on 3/14/23\par Patient currently on Lexapro for depression\par \par 7/26/23: Patient seen and examined\par  Poor energy, Wife and daughter reports some memory disturbances \par Patient reports fall howveer cannot be sure \par \par \par

## 2023-07-28 PROBLEM — Z87.898 HISTORY OF GROSS HEMATURIA: Status: RESOLVED | Noted: 2018-07-20 | Resolved: 2023-01-01

## 2023-07-28 NOTE — LETTER BODY
[Dear  ___] : Dear  [unfilled], [Consult Letter:] : I had the pleasure of evaluating your patient, [unfilled]. [Consult Closing:] : Thank you very much for allowing me to participate in the care of this patient.  If you have any questions, please do not hesitate to contact me. [FreeTextEntry1] : Address: [address] [place] [address]\par \par Phone: [phone]

## 2023-07-28 NOTE — ASSESSMENT
[FreeTextEntry1] : Eligard 45 mg injection was given on the left side.  He is following also closely with oncology.  PSA level decreased but he is not on Xtandi at this time due to side effects.  PSMA PET scan was reviewed.  He does not have gross hematuria at this time.  He will follow-up in 6 months for his next Eligard injection.\par \par Alireza Vinson MD, FACS\par [facility]\par  of Urology\par \par [address], [address]\[place] [address]\par \par [address]\[place] [address]\par \par Tel: [phone]\par Fax: [phone]

## 2023-07-28 NOTE — PHYSICAL EXAM
[Urethral Meatus] : meatus normal [Penis Abnormality] : normal circumcised penis [Urinary Bladder Findings] : the bladder was normal on palpation [Scrotum] : the scrotum was normal [Testes Tenderness] : no tenderness of the testes [Testes Mass (___cm)] : there were no testicular masses [Normal Appearance] : normal appearance [Well Groomed] : well groomed [General Appearance - In No Acute Distress] : no acute distress [Abdomen Soft] : soft [Abdomen Tenderness] : non-tender [Costovertebral Angle Tenderness] : no ~M costovertebral angle tenderness [] : no respiratory distress [Respiration, Rhythm And Depth] : normal respiratory rhythm and effort [Exaggerated Use Of Accessory Muscles For Inspiration] : no accessory muscle use [FreeTextEntry1] : Using a walker

## 2023-07-28 NOTE — HISTORY OF PRESENT ILLNESS
[FreeTextEntry1] : He is an 88 year-old man who is seen today with his wife for prostate cancer, rising PSA level, gross hematuria and urinary retention.  He has Silva catheter changes by visiting nurse at home.  He is becoming weaker and with worsening memory loss.  He is receiving a 45 mg Eligard injection.  He used Xtandi for 2 months and then decided to stop it because he was not feeling well.  He is following with oncology.  In July 2023 PSA level was 0.8.  He underwent cystoscopy for gross hematuria in January 2023.  PSMA PET scan in February 2023 showed evidence of prostate cancer in the prostate and seminal vesicle and also right-sided pelvic lymph nodes.\par \par Previous notes: Recently after catheter change, he developed gross hematuria and he was admitted to [facility] and the [facility]'s.  He required change of catheter and CBI.  He did not require any blood transfusion or procedures. Labs showed hematocrit 34, creatinine 0.9, and urine culture showed Klebsiella.  CT scan in November 2022 showed stones or bladder wall calcifications, hyperdense material in the bladder most likely hemorrhage.  Cystoscopy in May 2019 showed enlarged prostate and trabeculated bladder. Urine cytology was benign. \par \par He went to [facility] for a second opinion which agreed with our management of prostate cancer with leuprolide and Casodex. In November 2018, he was admitted to [facility] for gross hematuria. He was placed on continuous bladder irrigation and received blood transfusion. \par PSA was 98. Prostate biopsy showed high-volume Mikael 8 prostate cancer. Bone scan was normal. 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 According to the patient, around 2014, he underwent prostate biopsy in [place] and he was told was negative. In June 2018, urinalysis was negative and PSA level was 98.7. He had a stroke about 10 years ago. Lumbar MRI in the past had shown thickened bladder wall and renal cysts.

## 2023-08-10 PROBLEM — I95.0 IDIOPATHIC HYPOTENSION: Status: ACTIVE | Noted: 2023-01-01

## 2023-08-10 PROBLEM — E78.5 HYPERLIPIDEMIA: Status: ACTIVE | Noted: 2020-08-13

## 2023-08-10 NOTE — ASSESSMENT
[FreeTextEntry1] :  ECG normal sinus rhythm at 89 beats minute  left anterior fascicular block, right bundle branch block pattern LVH  Laboratory data  -----9/16/20-----1/4/2021--7/22/22 Chol---255----------159------160 HDL---- 47-----------48-------41 LDL----186---------- 94------101 Hemoglobin 11.2  6/5/2023 Na+ --131 Alb - 2.9 APhos- 153  Echocardiogram 10/13/2022: Borderline increase in wall thickness LV ejection action 65 to 70% Severe left atrial dilatation Moderate tricuspid regurgitation Severe mitral valve prolapse and regurgitation Severe pulmonary hypertension PA systolic 97 mmHg  Echocardiogram 6/14/2021 [facility] LVH with normal systolic function ejection fraction 60 to 65% Severe pulmonary hypertension 92 mmHg Severe left atrial enlargement Severe mitral regurgitation Moderately severe tricuspid regurgitation  Echocardiogram 12/10/2020: Overall normal left ventricular systolic function. Partially flail posterior leaflet mitral valve with severe eccentric mitral valve regurgitation. Moderate tricuspid regurgitation Severe pulmonary artery hypertension  Event recorder 8/27/2022 9/9/2020 Sinus rhythm with runs of paroxysmal atrial fibrillation accelerated rates sometimes as high as 150 to 160 bpm noted.  Occasional PVCs are noted.  Impression: 1.  RBBB, first-degree and LAFB,  Still no evidence of symptomatic conduction disease  2.  Anticoagulation has been stopped again because of the risks caused by recurrent hematuria and frequent falls.     There has been another fall since last visit further cementing this decision.  Discussed with the family that this is          a risk-benefit analysis and at this point in time, it seems like the appropriate decision     The change in mentation may be due to a TIA/CVA and the family understands that absent anticoagulation in the          face of valvular heart disease with a large left atrium there is a significant risk of embolic events. .      Family understands that we cannot exclude the risk of an embolic event because of a-fib.     Aspirin has not been used because of  recent problem with H. pylori and peptic ulcer disease.  Plavix has been           tolerated so far.  3.  There is a history of severe mitral regurgitation clearly heard on examination, and this is probably the underlying substrate that contributes to the atrial fibrillation.   4.  The current echocardiogram continues to demonstrate 3-4+ eccentric mitral regurgitation with severe (nearly systemic) pulmonary hypertension and LVH and normal left ventricular systolic function.  Do not see any major changes on this echocardiogram but nonetheless the patient appears to be asymptomatic of heart failure  5.  generalized failure to thrive is evident.  Blood pressure is quite low today 82 systolic but generally running 100 millimeters of mercury or higher.  There is been no loss of consciousness nor any near LOC.   Spironolactone has been stopped and furosemide changed to every other day  Plan: 1.  Spironolactone has been stopped and furosemide will be changed to every other day.     A BMP should be obtained in 2 weeks  2.   instructed wife to contact me if blood pressure stays under 100 or there is witnessed dizziness or loss of consciousness.  3.  Neuro evaluation was emphasized.  Patient has seen Dr. Rodo Berumen in the past  4.  Meclizine 25 mg p.o. as needed for the vertigo.  5.  Continue Plavix 75 mg p.o. daily to least have some form of anticoagulation in the face of PAF and history of           CVA.  6.  Blood work and clinical follow-up in 3 months if there are no clinical changes  7.  Echocardiogram on an annual basis  Contact me if there are any new symptoms of shortness of breath, orthopnea or edema.

## 2023-08-10 NOTE — REASON FOR VISIT
[FreeTextEntry1] : Patient presents here for  cardiac re- evaluation re management of PAF and MR, SOB/ HFpEF   Patient's major medical problem at this point in time is advanced prostate cancer for which he is now being treated with hormonal therapy and has an indwelling Silva.  He has had several bouts of hematuria and recent UTI's   Per the wife, patient is having problems with overall failure to thrive.  Diminished p.o. intake with periodic vomiting.   An MRI of the brain was recently obtained to assess his neurologic status further.     There has been no loss of consciousness.      Patient is clearly much less verbal about his overall health Intercurrent medical issues have regarded treatment of prostate cancer with Xtandi which resulted in memory loss weakness anorexia.  Patient subsequently has been unfocused and had poor mentation.  There has been anorexia and weight loss.  He is now going for fluid infusions at the chemo center.   He has some mild baseline shortness of breath but this has been less concerning to him more recently.  In [facility] 6/12/2021 to 6/17/2021 with acute gangrenous cholecystitis.- emergency laparoscopic cholecystectomy. Cardiology consultation performed may have been with another group.  We were not informed.  His cardiac history is that of: Paroxysmal atrial fibrillation  Severe mitral valve insufficiency.  History of a CVA 2008.  History of hypertension.  History of hyperlipidemia.  We also elected to begin clopidogrel for the atrial fibrillation.  Issues of falling and hematuria prevented conventional anticoagulation and the patient peptic ulcer disease preempted use of aspirin.  He did sustain a recent presumed mechanical fall per the family.  There was not much in the way of traumatic damage. 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 CVA in [place]  2008 and at that time was begun on Coumadin therapy which he had continued until bouts of hematuria led to the discontinuation of Coumadin.  Frequent falls created an additional anticoagulation concern.  He has baseline exertional fatigability but moves very slowly with the use of a walker.    There is no chest pain, palpitations, PND edema.    The falls all seem to be due to balance issues, not necessarily due to any loss of vision or consciousness.   He has been tolerating 25 mg of metoprolol daily without any significant dizziness.

## 2023-08-15 NOTE — ED PROVIDER NOTE - PATIENT PORTAL LINK FT
You can access the FollowMyHealth Patient Portal offered by [facility] by registering at the following website: [email or web address]. By joining [facility]’s FollowMyHealth portal, you will also be able to view your health information using other applications (apps) compatible with our system.

## 2023-08-15 NOTE — ED PROVIDER NOTE - OBJECTIVE STATEMENT
Pt is an 89 yo M co urinary retention. Pt has a chronic indwelling morrison that is changed monthly.  morrison was changed today and is now not draining urine. Pt states that he has mild lower abd pain. no n/v. no fever/chills. no other complaints.

## 2023-08-15 NOTE — ED PROVIDER NOTE - CLINICAL SUMMARY MEDICAL DECISION MAKING FREE TEXT BOX
on initial eval bedside US showed minimal urine in bladder and morrison roughly in place with urine in the bag.  CT done and found morrison was actually in prostate. morrison removed and replaced with good urine flow.  Will d/c with outpatient f/up and return instructions.  Pt given a copy of all results and instructed to f/up with pcp regarding any abnormal results.

## 2023-08-15 NOTE — ED ADULT NURSE NOTE - OBJECTIVE STATEMENT
88 year old male presents to ED with c/o urinary retention and mild lower abdominal pain.  A&Ox4  Patient has a chronic indwelling Silva that gets changed once a month.  Silva was changed today and is now not draining urine.  Seen and evaluated by provider.  Silva changed by Dr. Junior without difficulty.  Draining clear yellow urine.  Patient medically cleared for discharge.

## 2023-08-15 NOTE — ED PROVIDER NOTE - PHYSICAL EXAMINATION
Constitutional - well-developed.   Head - NCAT. Airway patent.   Eyes - PERRL.   CV - RRR. no murmur. no edema.   Pulm - CTAB.   Abd - soft, mild suprapubic ttp. no rebound. no guarding.   Neuro - A&Ox3. strength 5/5 x4. sensation intact x4. normal gait.   Skin - No rash. .  MSK - normal ROM.

## 2023-08-15 NOTE — ED ADULT NURSE NOTE - NSFALLHARMRISKINTERV_ED_ALL_ED

## 2023-08-15 NOTE — ED ADULT TRIAGE NOTE - CHIEF COMPLAINT QUOTE
pt ANDREW from home, states he has his morrison changed once a month, was changed today but has had no urine output since.

## 2023-08-25 PROBLEM — I63.9 STROKE/CEREBROVASCULAR ACCIDENT: Status: ACTIVE | Noted: 2018-06-15

## 2023-08-25 PROBLEM — G31.84 MILD COGNITIVE IMPAIRMENT WITH MEMORY LOSS: Status: ACTIVE | Noted: 2023-01-01

## 2023-08-25 NOTE — DATA REVIEWED
[de-identified] : He had a brain MRI done with and without contrast on August 8, 2023.  Additionally he went back on August 22 for diffusion-weighted imaging.  He did not have any abnormal enhancement following contrast administration.  They did find a smooth thickening of the packing meninges more in the left cerebral convexity.  Which is nonspecific but may have represented a previous subdural hematoma or inflammatory issue.  There is significant amount of small vessel ischemic changes, as well as old bilateral chronic infarcts.  There is no restricted diffusion to suggest acute stroke.

## 2023-08-25 NOTE — REASON FOR VISIT
[Consultation] : a consultation visit [Other: _____] : [unfilled] [FreeTextEntry1] : Confusion, history of stroke

## 2023-08-25 NOTE — HISTORY OF PRESENT ILLNESS
[FreeTextEntry1] : Initial office visit August 25, 2023: This is an 88-year-old man who presents today with complaint of cognitive decline.  He has a history of prostate cancer that is metastatic.  He is no longer on chemotherapy due to side effects.  He also has a history of stroke in 2008 with mild right hemiparesis.  He fell about 3 to 4 months ago and since then has shown increasing confusion and is sometimes mild agitation or seeing or seeing things that are not there.  He also has a chronic Silva catheter with risk of recurrent UTI.  He also has a decrease in appetite and an increase in nausea and vomiting.  He had an MRI of his brain recently which will be discussed below.  He is here today for neurologic evaluation.

## 2023-08-25 NOTE — ASSESSMENT
[FreeTextEntry1] : This is an 88-year-old man with confusion.  His MRI did not show any acute strokes but did show chronic strokes.  He does have risk factors of atrial fibrillation as well as previous metastatic cancer, hypertension, and previous history of heavy smoking as well as high cholesterol.  He is on Plavix for his atrial fibrillation as he cannot tolerate aspirin due to GI issues and has had a significant falls risk which precludes him from anticoagulation.  I would continue the Plavix.  This may represent an early vascular dementia.  Does not appear to be Alzheimer's type dementia.  Regarding his nausea or vomiting I would consult with GI and oncology.  I will follow-up with him in 4 months, sooner should the need arise.

## 2023-08-25 NOTE — REVIEW OF SYSTEMS
[Confused or Disoriented] : confusion [Memory Lapses or Loss] : memory loss [Difficulty Walking] : difficulty walking [As Noted in HPI] : as noted in HPI [Vomiting] : vomiting [Negative] : Heme/Lymph

## 2023-08-25 NOTE — PHYSICAL EXAM
[General Appearance - Alert] : alert [General Appearance - In No Acute Distress] : in no acute distress [FreeTextEntry1] : Appeared to be frail and thin [Person] : oriented to person [Place] : oriented to place [Time] : disoriented to time [Short Term Intact] : short term memory intact [Remote Intact] : remote memory intact [Registration Intact] : recent registration memory intact [Span Intact] : the attention span was normal [Concentration Intact] : normal concentrating ability [Visual Intact] : visual attention was ~T not ~L decreased [Naming Objects] : no difficulty naming common objects [Repeating Phrases] : no difficulty repeating a phrase [Fluency] : fluency intact [Comprehension] : comprehension intact [Past History] : adequate knowledge of personal past history [Cranial Nerves Optic (II)] : visual acuity intact bilaterally,  visual fields full to confrontation, pupils equal round and reactive to light [Cranial Nerves Oculomotor (III)] : extraocular motion intact [Cranial Nerves Trigeminal (V)] : facial sensation intact symmetrically [Cranial Nerves Facial (VII)] : face symmetrical [Cranial Nerves Vestibulocochlear (VIII)] : hearing was intact bilaterally [Cranial Nerves Glossopharyngeal (IX)] : tongue and palate midline [Cranial Nerves Accessory (XI - Cranial And Spinal)] : head turning and shoulder shrug symmetric [Cranial Nerves Hypoglossal (XII)] : there was no tongue deviation with protrusion [Motor Tone] : muscle tone was normal in all four extremities [Involuntary Movements] : no involuntary movements were seen [No Muscle Atrophy] : normal bulk in all four extremities [Motor Handedness Left-Handed] : the patient is left hand dominant [Hemiparesis Of Right Side] : hemiparesis was present on the right [Paresis Pronator Drift Right-Sided] : a right-sided pronator drift was present [Paresis Pronator Drift Left-Sided] : no pronator drift on the left [Motor Strength Upper Extremities Right] : there was weakness of the right upper extremity [Motor Strength Upper Extremities Left] : strength was normal in the left upper extremity [Motor Strength Lower Extremities Right] : there was weakness of the right lower extremity [Motor Strength Lower Extremities Left] : strength was normal in the left lower extremity [Sensation Tactile Decrease] : light touch was intact [Sensation Pain / Temperature Decrease] : pain and temperature was intact [Sensation Vibration Decrease] : vibration was intact [Proprioception] : proprioception was intact [Tremor] : no tremor present [Coordination - Dysmetria Impaired Finger-to-Nose Bilateral] : not present [2+] : Patella left 2+ [FreeTextEntry4] : 2 out of 3 recall at 5 minutes, 3 out of 3 with prompt [FreeTextEntry6] : Mild right hemiparesis [FreeTextEntry8] : Slight right hemiparetic gait [Sclera] : the sclera and conjunctiva were normal [PERRL With Normal Accommodation] : pupils were equal in size, round, reactive to light, with normal accommodation [Extraocular Movements] : extraocular movements were intact [No APD] : no afferent pupillary defect [No KELLI] : no internuclear ophthalmoplegia [Full Visual Field] : full visual field

## 2023-08-25 NOTE — CONSULT LETTER
[Dear  ___] : Dear  [unfilled], [Consult Letter:] : I had the pleasure of evaluating your patient, [unfilled]. [Please see my note below.] : Please see my note below. [Consult Closing:] : Thank you very much for allowing me to participate in the care of this patient.  If you have any questions, please do not hesitate to contact me. [Sincerely,] : Sincerely, [FreeTextEntry3] : Dejan Burleson M.D., Ph.D. DPN-N [facility] [place] Director, [facility]

## 2023-09-28 NOTE — ASSESSMENT
From: Jennifer Loredo  To: Franny Salinas  FreeTextEntry1] : Patient with Stage MIRZA adenocarcinoma of the prostate-clinically stable, receiving combined androgen blockade. Discussed treatment options/systemic therapy alternatives in prostate cancer management. As patient is clinically stable, and tolerating current treatment well thus far, suggest continuing current hormonal therapy at this time, rather than adding additional medication such as Xtandi/Zytiga/chemotherapy. \par At this time, patient will continue followup with his urologist for his prostate cancer treatment, and is considering obtaining a medical oncologist with whom to follow, closer to his home. Patient's and his family's questions have been answered to their apparent satisfaction, and they are appreciative of care. They will call should they wish for our future input.\par

## 2023-10-20 NOTE — ED PROVIDER NOTE - NSFOLLOWUPINSTRUCTIONS_ED_ALL_ED_FT
Follow-up with Urology within the next week as discussed.    Advance activity as tolerated.  Continue all previously prescribed medications as directed unless otherwise instructed.  Follow up with your primary care physician in 48-72 hours- bring copies of your results.  Return to the ER for worsening or persistent symptoms, and/or ANY NEW OR CONCERNING SYMPTOMS fever, chest pain, shortness of breath/difficulty breathing, abdominal pain with intractable nausea/vomiting, weakness/numbness/tingling of arms or legs, inability/difficulty with urination, no urine drainage from the morrison catheter, lightheadedness/dizziness, vision changes, or headaches. If you have issues obtaining follow up, please call: [phone] to obtain a doctor or specialist who takes your insurance in your area.  You may call [phone] to make an appointment with the internal medicine clinic.    Indwelling Urinary Catheter Insertion        For people with certain conditions, urine is not able to move normally through the part of the body that drains urine from the bladder (urethra). An indwelling urinary catheter is a thin, sterile tube (catheter) that is placed (inserted) into the bladder through the urethra to help drain urine out of the body. After the catheter is inserted, it is held in place by a small balloon on the catheter that is filled with sterile water. Urine drains from the catheter into a drainage bag outside of the body. An indwelling urinary catheter may be needed if you have urinary retention problems or bladder obstruction. It may also be needed during and after surgical procedures and for other medical conditions.    Tell a health care provider about:  Any allergies you have.  Any surgeries you have had.  Any medical conditions you have.  Any blood disorders you have.    What are the risks?  Generally, this is a safe procedure. However, problems may occur, including:    Infection.  Bleeding.  Damage to other structures or organs.    What happens before the procedure?  Your health care provider will inspect your urethra before inserting the catheter.    What happens during the procedure?  To lower your risk of infection:    Your health care team will wash or sanitize their hands.  Your skin will be washed with soap.  A lubricant will be placed on the catheter to ease the insertion of it into the urethra.  The catheter will be inserted into the urethra until you can see urine in the drainage bag. After the urine starts to flow, the catheter may be inserted another couple of inches (about 5 cm).  Sterile water will be used to inflate the balloon to hold the catheter in place.  After the catheter balloon is inflated, it will be pulled back so it is against the narrow opening at the end of the bladder.  Your health care provider will check for urine flow into the drainage bag.    The procedure may vary among health care providers and hospitals.    What happens after the procedure?  Urine in the drainage bag will be emptied and measured by your health care provider while you are in the hospital.  Your health care provider will remove the catheter for you. This will be done when the catheter is no longer necessary, which is likely to be before you leave the hospital.    Summary  An indwelling catheter is a sterile tube that is placed into the bladder through the urethra to help drain urine out of the body.  The catheter will be removed as soon as it is no longer necessary.    ADDITIONAL NOTES AND INSTRUCTIONS    Please follow up with your Primary MD in 24-48 hr.  Seek immediate medical care for any new/worsening signs or symptoms.

## 2023-10-20 NOTE — ED PROVIDER NOTE - ATTENDING APP SHARED VISIT CONTRIBUTION OF CARE
89 y/o M hx of prostate cancer on oral hormone therapy, no recent chemo/radiation per daughter / wife presents w/ morrison catheter issue. states gets morrison catheter monthly change, was taken out at home today by aide but unable to place new one. does not know morrison catheter size. stopped plavix yesterday but not on any other blood thinners. denies abdominal pain. denies nausea/vomiting. denies fever/chills.   will place new morrison catheter in, check basic labs. patient is requesting PSA level to be checked since he has been unable to go see his oncologist/ urologist the past few months. check ua/uc.   I performed a history and physical exam of the patient and discussed their management with the YANET. I have reviewed the YANET note and agree with the documented findings and plan of care, except as noted. This was a shared visit with an YANET. I reviewed and verified the documentation and independently performed my own history/exam/medical decision making. My medical decision making and observations are found above. Please refer to any progress notes for updates on clinical course.

## 2023-10-20 NOTE — ED ADULT NURSE NOTE - NSICDXPASTMEDICALHX_GEN_ALL_CORE_FT
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

## 2023-10-20 NOTE — ED PROVIDER NOTE - CLINICAL SUMMARY MEDICAL DECISION MAKING FREE TEXT BOX
88M with PMH HTN, HLD, CVA (w/ residual RT weakness), Prostate Cancer (on chronic indwelling morrison) who presents to ED for morrison placement. Patient currently afebrile, hemodynamically stable, spO2 100%. Based on history and physical, differentials include but are not limited to . Plan to assess patient for acute pathology as listed above with . Will administer medications for symptomatic relief, follow-up on results, and reassess.

## 2023-10-20 NOTE — ED PROVIDER NOTE - OBJECTIVE STATEMENT
88M with PMH HTN, HLD, CVA (w/ residual RT weakness), Prostate Cancer (on chronic indwelling morrison) who presents to ED for morrison placement.

## 2023-10-20 NOTE — ED PROVIDER NOTE - PROGRESS NOTE DETAILS
CODI Montero: Workup reviewed. Results endorsed including unexpected incidental findings (copy of reports provided to patient). Shared Decision Making - Reassessment performed. Patient is medically stable for discharge. Strict return precautions given, discussed red flag signs/symptoms. Patient to follow up with PMD. Patient displays understanding and agreeable with plan, comfortable with discharge plan home. Plan for discharge discussed with  who agrees with disposition and discharge plan.

## 2023-10-20 NOTE — ED ADULT NURSE NOTE - OBJECTIVE STATEMENT
88 yr old male AOx1. C/o hematuria this morning. Pt has a chronic morrison d/t prostate cancer. Nurse reports increased resistance inserting and hematuria with removal. Pt denies pain at this time. PMH of prostate ca, mitral regurgitation, HTN, HLD, graves disease, CVA with residual R sided weakness, and BCC. Pt is hard of hearing. Had a fall 2 months ago, unable to ambulate independently since. Pt is a poor historian, Wife at bedside providing history. Pt denies CP, SOB, N/V/D, fever/chills, h/a.

## 2023-10-20 NOTE — ED ADULT TRIAGE NOTE - CHIEF COMPLAINT QUOTE
pt from home, blood and clots noted after catheter removal at 11am. takes rx: plavix. hx: hard of hearing, cva with right side weakness.

## 2023-10-20 NOTE — ED ADULT NURSE REASSESSMENT NOTE - NS ED NURSE REASSESS COMMENT FT1
16 FR coude morrison inserted, pt tolerated well. 200ml of yellow urine in bag post coude morrison inserted. UA and CNS sent to lab.

## 2023-10-20 NOTE — ED PROVIDER NOTE - NS ED ATTENDING STATEMENT MOD
This was a shared visit with the YANET. I reviewed and verified the documentation and independently performed the documented:

## 2023-10-20 NOTE — ED ADULT NURSE NOTE - MODE OF DISCHARGE
CBC is normal; no signs of anemia/infection/autoimmune dysfunction.  Recheck as needed.    BMP demonstrates normal electrolytes, fasting blood sugar, normal serum creatinine, sub therapeutic hydration/BUN with decreased calculated GFR with no other previous findings/lab tests available for comparison.  Patient is medically clear for surgery.  Results to be forwarded to patient's PCP for further evaluation and management.   Stretcher

## 2023-10-20 NOTE — ED PROVIDER NOTE - PATIENT PORTAL LINK FT
You can access the FollowMyHealth Patient Portal offered by [facility] by registering at the following website: [email or web address]. By joining [employer]’s FollowMyHealth portal, you will also be able to view your health information using other applications (apps) compatible with our system.

## 2023-11-22 PROBLEM — M21.371 RIGHT FOOT DROP: Status: ACTIVE | Noted: 2023-01-01

## 2023-11-22 PROBLEM — M48.061 SPINAL STENOSIS OF LUMBAR REGION, UNSPECIFIED WHETHER NEUROGENIC CLAUDICATION PRESENT: Status: ACTIVE | Noted: 2023-01-01

## 2023-12-20 PROBLEM — F03.90 UNSPECIFIED DEMENTIA WITHOUT BEHAVIORAL DISTURBANCE: Chronic | Status: ACTIVE | Noted: 2023-01-01

## 2023-12-30 NOTE — ED PROVIDER NOTE - PATIENT PORTAL LINK FT
You can access the FollowMyHealth Patient Portal offered by [facility] by registering at the following website: [email or web address]. By joining [employer]’s FollowMyHealth portal, you will also be able to view your health information using other applications (apps) compatible with our system. You can access the FollowMyHealth Patient Portal offered by [facility] by registering at the following website: [email or web address]. By joining [employer]’s FollowMyHealth portal, you will also be able to view your health information using other applications (apps) compatible with our system.

## 2023-12-30 NOTE — ED PROVIDER NOTE - OBJECTIVE STATEMENT
Patient is a 88-year-old gentleman with a past medical history of prostate cancer on chemo, history of CVA on Plavix who presents to the ED because of diminished urinary output from his catheter.  Patient had a visiting nurse come in for his scheduled catheter change, after the change they noticed that there was no urine output and so sent to the ED.  Patient denies any pain, no fever, son-in-law says that this happens after every catheter change, not sure why.

## 2023-12-30 NOTE — ED ADULT NURSE NOTE - NSFALLUNIVINTERV_ED_ALL_ED
Bed/Stretcher in lowest position, wheels locked, appropriate side rails in place/Call bell, personal items and telephone in reach/Instruct patient to call for assistance before getting out of bed/chair/stretcher/Non-slip footwear applied when patient is off stretcher/[place] to call system/Physically safe environment - no spills, clutter or unnecessary equipment/Purposeful proactive rounding/Room/bathroom lighting operational, light cord in reach Bed/Stretcher in lowest position, wheels locked, appropriate side rails in place/Call bell, personal items and telephone in reach/Instruct patient to call for assistance before getting out of bed/chair/stretcher/Non-slip footwear applied when patient is off stretcher/[place] to call system/Physically safe environment - no spills, clutter or unnecessary equipment/Purposeful proactive rounding/Room/bathroom lighting operational, light cord in reach

## 2023-12-30 NOTE — ED PROVIDER NOTE - PROGRESS NOTE DETAILS
Silva changed, initially no urine output, but after fluids, pt is making urine again. Pt son in law says pt does not usually drink much fluids. Pt is eating and drinking in ed, comfortable, and ready for d/c. Return precautions provided.

## 2023-12-30 NOTE — ED PROVIDER NOTE - CLINICAL SUMMARY MEDICAL DECISION MAKING FREE TEXT BOX
Ddx: Morrison catheter malfunction or obstruction vs pt dehydration  plan: change morrison, reassess

## 2023-12-30 NOTE — ED ADULT NURSE NOTE - OBJECTIVE STATEMENT
pt presents to the ED c/o urinary catheter complication. Catheter not draining. denies fever, chills, burning, discharge. hx prostate cancer. Blood-tinge urine noted in catheter

## 2024-01-01 ENCOUNTER — APPOINTMENT (OUTPATIENT)
Dept: UROLOGY | Facility: CLINIC | Age: 89
End: 2024-01-01

## 2024-01-01 ENCOUNTER — APPOINTMENT (OUTPATIENT)
Dept: CARDIOLOGY | Facility: CLINIC | Age: 89
End: 2024-01-01
Payer: MEDICAID

## 2024-01-01 ENCOUNTER — APPOINTMENT (OUTPATIENT)
Dept: NEUROLOGY | Facility: CLINIC | Age: 89
End: 2024-01-01

## 2024-01-01 ENCOUNTER — APPOINTMENT (OUTPATIENT)
Dept: UROLOGY | Facility: CLINIC | Age: 89
End: 2024-01-01
Payer: MEDICAID

## 2024-01-01 ENCOUNTER — APPOINTMENT (OUTPATIENT)
Dept: CARDIOLOGY | Facility: CLINIC | Age: 89
End: 2024-01-01

## 2024-01-01 ENCOUNTER — LABORATORY RESULT (OUTPATIENT)
Age: 89
End: 2024-01-01

## 2024-01-01 VITALS
DIASTOLIC BLOOD PRESSURE: 43 MMHG | BODY MASS INDEX: 21.62 KG/M2 | HEIGHT: 63 IN | WEIGHT: 122 LBS | TEMPERATURE: 98 F | SYSTOLIC BLOOD PRESSURE: 90 MMHG | HEART RATE: 77 BPM | RESPIRATION RATE: 16 BRPM | OXYGEN SATURATION: 98 %

## 2024-01-01 VITALS
OXYGEN SATURATION: 95 % | BODY MASS INDEX: 19.49 KG/M2 | HEIGHT: 63 IN | DIASTOLIC BLOOD PRESSURE: 60 MMHG | HEART RATE: 91 BPM | RESPIRATION RATE: 16 BRPM | WEIGHT: 110 LBS | SYSTOLIC BLOOD PRESSURE: 90 MMHG

## 2024-01-01 VITALS
OXYGEN SATURATION: 95 % | RESPIRATION RATE: 16 BRPM | SYSTOLIC BLOOD PRESSURE: 95 MMHG | HEART RATE: 81 BPM | DIASTOLIC BLOOD PRESSURE: 55 MMHG | HEIGHT: 63 IN

## 2024-01-01 DIAGNOSIS — I50.32 CHRONIC DIASTOLIC (CONGESTIVE) HEART FAILURE: ICD-10-CM

## 2024-01-01 DIAGNOSIS — R06.09 OTHER FORMS OF DYSPNEA: ICD-10-CM

## 2024-01-01 DIAGNOSIS — R33.9 RETENTION OF URINE, UNSPECIFIED: ICD-10-CM

## 2024-01-01 DIAGNOSIS — I48.0 PAROXYSMAL ATRIAL FIBRILLATION: ICD-10-CM

## 2024-01-01 DIAGNOSIS — C61 MALIGNANT NEOPLASM OF PROSTATE: ICD-10-CM

## 2024-01-01 DIAGNOSIS — I34.0 NONRHEUMATIC MITRAL (VALVE) INSUFFICIENCY: ICD-10-CM

## 2024-01-01 DIAGNOSIS — I27.21 SECONDARY PULMONARY ARTERIAL HYPERTENSION: ICD-10-CM

## 2024-01-01 LAB
-  AMIKACIN: SIGNIFICANT CHANGE UP
-  AMIKACIN: SIGNIFICANT CHANGE UP
-  AMPICILLIN: SIGNIFICANT CHANGE UP
-  AMPICILLIN: SIGNIFICANT CHANGE UP
-  AZTREONAM: SIGNIFICANT CHANGE UP
-  AZTREONAM: SIGNIFICANT CHANGE UP
-  CEFEPIME: SIGNIFICANT CHANGE UP
-  CEFEPIME: SIGNIFICANT CHANGE UP
-  CEFTAZIDIME: SIGNIFICANT CHANGE UP
-  CEFTAZIDIME: SIGNIFICANT CHANGE UP
-  CIPROFLOXACIN: SIGNIFICANT CHANGE UP
-  IMIPENEM: SIGNIFICANT CHANGE UP
-  IMIPENEM: SIGNIFICANT CHANGE UP
-  LEVOFLOXACIN: SIGNIFICANT CHANGE UP
-  MEROPENEM: SIGNIFICANT CHANGE UP
-  MEROPENEM: SIGNIFICANT CHANGE UP
-  NITROFURANTOIN: SIGNIFICANT CHANGE UP
-  NITROFURANTOIN: SIGNIFICANT CHANGE UP
-  PIPERACILLIN/TAZOBACTAM: SIGNIFICANT CHANGE UP
-  PIPERACILLIN/TAZOBACTAM: SIGNIFICANT CHANGE UP
-  TETRACYCLINE: SIGNIFICANT CHANGE UP
-  TETRACYCLINE: SIGNIFICANT CHANGE UP
-  VANCOMYCIN: SIGNIFICANT CHANGE UP
-  VANCOMYCIN: SIGNIFICANT CHANGE UP
CULTURE RESULTS: ABNORMAL
CULTURE RESULTS: ABNORMAL
METHOD TYPE: SIGNIFICANT CHANGE UP
ORGANISM # SPEC MICROSCOPIC CNT: ABNORMAL
ORGANISM # SPEC MICROSCOPIC CNT: SIGNIFICANT CHANGE UP
ORGANISM # SPEC MICROSCOPIC CNT: SIGNIFICANT CHANGE UP
PSA SERPL-MCNC: 0.05 NG/ML
SPECIMEN SOURCE: SIGNIFICANT CHANGE UP
SPECIMEN SOURCE: SIGNIFICANT CHANGE UP

## 2024-01-01 PROCEDURE — 93000 ELECTROCARDIOGRAM COMPLETE: CPT

## 2024-01-01 PROCEDURE — 99215 OFFICE O/P EST HI 40 MIN: CPT

## 2024-01-01 PROCEDURE — 51703 INSERT BLADDER CATH COMPLEX: CPT

## 2024-01-01 PROCEDURE — 96402 CHEMO HORMON ANTINEOPL SQ/IM: CPT

## 2024-01-01 PROCEDURE — 99214 OFFICE O/P EST MOD 30 MIN: CPT | Mod: 25

## 2024-01-01 PROCEDURE — G2211 COMPLEX E/M VISIT ADD ON: CPT | Mod: NC,1L

## 2024-01-01 RX ORDER — FUROSEMIDE 20 MG/1
20 TABLET ORAL DAILY
Qty: 30 | Refills: 3 | Status: ACTIVE | COMMUNITY

## 2024-01-01 RX ORDER — LEUPROLIDE ACETATE 45 MG/.375ML
45 INJECTION, SUSPENSION, EXTENDED RELEASE SUBCUTANEOUS
Refills: 0 | Status: COMPLETED | OUTPATIENT
Start: 2024-01-01

## 2024-01-01 RX ORDER — FUROSEMIDE 20 MG/1
20 TABLET ORAL
Qty: 45 | Refills: 3 | Status: DISCONTINUED | COMMUNITY
Start: 2020-12-11 | End: 2024-01-01

## 2024-01-01 RX ORDER — SUCRALFATE 1 G/1
1 TABLET ORAL 4 TIMES DAILY
Refills: 0 | Status: ACTIVE | COMMUNITY

## 2024-01-01 RX ORDER — CALCIUM ACETATE 667 MG/1
667 TABLET ORAL
Refills: 0 | Status: ACTIVE | COMMUNITY

## 2024-01-01 RX ORDER — PANTOPRAZOLE 40 MG/1
40 TABLET, DELAYED RELEASE ORAL DAILY
Qty: 30 | Refills: 5 | Status: ACTIVE | COMMUNITY

## 2024-01-01 RX ORDER — CIPROFLOXACIN LACTATE 400MG/40ML
1 VIAL (ML) INTRAVENOUS
Qty: 10 | Refills: 0
Start: 2024-01-01 | End: 2024-01-01

## 2024-01-01 RX ADMIN — LEUPROLIDE ACETATE 0 MG: 45 INJECTION, SUSPENSION, EXTENDED RELEASE SUBCUTANEOUS at 00:00

## 2024-02-09 PROBLEM — C61 PROSTATE CANCER: Status: ACTIVE | Noted: 2018-08-17

## 2024-02-09 PROBLEM — R33.9 URINARY RETENTION: Status: ACTIVE | Noted: 2020-04-23

## 2024-02-09 NOTE — HISTORY OF PRESENT ILLNESS
[FreeTextEntry1] : He is an 89 year-old man who is seen today with his wife for prostate cancer and urinary retention.  Silva catheter changes have been normal visiting nurse but now they are going to emergency room on a monthly basis because the catheter is not draining.  The catheter is apparently leaking right now.  He is on continuous ADT with Eligard injections.  His daughter on the phone indicates that he had adverse reaction to chemotherapy for prostate cancer and he is not receiving any treatments from oncology at this time.  Apparently PSA level was 0.11 in October 2023.  In July 2023 PSA level was 0.18.  He is using a wheelchair at this time.  He was on Xtandi previously.  He underwent cystoscopy for gross hematuria in January 2023.  PSMA PET scan in February 2023 showed evidence of prostate cancer in the prostate and seminal vesicle and also right-sided pelvic lymph nodes.  Previous notes: CT scan in November 2022 showed stones or bladder wall calcifications, hyperdense material in the bladder most likely hemorrhage.  Cystoscopy in May 2019 showed enlarged prostate and trabeculated bladder. Urine cytology was benign.  He has had several episodes of gross hematuria.  PSA was 98. Prostate biopsy showed high-volume Mikael 8 prostate cancer. Bone scan was normal. 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 He started androgen deprivation therapy with Lupron and Casodex in 2018. 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 According to the patient, around 2014, he underwent prostate biopsy in [place] and he was told was negative. In June 2018, urinalysis was negative and PSA level was 98.7. He had a stroke about 10 years ago. Lumbar MRI in the past had shown thickened bladder wall and renal cysts.

## 2024-02-09 NOTE — PHYSICAL EXAM
[Urethral Meatus] : meatus normal [Penis Abnormality] : normal circumcised penis [Urinary Bladder Findings] : the bladder was normal on palpation [Scrotum] : the scrotum was normal [Testes Tenderness] : no tenderness of the testes [Testes Mass (___cm)] : there were no testicular masses [FreeTextEntry1] : Silva catheter draining clear urine [General Appearance - Well Developed] : well developed [General Appearance - Well Nourished] : well nourished [Normal Appearance] : normal appearance [Well Groomed] : well groomed [General Appearance - In No Acute Distress] : no acute distress [Abdomen Soft] : soft [Abdomen Tenderness] : non-tender [Affect] : the affect was normal [Mood] : the mood was normal [Not Anxious] : not anxious

## 2024-02-09 NOTE — LETTER BODY
[Dear  ___] : Dear  [unfilled], [Consult Letter:] : I had the pleasure of evaluating your patient, [unfilled]. [Consult Closing:] : Thank you very much for allowing me to participate in the care of this patient.  If you have any questions, please do not hesitate to contact me. [FreeTextEntry1] : Address: [address] [place] [address]\par  \par  Phone: [phone]

## 2024-02-09 NOTE — ASSESSMENT
[FreeTextEntry1] : PSA level will be checked.  Silva catheter was changed today.  Eligard 45 mg, 6 months injection was given on the left side.  Continue vitamin D and calcium.  He will follow-up in 6 months for the next injection.  Also he will continue to follow-up with oncology.  He wants to have his catheter changes done here and he will follow-up in 1 month for catheter change.  Alireza Vinson MD, FACS [facility]  of Urology [address] [place] [address], [place] [address] Tel: [phone] Fax: [phone]

## 2024-03-19 PROBLEM — I34.0 NONRHEUMATIC MITRAL VALVE REGURGITATION: Status: ACTIVE | Noted: 2024-01-01

## 2024-03-19 NOTE — PHYSICAL EXAM
[Normal Conjunctiva] : the conjunctiva exhibited no abnormalities [Eyelids - No Xanthelasma] : the eyelids demonstrated no xanthelasmas [Normal Oral Mucosa] : normal oral mucosa [No Oral Cyanosis] : no oral cyanosis [No Oral Pallor] : no oral pallor [Normal Jugular Venous A Waves Present] : normal jugular venous A waves present [Normal Jugular Venous V Waves Present] : normal jugular venous V waves present [No Jugular Venous Schneider A Waves] : no jugular venous schneider A waves [Abdomen Soft] : soft [Abdomen Tenderness] : non-tender [Abdomen Mass (___ Cm)] : no abdominal mass palpated [Skin Turgor] : normal skin turgor [] : no rash [No Anxiety] : not feeling anxious [Impaired Insight] : insight and judgment were intact [FreeTextEntry1] : Blunted affect [de-identified] : Diminished breath sounds approximately shelter up on the left with dullness.

## 2024-03-19 NOTE — ASSESSMENT
[FreeTextEntry1] :  ECG normal sinus rhythm at 81 beats minute  left anterior fascicular block, right bundle branch block pattern LVH  Laboratory data  -----9/16/20-----1/4/2021--7/22/22 Chol---255----------159------160 HDL---- 47-----------48-------41 LDL----186---------- 94------101 Hemoglobin 11.2  6/5/2023 Na+ --131 Alb - 2.9 APhos- 153  Echocardiogram 10/13/2022: Borderline increase in wall thickness LV ejection action 65 to 70% Severe left atrial dilatation Moderate tricuspid regurgitation Severe mitral valve prolapse and regurgitation Severe pulmonary hypertension PA systolic 97 mmHg  Echocardiogram 6/14/2021 [facility] LVH with normal systolic function ejection fraction 60 to 65% Severe pulmonary hypertension 92 mmHg Severe left atrial enlargement Severe mitral regurgitation Moderately severe tricuspid regurgitation  Echocardiogram 12/10/2020: Overall normal left ventricular systolic function. Partially flail posterior leaflet mitral valve with severe eccentric mitral valve regurgitation. Moderate tricuspid regurgitation Severe pulmonary artery hypertension  Event recorder 8/27/2022 9/9/2020 Sinus rhythm with runs of paroxysmal atrial fibrillation accelerated rates sometimes as high as 150 to 160 bpm noted.  Occasional PVCs are noted.  Impression:  Recent hospitalization for heart failure preserved ejection fraction secondary to mitral regurgitation (severe) and associated pulmonary hypertension. Improvement noted with IV furosemide though limitations included blood pressure drifting lower and hyponatremia. Importantly, patient is 89, largely wheelchair-bound, with metastatic prostate cancer and an indwelling Silva catheter. He has been poorly tolerant of anticoagulation in general due to his hematuria. Heart failure has been largely well-controlled until this recent hospitalization. The finding of diminished breath sounds at the left base seems a bit more pronounced than in the past possibly reflecting an effusion and a or loculation. 1.  RBBB, first-degree and LAFB, still no evidence of symptomatic conduction disease.  2.  Anticoagulation has been stopped again because of the risks caused by recurrent hematuria and frequent falls.    There has been another fall since last visit further cementing this decision.  Discussed with the family that this is a risk-benefit analysis and at this point in time, it seems like the appropriate decision. The change in mentation may be due to a TIA/CVA and the family understands that absent anticoagulation in the face of valvular heart disease with a large left atrium there is a significant risk of embolic events. Family understands that we cannot exclude the risk of an embolic event because of a-fib. Aspirin has not been used because of  recent problem with H. pylori and peptic ulcer disease.  Plavix has been tolerated so far.  3.  There is a history of severe mitral regurgitation clearly heard on examination, and this is probably the underlying substrate that contributes to the atrial fibrillation.   4.  The current echocardiogram continues to demonstrate 3-4+ eccentric mitral regurgitation with severe (nearly systemic) pulmonary hypertension and LVH and normal left ventricular systolic function.  Do not see any major changes on this echocardiogram but nonetheless the patient appears to be asymptomatic of heart failure  5.  Generalized failure to thrive is evident.  Blood pressure is quite low today 82 systolic but generally running 100 millimeters of mercury or higher.  There has been no loss of consciousness nor any near LOC. Spironolactone has been stopped and furosemide changed to every other day.  Plan: 1.  Furosemide 20 mg daily will be continued.     Plan is for home blood draw to check CBC and BMP in 1 week.      Emphasized the need to keep on top of the fluid status and symptoms so that we can act upon them as soon as absolutely possible.  2. Instructed wife to contact me if blood pressure stays under 100 or there is witnessed dizziness or loss of consciousness.  3.  Neuro evaluation was emphasized.  Patient has seen Dr. Rodo Berumen in the past.  4.  Meclizine 25 mg p.o. as needed for the vertigo.  5.  Continue Plavix 75 mg p.o. daily to least have some form of anticoagulation in the face of PAF and history of           CVA.  6.  Follow-up will be here in a few weeks   Contact me if there are any new symptoms of shortness of breath, orthopnea or edema.

## 2024-03-19 NOTE — REASON FOR VISIT
[FreeTextEntry1] : Patient presents here for  cardiac re- evaluation following a 5-day hospitalization at [facility] for heart failure symptoms.   Reportedly was short of breath with orthopnea for about 3 days before hospitalization. Treated with several days of IV Lasix with limitation being that interval development of hyponatremia. Chest x-ray report of left lower lobe infiltrate (?). A more aggressive evaluation with RONALD and cardiac catheterization was discussed but not performed.  Patient's major medical problem at this point in time is advanced prostate cancer for which he is now being treated with hormonal therapy and has an indwelling Silva.  He has had several bouts of hematuria and recent UTI's   Per the wife, patient is having problems with overall failure to thrive.       There has been no loss of consciousness.      Patient is clearly much less verbal about his overall health Intercurrent medical issues have regarded treatment of prostate cancer with Xtandi which resulted in memory loss weakness anorexia.  Patient subsequently has been unfocused and had poor mentation.  There has been anorexia and weight loss.   He has some mild baseline shortness of breath but this has been less concerning to him more recently.  In [facility] 6/12/2021 to 6/17/2021 with acute gangrenous cholecystitis.- emergency laparoscopic cholecystectomy. Cardiology consultation performed may have been with another group.  We were not informed.  His cardiac history is that of: Paroxysmal atrial fibrillation  Severe mitral valve insufficiency.  History of a CVA 2008.  Right-sided hemiparesis History of hypertension.  History of hyperlipidemia.  We also elected to begin clopidogrel for the atrial fibrillation.  Issues of falling and hematuria prevented conventional anticoagulation and the patient peptic ulcer disease preempted use of aspirin.  He did sustain a recent presumed mechanical fall per the family.  There was not much in the way of traumatic damage. 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 CVA in [place]  2008 and at that time was begun on Coumadin therapy which he had continued until bouts of hematuria led to the discontinuation of Coumadin.  Frequent falls created an additional anticoagulation concern.  He has baseline exertional fatigability but moves very slowly with the use of a walker.    There is no chest pain, palpitations, PND edema.    The falls all seem to be due to balance issues, not necessarily due to any loss of vision or consciousness.   He has been tolerating 25 mg of metoprolol daily without any significant dizziness.

## 2024-04-03 PROBLEM — R06.09 DYSPNEA ON EXERTION: Status: ACTIVE | Noted: 2020-12-07

## 2024-04-03 PROBLEM — I50.32 CHRONIC DIASTOLIC CONGESTIVE HEART FAILURE: Status: ACTIVE | Noted: 2024-01-01

## 2024-04-03 PROBLEM — I48.0 PAROXYSMAL ATRIAL FIBRILLATION: Status: ACTIVE | Noted: 2020-08-13

## 2024-04-03 PROBLEM — I34.0 MITRAL VALVE INSUFFICIENCY: Status: ACTIVE | Noted: 2020-08-13

## 2024-04-03 PROBLEM — I27.21 PULMONARY ARTERIAL HYPERTENSION: Status: ACTIVE | Noted: 2022-02-09

## 2024-04-03 NOTE — REASON FOR VISIT
[FreeTextEntry1] : Patient presents here for  cardiac re- evaluation.  Seen 3/19/24 following a 5-day hospitalization at  [facility] for heart failure symptoms.   Reportedly was short of breath with orthopnea for about 3 days before hospitalization. Treated with several days of IV Lasix with limitation being that interval development of hyponatremia. Chest x-ray report of left lower lobe infiltrate (?). A more aggressive evaluation with RONALD and cardiac catheterization was discussed but not performed.  Patient's major medical problem at this point in time is advanced prostate cancer for which he is now being treated with hormonal therapy and has an indwelling Silva.  He has had several bouts of hematuria and recent UTI's   Per the wife, patient is having problems with overall failure to thrive.   Eating poorly and having difficulty with BMs.     There has been no loss of consciousness.      Patient is clearly much less verbal about his overall health Intercurrent medical issues have regarded treatment of prostate cancer with Xtandi which resulted in memory loss weakness anorexia.  Patient subsequently has been unfocused and had poor mentation.  There has been anorexia and weight loss.   Per family there is increasing shortness of breath over the last few days.  In [facility] 6/12/2021 to 6/17/2021 with acute gangrenous cholecystitis.- emergency laparoscopic cholecystectomy. Cardiology consultation performed may have been with another group.  We were not informed.  His cardiac history is that of: Paroxysmal atrial fibrillation  Severe mitral valve insufficiency.  History of a CVA 2008.  Right-sided hemiparesis History of hypertension.  History of hyperlipidemia.  We also elected to begin clopidogrel for the atrial fibrillation.  Issues of falling and hematuria prevented conventional anticoagulation and the patient peptic ulcer disease preempted use of aspirin.   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 CVA in [place]  2008 and at that time was begun on Coumadin therapy which he had continued until bouts of hematuria led to the discontinuation of Coumadin.  Frequent falls created an additional anticoagulation concern.  He has baseline exertional fatigability but moves very slowly with the use of a walker.    There is no chest pain, palpitations, PND edema.    The falls all seem to be due to balance issues, not necessarily due to any loss of vision or consciousness.   He has been tolerating 25 mg of metoprolol daily without any significant dizziness.

## 2024-04-03 NOTE — PHYSICAL EXAM
[Eyelids - No Xanthelasma] : the eyelids demonstrated no xanthelasmas [Normal Conjunctiva] : the conjunctiva exhibited no abnormalities [Normal Oral Mucosa] : normal oral mucosa [No Oral Pallor] : no oral pallor [No Oral Cyanosis] : no oral cyanosis [Normal Jugular Venous A Waves Present] : normal jugular venous A waves present [Normal Jugular Venous V Waves Present] : normal jugular venous V waves present [No Jugular Venous Schneider A Waves] : no jugular venous schneider A waves [Abdomen Soft] : soft [Abdomen Tenderness] : non-tender [Abdomen Mass (___ Cm)] : no abdominal mass palpated [Skin Turgor] : normal skin turgor [] : no rash [No Anxiety] : not feeling anxious [Impaired Insight] : insight and judgment were intact [FreeTextEntry1] : Blunted affect [de-identified] : Diminished breath sounds approximately assisted up on the left with dullness rales and wheezes mid lung field on the left one quarter of the way up on the right

## 2024-04-03 NOTE — ASSESSMENT
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 LVH heart rate of 91 beats minute  Laboratory data  -----9/16/20-----1/4/2021--7/22/22 Chol---255----------159------160 HDL---- 47-----------48-------41 LDL----186---------- 94------101 Hemoglobin 11.2  6/5/2023 Na+ --131 Alb - 2.9 APhos- 153  Echocardiogram 10/13/2022: Borderline increase in wall thickness LV ejection action 65 to 70% Severe left atrial dilatation Moderate tricuspid regurgitation Severe mitral valve prolapse and regurgitation Severe pulmonary hypertension PA systolic 97 mmHg  Echocardiogram 6/14/2021 [facility] LVH with normal systolic function ejection fraction 60 to 65% Severe pulmonary hypertension 92 mmHg Severe left atrial enlargement Severe mitral regurgitation Moderately severe tricuspid regurgitation  Echocardiogram 12/10/2020: Overall normal left ventricular systolic function. Partially flail posterior leaflet mitral valve with severe eccentric mitral valve regurgitation. Moderate tricuspid regurgitation Severe pulmonary artery hypertension  Event recorder 8/27/2022 9/9/2020 Sinus rhythm with runs of paroxysmal atrial fibrillation accelerated rates sometimes as high as 150 to 160 bpm noted.  Occasional PVCs are noted.  Impression:  Recent hospitalization for heart failure preserved ejection fraction secondary to mitral regurgitation (severe) and associated pulmonary hypertension. Improvement noted with IV furosemide though limitations included low blood pressure and hyponatremia. Importantly, patient is 89, largely wheelchair-bound, with metastatic prostate cancer and an indwelling Silva catheter. He has been poorly tolerant of anticoagulation due to his hematuria. Heart failure had been largely well-controlled until this recent hospitalization. The finding of diminished breath sounds now with rales on the left base is more pronounced than in the past possibly reflecting an effusion and a or loculation. 1.  Heart failure worsening by clinical symptoms and examination at this time.  2.  Anticoagulation has been stopped again because of the risks caused by recurrent hematuria and frequent falls.    There has been another fall since last visit further cementing this decision.  Discussed with the family that this is a risk-benefit analysis and at this point in time, it seems like the appropriate decision. The change in mentation may be due to a TIA/CVA and the family understands that absent anticoagulation in the face of valvular heart disease with a large left atrium there is a significant risk of embolic events. Family understands that we cannot exclude the risk of an embolic event because of a-fib. Aspirin has not been used because of  recent problem with H. pylori and peptic ulcer disease.  Plavix has been tolerated so far.  3.  Severe mitral regurgitation clearly heard on examination, and this is probably the underlying substrate that contributes to the atrial fibrillation.   4.  The current echocardiogram continues to demonstrate 3-4+ eccentric mitral regurgitation with severe (nearly systemic) pulmonary hypertension and LVH and normal left ventricular systolic function.    5.  Generalized failure to thrive is evident.  Blood pressure is quite low today 82 systolic but generally running 100 millimeters of mercury or higher.  There has been no loss of consciousness, nor any near LOC. Spironolactone has been stopped and furosemide changed to every other day.  6. AF with Conduction disease: RBBB, first-degree and LAFB, still no evidence of symptomatic conduction disease.  Plan: 1.  IV Lasix 60 mg will be given at this visit.       Furosemide 20 mg daily will be continued. Plan is for home blood draw to check CBC and BMP in next week. Emphasized the need to keep on top of the fluid status and symptoms so that we can act upon them as soon as absolutely possible.  2. Instructed wife to contact me if blood pressure stays under 100 or there is witnessed dizziness or loss of consciousness.  3.  Neuro evaluation was emphasized.  Patient has seen Dr. Rodo Berumen in the past.  4.  Meclizine 25 mg p.o. as needed for the vertigo.  5.  Continue Plavix 75 mg p.o. daily to least have some form of anticoagulation in the face of PAF and history of           CVA.  6.  Follow-up will be here in a week  Contact me if there are any new symptoms of shortness of breath, orthopnea or edema.

## 2024-04-09 ENCOUNTER — APPOINTMENT (OUTPATIENT)
Dept: CARDIOLOGY | Facility: CLINIC | Age: 89
End: 2024-04-09

## 2024-04-09 ENCOUNTER — NON-APPOINTMENT (OUTPATIENT)
Age: 89
End: 2024-04-09

## 2024-04-18 ENCOUNTER — APPOINTMENT (OUTPATIENT)
Dept: UROLOGY | Facility: CLINIC | Age: 89
End: 2024-04-18

## 2024-07-20 NOTE — PROGRESS NOTE ADULT - REASON FOR ADMISSION
Enterococcus bacteremia, UTI
Yes
